# Patient Record
Sex: MALE | Race: WHITE | NOT HISPANIC OR LATINO | Employment: OTHER | URBAN - METROPOLITAN AREA
[De-identification: names, ages, dates, MRNs, and addresses within clinical notes are randomized per-mention and may not be internally consistent; named-entity substitution may affect disease eponyms.]

---

## 2017-01-12 RX ORDER — GLIPIZIDE 5 MG/1
TABLET ORAL
Qty: 180 TABLET | Refills: 0 | Status: SHIPPED | OUTPATIENT
Start: 2017-01-12 | End: 2017-03-30 | Stop reason: SDUPTHER

## 2017-01-13 ENCOUNTER — APPOINTMENT (OUTPATIENT)
Dept: CT IMAGING | Facility: HOSPITAL | Age: 77
End: 2017-01-13

## 2017-01-13 ENCOUNTER — APPOINTMENT (OUTPATIENT)
Dept: GENERAL RADIOLOGY | Facility: HOSPITAL | Age: 77
End: 2017-01-13

## 2017-01-23 RX ORDER — CYCLOBENZAPRINE HCL 5 MG
TABLET ORAL
Qty: 60 TABLET | Refills: 0 | Status: SHIPPED | OUTPATIENT
Start: 2017-01-23 | End: 2017-04-10 | Stop reason: HOSPADM

## 2017-02-22 ENCOUNTER — HOSPITAL ENCOUNTER (OUTPATIENT)
Dept: ULTRASOUND IMAGING | Facility: HOSPITAL | Age: 77
Discharge: HOME | End: 2017-02-22
Attending: FAMILY MEDICINE | Admitting: FAMILY MEDICINE

## 2017-02-22 DIAGNOSIS — E04.1 THYROID NODULE: ICD-10-CM

## 2017-02-22 PROCEDURE — 76536 US EXAM OF HEAD AND NECK: CPT

## 2017-02-23 ENCOUNTER — HOSPITAL ENCOUNTER (OUTPATIENT)
Dept: GENERAL RADIOLOGY | Facility: HOSPITAL | Age: 77
Discharge: HOME | End: 2017-02-23
Admitting: PAIN MEDICINE

## 2017-02-23 DIAGNOSIS — M51.37 DDD (DEGENERATIVE DISC DISEASE), LUMBOSACRAL: ICD-10-CM

## 2017-02-23 PROCEDURE — 73502 X-RAY EXAM HIP UNI 2-3 VIEWS: CPT

## 2017-03-08 RX ORDER — LEVOTHYROXINE SODIUM 0.05 MG/1
TABLET ORAL
Qty: 90 TABLET | Refills: 1 | Status: SHIPPED | OUTPATIENT
Start: 2017-03-08 | End: 2017-03-30

## 2017-03-30 ENCOUNTER — OFFICE VISIT (OUTPATIENT)
Dept: FAMILY MEDICINE CLINIC | Facility: CLINIC | Age: 77
End: 2017-03-30

## 2017-03-30 VITALS
SYSTOLIC BLOOD PRESSURE: 134 MMHG | TEMPERATURE: 97.8 F | HEART RATE: 73 BPM | DIASTOLIC BLOOD PRESSURE: 90 MMHG | BODY MASS INDEX: 27.09 KG/M2 | WEIGHT: 204.4 LBS | OXYGEN SATURATION: 97 % | HEIGHT: 73 IN

## 2017-03-30 DIAGNOSIS — E04.2 MULTIPLE THYROID NODULES: ICD-10-CM

## 2017-03-30 DIAGNOSIS — M54.16 LUMBAR RADICULOPATHY: Primary | ICD-10-CM

## 2017-03-30 PROCEDURE — 99213 OFFICE O/P EST LOW 20 MIN: CPT | Performed by: FAMILY MEDICINE

## 2017-03-30 RX ORDER — LANCETS
EACH MISCELLANEOUS
Qty: 100 EACH | Refills: 12 | Status: SHIPPED | OUTPATIENT
Start: 2017-03-30

## 2017-03-30 RX ORDER — ATORVASTATIN CALCIUM 80 MG/1
80 TABLET, FILM COATED ORAL DAILY
Qty: 90 TABLET | Refills: 1 | Status: SHIPPED | OUTPATIENT
Start: 2017-03-30 | End: 2017-12-04 | Stop reason: SDUPTHER

## 2017-03-30 RX ORDER — LEVOTHYROXINE SODIUM 0.07 MG/1
75 TABLET ORAL DAILY
Qty: 90 TABLET | Refills: 1 | Status: SHIPPED | OUTPATIENT
Start: 2017-03-30 | End: 2017-12-15 | Stop reason: DRUGHIGH

## 2017-03-30 RX ORDER — GLIPIZIDE 5 MG/1
5 TABLET ORAL
Qty: 180 TABLET | Refills: 1 | Status: SHIPPED | OUTPATIENT
Start: 2017-03-30 | End: 2017-10-31 | Stop reason: SDUPTHER

## 2017-03-30 NOTE — PROGRESS NOTES
"  Chief Complaint   Patient presents with   • Hip Pain     pt is been with left hip pain for over 6m ,he has chronic lower back pain but since his getting physical therapy ,has left hip pain and goes all the way down to left leg,he thinks pain manadgment ,with epidural injections my help       Subjective.../HPI  Patient present today with pain in left hip and leg.     I have reviewed the patient's medical history in detail and updated the computerized patient record.    Family History   Problem Relation Age of Onset   • Breast cancer Mother    • Cancer Mother    • Brain cancer Sister    • Other Brother      CABG   • Stroke Brother    • Heart disease Brother        Social History     Social History   • Marital status:      Spouse name: N/A   • Number of children: N/A   • Years of education: N/A     Occupational History   • Not on file.     Social History Main Topics   • Smoking status: Former Smoker     Packs/day: 1.00     Years: 20.00   • Smokeless tobacco: Not on file   • Alcohol use No      Comment: RARE 3-4 YR   • Drug use: Not on file   • Sexual activity: Not on file     Other Topics Concern   • Not on file     Social History Narrative       Review of Systems:   Review of Systems   Constitutional: Negative.    HENT: Positive for hearing loss.    Eyes: Negative.    Respiratory: Positive for apnea.    Cardiovascular: Negative.    Gastrointestinal: Positive for constipation.   Endocrine: Negative.    Musculoskeletal: Positive for arthralgias, back pain, gait problem and joint swelling.   Skin: Negative.    Neurological: Positive for headaches.   Hematological: Negative.    Psychiatric/Behavioral: Positive for confusion.       Objective:  Vital Signs     Vitals:    03/30/17 1213   BP: 134/90   BP Location: Right arm   Patient Position: Sitting   Pulse: 73   Temp: 97.8 °F (36.6 °C)   TempSrc: Oral   SpO2: 97%   Weight: 204 lb 6.4 oz (92.7 kg)   Height: 73\" (185.4 cm)     Physical Exam   Constitutional: He is " oriented to person, place, and time. He appears well-developed and well-nourished.   HENT:   Head: Normocephalic.   Eyes: Pupils are equal, round, and reactive to light.   Neck: Normal range of motion.   Cardiovascular: Normal rate and regular rhythm.    Pulmonary/Chest: Effort normal.   Abdominal: Soft.   Musculoskeletal: Normal range of motion.   Neurological: He is alert and oriented to person, place, and time.   Left Lumbar radiculopathy with pain radiating down left leg to hip and lateral thigh   Skin: Skin is warm and dry.        Results Review:      REVIEWED AND DISCUSSED XRAY RESULTS WITH PATIENT and REVIEWED AND DISCUSSED CLINICAL RESULTS WITH PATIENT                          Current Outpatient Prescriptions:   •  aspirin 81 MG tablet, Take 81 mg by mouth Daily., Disp: , Rfl:   •  atorvastatin (LIPITOR) 80 MG tablet, Take 1 tablet by mouth Daily., Disp: 90 tablet, Rfl: 1  •  cyclobenzaprine (FLEXERIL) 5 MG tablet, TAKE 1 TABLET EVERY EVENING, Disp: 60 tablet, Rfl: 0  •  Ferrous Sulfate 27 MG tablet, Take  by mouth Every Night., Disp: , Rfl:   •  glipiZIDE (GLUCOTROL) 5 MG tablet, Take 1 tablet by mouth 2 (Two) Times a Day Before Meals., Disp: 180 tablet, Rfl: 1  •  levothyroxine (SYNTHROID) 75 MCG tablet, Take 1 tablet by mouth Daily., Disp: 90 tablet, Rfl: 1  •  meclizine (ANTIVERT) 25 MG tablet, Take 1 tablet by mouth At Night As Needed for dizziness., Disp: 30 tablet, Rfl: 2  •  naproxen sodium (ALEVE) 220 MG tablet, Take 220 mg by mouth 2 (Two) Times a Day As Needed for mild pain (1-3)., Disp: , Rfl:   •  ONE TOUCH ULTRA TEST test strip, 1 each by Other route 2 (Two) Times a Day., Disp: 100 each, Rfl: 5  •  Lancets (ONETOUCH ULTRASOFT) lancets, Daily fasting glucose, Disp: 100 each, Rfl: 12    Procedures    Assessment/Plan     Diagnoses and all orders for this visit:    Lumbar radiculopathy    Multiple thyroid nodules  -     US Thyroid    Other orders  -     atorvastatin (LIPITOR) 80 MG tablet; Take 1  tablet by mouth Daily.  -     levothyroxine (SYNTHROID) 75 MCG tablet; Take 1 tablet by mouth Daily.  -     glipiZIDE (GLUCOTROL) 5 MG tablet; Take 1 tablet by mouth 2 (Two) Times a Day Before Meals.  -     ONE TOUCH ULTRA TEST test strip; 1 each by Other route 2 (Two) Times a Day.  -     Lancets (ONETOUCH ULTRASOFT) lancets; Daily fasting glucose         Has seen miguel miranda and the studies she ordered were not done and did not follow up with dr win. Will schedule the 3 xrays and then get appt to dr win.      Pt latter states that he canceled the tests that miguel miranda scheduled due to the fact that he was afraid when he read that he had to lay down for 24 hours and to go to er if developed a headache. He is ok with doing the tests now.  Luis Darnell Jr., MD  03/30/17  2:00 PM

## 2017-04-05 ENCOUNTER — APPOINTMENT (OUTPATIENT)
Dept: ULTRASOUND IMAGING | Facility: HOSPITAL | Age: 77
End: 2017-04-05
Attending: FAMILY MEDICINE

## 2017-04-10 ENCOUNTER — OFFICE VISIT (OUTPATIENT)
Dept: NEUROSURGERY | Facility: CLINIC | Age: 77
End: 2017-04-10

## 2017-04-10 VITALS
SYSTOLIC BLOOD PRESSURE: 112 MMHG | DIASTOLIC BLOOD PRESSURE: 62 MMHG | WEIGHT: 204 LBS | HEIGHT: 73 IN | BODY MASS INDEX: 27.04 KG/M2 | HEART RATE: 73 BPM

## 2017-04-10 DIAGNOSIS — M54.16 LUMBAR RADICULOPATHY: Primary | ICD-10-CM

## 2017-04-10 DIAGNOSIS — M51.36 DDD (DEGENERATIVE DISC DISEASE), LUMBAR: ICD-10-CM

## 2017-04-10 PROCEDURE — 99213 OFFICE O/P EST LOW 20 MIN: CPT | Performed by: PHYSICIAN ASSISTANT

## 2017-04-10 NOTE — PROGRESS NOTES
Subjective   Patient ID: Anthony Hernandez is a 77 y.o. male is here today for follow-up for back and left leg pain.  He uses heat which does help with the pain. He has not had recent imaging. Mr Hernandez is not taking any pain medications at this time.      Back Pain   This is a recurrent problem. The current episode started more than 1 year ago. The problem occurs intermittently. The problem has been gradually worsening since onset. The pain is present in the lumbar spine. The quality of the pain is described as aching (leg cramp). The pain radiates to the left thigh and left knee. The pain is at a severity of 2/10. The pain is mild. Associated symptoms include leg pain. Pertinent negatives include no bladder incontinence, bowel incontinence, fever or weakness. He has tried nothing for the symptoms.   Leg Pain    There was no injury mechanism. The pain is present in the left leg. The quality of the pain is described as shooting and stabbing. The pain is moderate. The pain has been intermittent since onset.       The following portions of the patient's history were reviewed and updated as appropriate: allergies, current medications, past family history, past medical history, past social history, past surgical history and problem list.    Review of Systems   Constitutional: Negative for fever.   Gastrointestinal: Negative for bowel incontinence.   Genitourinary: Negative for bladder incontinence and difficulty urinating.   Musculoskeletal: Positive for back pain (left leg pain ). Negative for gait problem.   Neurological: Negative for weakness.   All other systems reviewed and are negative.      Objective   Physical Exam   Constitutional: He is oriented to person, place, and time. He appears well-developed and well-nourished.   HENT:   Head: Normocephalic and atraumatic.   Right Ear: External ear normal.   Left Ear: External ear normal.   Eyes: Conjunctivae and EOM are normal. Pupils are equal, round, and reactive to light.  Right eye exhibits no discharge. Left eye exhibits no discharge.   Neck: Normal range of motion. Neck supple. No tracheal deviation present.   Cardiovascular: Normal rate and regular rhythm.    Pulmonary/Chest: Effort normal and breath sounds normal. No stridor. No respiratory distress.   Abdominal: Soft. Bowel sounds are normal.   Musculoskeletal: Normal range of motion. He exhibits no edema, tenderness or deformity.   Neurological: He is alert and oriented to person, place, and time. He has normal strength and normal reflexes. He displays no atrophy, no tremor and normal reflexes. No cranial nerve deficit or sensory deficit. He exhibits normal muscle tone. He displays a negative Romberg sign. He displays no seizure activity. Coordination and gait normal.   No long tract signs   Skin: Skin is warm and dry.   Psychiatric: He has a normal mood and affect. His behavior is normal. Judgment and thought content normal.   Nursing note and vitals reviewed.    Neurologic Exam     Mental Status   Oriented to person, place, and time.     Cranial Nerves     CN III, IV, VI   Pupils are equal, round, and reactive to light.  Extraocular motions are normal.     Motor Exam     Strength   Strength 5/5 throughout.       Assessment/Plan   Independent Review of Radiographic Studies:      Medical Decision Making:    Mr. Hernandez decided not to get the myelogram after his last appointment.  He now returns with chronic low back pain and left lateral leg pain.  The symptoms have not changed at all.  The pain is constant and severe and described as a burning type pain.  The leg pain is most limiting.  It worsens with activity and improves only very minimally when laying down.  It has not improved with therapy or epidurals or radiofrequency ablation.  He has not seen Dr. Chacko in a while but at his last appointment they had discussed the possibility of a spinal cord stimulator.  He would like to determine if there are any surgical options prior  to following up on a spinal cord stimulator trial.  He now feels ready to move forward with the myelogram.  I again discussed the procedure with the patient and his wife and he is aware of the risks of bleeding, infection and headache and does wish to proceed.  He will follow-up thereafter.  Anthony was seen today for back pain and leg pain.    Diagnoses and all orders for this visit:    Lumbar radiculopathy  -     CT lumbar spine without contrast; Future  -     IR MYELOGRAM LUMBAR SPINE; Future  -     XR Spine Lumbar Complete With Flex & Ext; Future    DDD (degenerative disc disease), lumbar  -     CT lumbar spine without contrast; Future  -     IR MYELOGRAM LUMBAR SPINE; Future  -     XR Spine Lumbar Complete With Flex & Ext; Future    Return for follow up after radiology test with Dr. Contreras.

## 2017-05-12 ENCOUNTER — HOSPITAL ENCOUNTER (OUTPATIENT)
Dept: GENERAL RADIOLOGY | Facility: HOSPITAL | Age: 77
Discharge: HOME | End: 2017-05-12

## 2017-05-12 ENCOUNTER — HOSPITAL ENCOUNTER (OUTPATIENT)
Dept: CT IMAGING | Facility: HOSPITAL | Age: 77
Discharge: HOME | End: 2017-05-12

## 2017-05-12 ENCOUNTER — HOSPITAL ENCOUNTER (OUTPATIENT)
Dept: GENERAL RADIOLOGY | Facility: HOSPITAL | Age: 77
Discharge: HOME | End: 2017-05-12
Admitting: PHYSICIAN ASSISTANT

## 2017-05-12 VITALS
WEIGHT: 199 LBS | TEMPERATURE: 97.2 F | DIASTOLIC BLOOD PRESSURE: 76 MMHG | HEART RATE: 72 BPM | HEIGHT: 73 IN | RESPIRATION RATE: 18 BRPM | BODY MASS INDEX: 26.37 KG/M2 | OXYGEN SATURATION: 95 % | SYSTOLIC BLOOD PRESSURE: 168 MMHG

## 2017-05-12 DIAGNOSIS — M51.36 DDD (DEGENERATIVE DISC DISEASE), LUMBAR: ICD-10-CM

## 2017-05-12 DIAGNOSIS — M54.16 LUMBAR RADICULOPATHY: ICD-10-CM

## 2017-05-12 PROCEDURE — 72265 MYELOGRAPHY L-S SPINE: CPT

## 2017-05-12 PROCEDURE — 72114 X-RAY EXAM L-S SPINE BENDING: CPT

## 2017-05-12 PROCEDURE — 0 IOPAMIDOL 41 % SOLUTION: Performed by: RADIOLOGY

## 2017-05-12 PROCEDURE — 72131 CT LUMBAR SPINE W/O DYE: CPT

## 2017-05-12 RX ORDER — LIDOCAINE HYDROCHLORIDE 10 MG/ML
10 INJECTION, SOLUTION INFILTRATION; PERINEURAL ONCE
Status: COMPLETED | OUTPATIENT
Start: 2017-05-12 | End: 2017-05-12

## 2017-05-12 RX ADMIN — LIDOCAINE HYDROCHLORIDE 2 ML: 10 INJECTION, SOLUTION INFILTRATION; PERINEURAL at 09:15

## 2017-05-12 RX ADMIN — IOPAMIDOL 20 ML: 408 INJECTION, SOLUTION INTRATHECAL at 09:17

## 2017-05-16 DIAGNOSIS — Z86.39 HISTORY OF THYROID NODULE: Primary | ICD-10-CM

## 2017-05-22 RX ORDER — CYCLOBENZAPRINE HCL 5 MG
TABLET ORAL
Qty: 60 TABLET | Refills: 0 | Status: SHIPPED | OUTPATIENT
Start: 2017-05-22 | End: 2017-06-19 | Stop reason: ALTCHOICE

## 2017-05-30 ENCOUNTER — OFFICE VISIT (OUTPATIENT)
Dept: NEUROSURGERY | Facility: CLINIC | Age: 77
End: 2017-05-30

## 2017-05-30 VITALS
BODY MASS INDEX: 26.11 KG/M2 | DIASTOLIC BLOOD PRESSURE: 80 MMHG | WEIGHT: 197 LBS | SYSTOLIC BLOOD PRESSURE: 152 MMHG | HEART RATE: 69 BPM | HEIGHT: 73 IN

## 2017-05-30 DIAGNOSIS — M48.061 LUMBAR CANAL STENOSIS: Primary | ICD-10-CM

## 2017-05-30 DIAGNOSIS — M51.36 DDD (DEGENERATIVE DISC DISEASE), LUMBAR: ICD-10-CM

## 2017-05-30 PROCEDURE — 99214 OFFICE O/P EST MOD 30 MIN: CPT | Performed by: NEUROLOGICAL SURGERY

## 2017-06-05 ENCOUNTER — TRANSCRIBE ORDERS (OUTPATIENT)
Dept: NEUROSURGERY | Facility: CLINIC | Age: 77
End: 2017-06-05

## 2017-06-05 DIAGNOSIS — M48.061 LUMBAR SPINAL STENOSIS: Primary | ICD-10-CM

## 2017-06-05 DIAGNOSIS — M51.36 LUMBAR DEGENERATIVE DISC DISEASE: ICD-10-CM

## 2017-06-07 ENCOUNTER — HOSPITAL ENCOUNTER (OUTPATIENT)
Dept: ULTRASOUND IMAGING | Facility: HOSPITAL | Age: 77
Discharge: HOME | End: 2017-06-07
Attending: FAMILY MEDICINE | Admitting: FAMILY MEDICINE

## 2017-06-07 DIAGNOSIS — Z86.39 HISTORY OF THYROID NODULE: ICD-10-CM

## 2017-06-07 PROCEDURE — 76536 US EXAM OF HEAD AND NECK: CPT

## 2017-06-09 DIAGNOSIS — E04.1 THYROID NODULE: Primary | ICD-10-CM

## 2017-06-12 ENCOUNTER — TELEPHONE (OUTPATIENT)
Dept: NEUROSURGERY | Facility: CLINIC | Age: 77
End: 2017-06-12

## 2017-06-12 NOTE — TELEPHONE ENCOUNTER
He is scheduled for surgery 6/27 with Dr. Contreras. Is it ok for him to try some PT until he has surgery?

## 2017-06-13 NOTE — TELEPHONE ENCOUNTER
Spoke with Mrs. Hernandez she said he will not take the chances of causing more pain so he will not do PT.

## 2017-06-13 NOTE — TELEPHONE ENCOUNTER
He has fairly severe stenosis. I don't think it will help and it may exacerbate his pain but if he insists on doing it I am ok with giving him an order. Please just document that we warned him that it may make his pain worse.

## 2017-06-19 ENCOUNTER — HOSPITAL ENCOUNTER (OUTPATIENT)
Dept: GENERAL RADIOLOGY | Facility: HOSPITAL | Age: 77
Discharge: HOME | End: 2017-06-19
Admitting: NEUROLOGICAL SURGERY

## 2017-06-19 ENCOUNTER — APPOINTMENT (OUTPATIENT)
Dept: PREADMISSION TESTING | Facility: HOSPITAL | Age: 77
End: 2017-06-19

## 2017-06-19 VITALS
DIASTOLIC BLOOD PRESSURE: 81 MMHG | BODY MASS INDEX: 25.98 KG/M2 | SYSTOLIC BLOOD PRESSURE: 143 MMHG | HEIGHT: 73 IN | WEIGHT: 196 LBS | HEART RATE: 78 BPM | TEMPERATURE: 97.3 F | RESPIRATION RATE: 16 BRPM | OXYGEN SATURATION: 100 %

## 2017-06-19 LAB
ANION GAP SERPL CALCULATED.3IONS-SCNC: 14.1 MMOL/L
APTT PPP: 26.5 SECONDS (ref 22.7–35.4)
BACTERIA UR QL AUTO: NORMAL /HPF
BASOPHILS # BLD AUTO: 0.08 10*3/MM3 (ref 0–0.2)
BASOPHILS NFR BLD AUTO: 1 % (ref 0–1.5)
BILIRUB UR QL STRIP: NEGATIVE
BUN BLD-MCNC: 17 MG/DL (ref 8–23)
BUN/CREAT SERPL: 12.6 (ref 7–25)
CALCIUM SPEC-SCNC: 9.1 MG/DL (ref 8.6–10.5)
CHLORIDE SERPL-SCNC: 100 MMOL/L (ref 98–107)
CLARITY UR: CLEAR
CO2 SERPL-SCNC: 24.9 MMOL/L (ref 22–29)
COLOR UR: YELLOW
CREAT BLD-MCNC: 1.35 MG/DL (ref 0.76–1.27)
DEPRECATED RDW RBC AUTO: 41.5 FL (ref 37–54)
EOSINOPHIL # BLD AUTO: 0.08 10*3/MM3 (ref 0–0.7)
EOSINOPHIL NFR BLD AUTO: 1 % (ref 0.3–6.2)
ERYTHROCYTE [DISTWIDTH] IN BLOOD BY AUTOMATED COUNT: 12.9 % (ref 11.5–14.5)
GFR SERPL CREATININE-BSD FRML MDRD: 51 ML/MIN/1.73
GLUCOSE BLD-MCNC: 179 MG/DL (ref 65–99)
GLUCOSE UR STRIP-MCNC: ABNORMAL MG/DL
HCT VFR BLD AUTO: 38.6 % (ref 40.4–52.2)
HGB BLD-MCNC: 13.2 G/DL (ref 13.7–17.6)
HGB UR QL STRIP.AUTO: NEGATIVE
HYALINE CASTS UR QL AUTO: NORMAL /LPF
IMM GRANULOCYTES # BLD: 0.02 10*3/MM3 (ref 0–0.03)
IMM GRANULOCYTES NFR BLD: 0.2 % (ref 0–0.5)
INR PPP: 1.14 (ref 0.9–1.1)
KETONES UR QL STRIP: NEGATIVE
LEUKOCYTE ESTERASE UR QL STRIP.AUTO: NEGATIVE
LYMPHOCYTES # BLD AUTO: 2.97 10*3/MM3 (ref 0.9–4.8)
LYMPHOCYTES NFR BLD AUTO: 36.4 % (ref 19.6–45.3)
MCH RBC QN AUTO: 30.2 PG (ref 27–32.7)
MCHC RBC AUTO-ENTMCNC: 34.2 G/DL (ref 32.6–36.4)
MCV RBC AUTO: 88.3 FL (ref 79.8–96.2)
MONOCYTES # BLD AUTO: 0.46 10*3/MM3 (ref 0.2–1.2)
MONOCYTES NFR BLD AUTO: 5.6 % (ref 5–12)
NEUTROPHILS # BLD AUTO: 4.54 10*3/MM3 (ref 1.9–8.1)
NEUTROPHILS NFR BLD AUTO: 55.8 % (ref 42.7–76)
NITRITE UR QL STRIP: NEGATIVE
PH UR STRIP.AUTO: 5.5 [PH] (ref 5–8)
PLATELET # BLD AUTO: 193 10*3/MM3 (ref 140–500)
PMV BLD AUTO: 9.8 FL (ref 6–12)
POTASSIUM BLD-SCNC: 4.2 MMOL/L (ref 3.5–5.2)
PROT UR QL STRIP: NEGATIVE
PROTHROMBIN TIME: 14.1 SECONDS (ref 11.7–14.2)
RBC # BLD AUTO: 4.37 10*6/MM3 (ref 4.6–6)
RBC # UR: NORMAL /HPF
REF LAB TEST METHOD: NORMAL
SODIUM BLD-SCNC: 139 MMOL/L (ref 136–145)
SP GR UR STRIP: <=1.005 (ref 1–1.03)
SQUAMOUS #/AREA URNS HPF: NORMAL /HPF
UROBILINOGEN UR QL STRIP: ABNORMAL
WBC NRBC COR # BLD: 8.15 10*3/MM3 (ref 4.5–10.7)
WBC UR QL AUTO: NORMAL /HPF

## 2017-06-19 PROCEDURE — 81001 URINALYSIS AUTO W/SCOPE: CPT | Performed by: NEUROLOGICAL SURGERY

## 2017-06-19 PROCEDURE — 85025 COMPLETE CBC W/AUTO DIFF WBC: CPT | Performed by: NEUROLOGICAL SURGERY

## 2017-06-19 PROCEDURE — 93005 ELECTROCARDIOGRAM TRACING: CPT

## 2017-06-19 PROCEDURE — 80048 BASIC METABOLIC PNL TOTAL CA: CPT | Performed by: NEUROLOGICAL SURGERY

## 2017-06-19 PROCEDURE — 71020 HC CHEST PA AND LATERAL: CPT

## 2017-06-19 PROCEDURE — 85730 THROMBOPLASTIN TIME PARTIAL: CPT | Performed by: NEUROLOGICAL SURGERY

## 2017-06-19 PROCEDURE — 36415 COLL VENOUS BLD VENIPUNCTURE: CPT

## 2017-06-19 PROCEDURE — 93010 ELECTROCARDIOGRAM REPORT: CPT | Performed by: INTERNAL MEDICINE

## 2017-06-19 PROCEDURE — 85610 PROTHROMBIN TIME: CPT | Performed by: NEUROLOGICAL SURGERY

## 2017-06-19 RX ORDER — CYCLOBENZAPRINE HCL 5 MG
5 TABLET ORAL 2 TIMES DAILY PRN
COMMUNITY
End: 2017-07-06 | Stop reason: SDUPTHER

## 2017-06-19 NOTE — DISCHARGE INSTRUCTIONS
NO medications the morning of surgery :        General Instructions:  • Do not eat solid food after midnight the night before surgery.  • You may drink clear liquids day of surgery but must stop at least one hour before your hospital arrival time.  • It is beneficial for you to have a clear drink that contains carbohydrates the day of surgery.  We suggest a 20 ounce bottle of Gatorade or Powerade for non-diabetic patients or a 20 ounce bottle of G2 or Powerade Zero for diabetic patients. (Pediatric patients, are not advised to drink a 20 ounce carbohydrate drink)    Clear liquids are liquids you can see through.  Nothing red in color.     Plain water                               Sports drinks  Sodas                                   Gelatin (Jell-O)  Fruit juices without pulp such as white grape juice and apple juice  Popsicles that contain no fruit or yogurt  Tea or coffee (no cream or milk added)  Gatorade / Powerade  G2 / Powerade Zero  • Patients who avoid smoking, chewing tobacco and alcohol for 4 weeks prior to surgery have a reduced risk of post-operative complications.  Quit smoking as many days before surgery as you can.  • Do not smoke, use chewing tobacco or drink alcohol the day of surgery.   • If applicable bring your C-PAP/ BI-PAP machine.  • Bring any papers given to you in the doctor’s office.  • Wear clean comfortable clothes and socks.  • Do not wear contact lenses or make-up.  Bring a case for your glasses.   • Bring crutches or walker if applicable.  • Leave all other valuables and jewelry at home.  • The Pre-Admission Testing nurse will instruct you to bring medications if unable to obtain an accurate list in Pre-Admission Testing.        Preventing a Surgical Site Infection:  • For 2 to 3 days before surgery, avoid shaving with a razor because the razor can irritate skin and make it easier to develop an infection.  • The night prior to surgery sleep in a clean bed with clean clothing.  Do not  allow pets to sleep with you.  • Shower on the morning of surgery using a fresh bar of anti-bacterial soap (such as Dial) and clean washcloth.  Dry with a clean towel and dress in clean clothing.  • Ask your surgeon if you will be receiving antibiotics prior to surgery.  • Make sure you, your family, and all healthcare providers clean their hands with soap and water or an alcohol based hand  before caring for you or your wound.    Day of surgery: 06- YOU WILL BE CALLED WITH AN ARRIVAL TIME FOR SURGERY. REPORT TO THE MAIN @ THAT TIME.  Upon arrival, a Pre-op nurse and Anesthesiologist will review your health history, obtain vital signs, and answer questions you may have.  The only belongings needed at this time will be your home medications and if applicable your C-PAP/BI-PAP machine.  If you are staying overnight your family can leave the rest of your belongings in the car and bring them to your room later.  A Pre-op nurse will start an IV and you may receive medication in preparation for surgery, including something to help you relax.  Your family will be able to see you in the Pre-op area.  While you are in surgery your family should notify the waiting room  if they leave the waiting room area and provide a contact phone number.    Please be aware that surgery does come with discomfort.  We want to make every effort to control your discomfort so please discuss any uncontrolled symptoms with your nurse.   Your doctor will most likely have prescribed pain medications.      If you are going home after surgery you will receive individualized written care instructions before being discharged.  A responsible adult must drive you to and from the hospital on the day of your surgery and stay with you for 24 hours.    If you are staying overnight following surgery, you will be transported to your hospital room following the recovery period.   has all private rooms.    If  you have any questions please call Pre-Admission Testing at [phone].  Deductibles and co-payments are collected on the day of service. Please be prepared to pay the required co-pay, deductible or deposit on the day of service as defined by your plan.

## 2017-06-27 ENCOUNTER — ANESTHESIA EVENT (OUTPATIENT)
Dept: PERIOP | Facility: HOSPITAL | Age: 77
End: 2017-06-27

## 2017-06-27 ENCOUNTER — ANESTHESIA (OUTPATIENT)
Dept: PERIOP | Facility: HOSPITAL | Age: 77
End: 2017-06-27

## 2017-06-27 ENCOUNTER — APPOINTMENT (OUTPATIENT)
Dept: GENERAL RADIOLOGY | Facility: HOSPITAL | Age: 77
End: 2017-06-27

## 2017-06-27 ENCOUNTER — HOSPITAL ENCOUNTER (INPATIENT)
Facility: HOSPITAL | Age: 77
LOS: 3 days | Discharge: NURSING, REHAB OR LONG-TERM CARE | End: 2017-07-01
Attending: NEUROLOGICAL SURGERY | Admitting: NEUROLOGICAL SURGERY

## 2017-06-27 DIAGNOSIS — R26.2 DIFFICULTY WALKING: Primary | ICD-10-CM

## 2017-06-27 PROBLEM — M48.061 STENOSIS, SPINAL, LUMBAR: Status: ACTIVE | Noted: 2017-06-27

## 2017-06-27 LAB
GLUCOSE BLDC GLUCOMTR-MCNC: 169 MG/DL (ref 70–130)
GLUCOSE BLDC GLUCOMTR-MCNC: 178 MG/DL (ref 70–130)

## 2017-06-27 PROCEDURE — 25010000002 MIDAZOLAM PER 1 MG: Performed by: ANESTHESIOLOGY

## 2017-06-27 PROCEDURE — 25010000002 PROPOFOL 10 MG/ML EMULSION: Performed by: NURSE ANESTHETIST, CERTIFIED REGISTERED

## 2017-06-27 PROCEDURE — 25010000003 CEFAZOLIN IN DEXTROSE 2-4 GM/100ML-% SOLUTION: Performed by: NEUROLOGICAL SURGERY

## 2017-06-27 PROCEDURE — A9270 NON-COVERED ITEM OR SERVICE: HCPCS | Performed by: NEUROLOGICAL SURGERY

## 2017-06-27 PROCEDURE — 25010000002 MIDAZOLAM PER 1 MG

## 2017-06-27 PROCEDURE — 63710000001 MASTISOL LIQUID: Performed by: NEUROLOGICAL SURGERY

## 2017-06-27 PROCEDURE — 63048 LAM FACETEC &FORAMOT EA ADDL: CPT | Performed by: NEUROLOGICAL SURGERY

## 2017-06-27 PROCEDURE — 82962 GLUCOSE BLOOD TEST: CPT

## 2017-06-27 PROCEDURE — 25010000002 MORPHINE PER 10 MG

## 2017-06-27 PROCEDURE — 63710000001 GLIPIZIDE 5 MG TABLET: Performed by: NEUROLOGICAL SURGERY

## 2017-06-27 PROCEDURE — 25010000002 PHENYLEPHRINE PER 1 ML: Performed by: NURSE ANESTHETIST, CERTIFIED REGISTERED

## 2017-06-27 PROCEDURE — 25010000002 FENTANYL CITRATE (PF) 100 MCG/2ML SOLUTION: Performed by: NURSE ANESTHETIST, CERTIFIED REGISTERED

## 2017-06-27 PROCEDURE — 63710000001 ATORVASTATIN 80 MG TABLET: Performed by: NEUROLOGICAL SURGERY

## 2017-06-27 PROCEDURE — 25010000002 DEXAMETHASONE PER 1 MG: Performed by: NURSE ANESTHETIST, CERTIFIED REGISTERED

## 2017-06-27 PROCEDURE — 76000 FLUOROSCOPY <1 HR PHYS/QHP: CPT

## 2017-06-27 PROCEDURE — 25010000002 ONDANSETRON PER 1 MG: Performed by: NURSE ANESTHETIST, CERTIFIED REGISTERED

## 2017-06-27 PROCEDURE — 01NB0ZZ RELEASE LUMBAR NERVE, OPEN APPROACH: ICD-10-PCS | Performed by: NEUROLOGICAL SURGERY

## 2017-06-27 PROCEDURE — 0SB20ZZ EXCISION OF LUMBAR VERTEBRAL DISC, OPEN APPROACH: ICD-10-PCS | Performed by: NEUROLOGICAL SURGERY

## 2017-06-27 PROCEDURE — 25010000002 NEOSTIGMINE PER 0.5 MG: Performed by: NURSE ANESTHETIST, CERTIFIED REGISTERED

## 2017-06-27 PROCEDURE — 63710000001 HYDROCODONE-ACETAMINOPHEN 7.5-325 MG TABLET: Performed by: NEUROLOGICAL SURGERY

## 2017-06-27 PROCEDURE — 25010000002 METHOCARBAMOL 1000 MG/10ML SOLUTION 10 ML VIAL: Performed by: NEUROLOGICAL SURGERY

## 2017-06-27 PROCEDURE — 25010000002 FENTANYL CITRATE (PF) 100 MCG/2ML SOLUTION

## 2017-06-27 PROCEDURE — 63047 LAM FACETEC & FORAMOT LUMBAR: CPT | Performed by: NEUROLOGICAL SURGERY

## 2017-06-27 PROCEDURE — 72020 X-RAY EXAM OF SPINE 1 VIEW: CPT

## 2017-06-27 PROCEDURE — 25010000003 CEFAZOLIN PER 500 MG: Performed by: NEUROLOGICAL SURGERY

## 2017-06-27 RX ORDER — GLIPIZIDE 5 MG/1
5 TABLET ORAL
Status: DISCONTINUED | OUTPATIENT
Start: 2017-06-27 | End: 2017-07-01 | Stop reason: HOSPADM

## 2017-06-27 RX ORDER — MORPHINE SULFATE IN 0.9 % NACL 50 MG/50ML
PATIENT CONTROLLED ANALGESIA SYRINGE INTRAVENOUS
Status: COMPLETED
Start: 2017-06-27 | End: 2017-06-27

## 2017-06-27 RX ORDER — HYDRALAZINE HYDROCHLORIDE 20 MG/ML
5 INJECTION INTRAMUSCULAR; INTRAVENOUS
Status: DISCONTINUED | OUTPATIENT
Start: 2017-06-27 | End: 2017-06-27 | Stop reason: HOSPADM

## 2017-06-27 RX ORDER — MIDAZOLAM HYDROCHLORIDE 1 MG/ML
INJECTION INTRAMUSCULAR; INTRAVENOUS
Status: COMPLETED
Start: 2017-06-27 | End: 2017-06-27

## 2017-06-27 RX ORDER — FENTANYL CITRATE 50 UG/ML
INJECTION, SOLUTION INTRAMUSCULAR; INTRAVENOUS
Status: COMPLETED
Start: 2017-06-27 | End: 2017-06-27

## 2017-06-27 RX ORDER — DEXTROSE, SODIUM CHLORIDE, AND POTASSIUM CHLORIDE 5; .45; .15 G/100ML; G/100ML; G/100ML
75 INJECTION INTRAVENOUS CONTINUOUS
Status: DISCONTINUED | OUTPATIENT
Start: 2017-06-27 | End: 2017-07-01 | Stop reason: HOSPADM

## 2017-06-27 RX ORDER — FENTANYL CITRATE 50 UG/ML
50 INJECTION, SOLUTION INTRAMUSCULAR; INTRAVENOUS
Status: DISCONTINUED | OUTPATIENT
Start: 2017-06-27 | End: 2017-06-27 | Stop reason: HOSPADM

## 2017-06-27 RX ORDER — SODIUM CHLORIDE 0.9 % (FLUSH) 0.9 %
1-10 SYRINGE (ML) INJECTION AS NEEDED
Status: DISCONTINUED | OUTPATIENT
Start: 2017-06-27 | End: 2017-06-27 | Stop reason: HOSPADM

## 2017-06-27 RX ORDER — SODIUM CHLORIDE, SODIUM LACTATE, POTASSIUM CHLORIDE, CALCIUM CHLORIDE 600; 310; 30; 20 MG/100ML; MG/100ML; MG/100ML; MG/100ML
9 INJECTION, SOLUTION INTRAVENOUS CONTINUOUS
Status: DISCONTINUED | OUTPATIENT
Start: 2017-06-27 | End: 2017-06-27

## 2017-06-27 RX ORDER — SODIUM CHLORIDE, SODIUM LACTATE, POTASSIUM CHLORIDE, CALCIUM CHLORIDE 600; 310; 30; 20 MG/100ML; MG/100ML; MG/100ML; MG/100ML
INJECTION, SOLUTION INTRAVENOUS CONTINUOUS PRN
Status: DISCONTINUED | OUTPATIENT
Start: 2017-06-27 | End: 2017-06-27 | Stop reason: SURG

## 2017-06-27 RX ORDER — HYDROMORPHONE HYDROCHLORIDE 1 MG/ML
0.25 INJECTION, SOLUTION INTRAMUSCULAR; INTRAVENOUS; SUBCUTANEOUS
Status: DISCONTINUED | OUTPATIENT
Start: 2017-06-27 | End: 2017-06-27 | Stop reason: HOSPADM

## 2017-06-27 RX ORDER — GLYCOPYRROLATE 0.2 MG/ML
INJECTION INTRAMUSCULAR; INTRAVENOUS AS NEEDED
Status: DISCONTINUED | OUTPATIENT
Start: 2017-06-27 | End: 2017-06-27 | Stop reason: SURG

## 2017-06-27 RX ORDER — ATORVASTATIN CALCIUM 80 MG/1
80 TABLET, FILM COATED ORAL DAILY
Status: DISCONTINUED | OUTPATIENT
Start: 2017-06-27 | End: 2017-07-01 | Stop reason: HOSPADM

## 2017-06-27 RX ORDER — DEXAMETHASONE SODIUM PHOSPHATE 10 MG/ML
INJECTION INTRAMUSCULAR; INTRAVENOUS AS NEEDED
Status: DISCONTINUED | OUTPATIENT
Start: 2017-06-27 | End: 2017-06-27 | Stop reason: SURG

## 2017-06-27 RX ORDER — BUPIVACAINE HYDROCHLORIDE AND EPINEPHRINE 2.5; 5 MG/ML; UG/ML
INJECTION, SOLUTION INFILTRATION; PERINEURAL AS NEEDED
Status: DISCONTINUED | OUTPATIENT
Start: 2017-06-27 | End: 2017-06-27 | Stop reason: HOSPADM

## 2017-06-27 RX ORDER — ONDANSETRON 2 MG/ML
4 INJECTION INTRAMUSCULAR; INTRAVENOUS EVERY 6 HOURS PRN
Status: DISCONTINUED | OUTPATIENT
Start: 2017-06-27 | End: 2017-07-01 | Stop reason: HOSPADM

## 2017-06-27 RX ORDER — ONDANSETRON 2 MG/ML
4 INJECTION INTRAMUSCULAR; INTRAVENOUS ONCE AS NEEDED
Status: DISCONTINUED | OUTPATIENT
Start: 2017-06-27 | End: 2017-06-27 | Stop reason: HOSPADM

## 2017-06-27 RX ORDER — ENALAPRILAT 2.5 MG/2ML
1.25 INJECTION INTRAVENOUS ONCE AS NEEDED
Status: DISCONTINUED | OUTPATIENT
Start: 2017-06-27 | End: 2017-06-27 | Stop reason: HOSPADM

## 2017-06-27 RX ORDER — CYCLOBENZAPRINE HCL 10 MG
5 TABLET ORAL 3 TIMES DAILY PRN
Status: DISCONTINUED | OUTPATIENT
Start: 2017-06-27 | End: 2017-07-01 | Stop reason: HOSPADM

## 2017-06-27 RX ORDER — ROCURONIUM BROMIDE 10 MG/ML
INJECTION, SOLUTION INTRAVENOUS AS NEEDED
Status: DISCONTINUED | OUTPATIENT
Start: 2017-06-27 | End: 2017-06-27 | Stop reason: SURG

## 2017-06-27 RX ORDER — FAMOTIDINE 10 MG/ML
20 INJECTION, SOLUTION INTRAVENOUS ONCE
Status: COMPLETED | OUTPATIENT
Start: 2017-06-27 | End: 2017-06-27

## 2017-06-27 RX ORDER — SODIUM CHLORIDE 0.9 % (FLUSH) 0.9 %
1-10 SYRINGE (ML) INJECTION AS NEEDED
Status: DISCONTINUED | OUTPATIENT
Start: 2017-06-27 | End: 2017-07-01 | Stop reason: HOSPADM

## 2017-06-27 RX ORDER — NALOXONE HCL 0.4 MG/ML
0.1 VIAL (ML) INJECTION
Status: DISCONTINUED | OUTPATIENT
Start: 2017-06-27 | End: 2017-06-27 | Stop reason: HOSPADM

## 2017-06-27 RX ORDER — LIDOCAINE HYDROCHLORIDE 40 MG/ML
SOLUTION TOPICAL AS NEEDED
Status: DISCONTINUED | OUTPATIENT
Start: 2017-06-27 | End: 2017-06-27 | Stop reason: SURG

## 2017-06-27 RX ORDER — LABETALOL HYDROCHLORIDE 5 MG/ML
5 INJECTION, SOLUTION INTRAVENOUS
Status: DISCONTINUED | OUTPATIENT
Start: 2017-06-27 | End: 2017-06-27 | Stop reason: HOSPADM

## 2017-06-27 RX ORDER — HEPARIN SODIUM 10000 [USP'U]/ML
INJECTION, SOLUTION INTRAVENOUS; SUBCUTANEOUS AS NEEDED
Status: DISCONTINUED | OUTPATIENT
Start: 2017-06-27 | End: 2017-06-27 | Stop reason: HOSPADM

## 2017-06-27 RX ORDER — SODIUM CHLORIDE 9 MG/ML
INJECTION, SOLUTION INTRAVENOUS CONTINUOUS PRN
Status: DISCONTINUED | OUTPATIENT
Start: 2017-06-27 | End: 2017-06-27 | Stop reason: HOSPADM

## 2017-06-27 RX ORDER — SENNA AND DOCUSATE SODIUM 50; 8.6 MG/1; MG/1
1 TABLET, FILM COATED ORAL NIGHTLY PRN
Status: DISCONTINUED | OUTPATIENT
Start: 2017-06-27 | End: 2017-07-01 | Stop reason: HOSPADM

## 2017-06-27 RX ORDER — CEFAZOLIN SODIUM 2 G/100ML
2 INJECTION, SOLUTION INTRAVENOUS ONCE
Status: COMPLETED | OUTPATIENT
Start: 2017-06-27 | End: 2017-06-27

## 2017-06-27 RX ORDER — ACETAMINOPHEN 325 MG/1
650 TABLET ORAL EVERY 4 HOURS PRN
Status: DISCONTINUED | OUTPATIENT
Start: 2017-06-27 | End: 2017-07-01 | Stop reason: HOSPADM

## 2017-06-27 RX ORDER — MORPHINE SULFATE IN 0.9 % NACL 50 MG/50ML
PATIENT CONTROLLED ANALGESIA SYRINGE INTRAVENOUS CONTINUOUS
Status: DISCONTINUED | OUTPATIENT
Start: 2017-06-27 | End: 2017-06-28

## 2017-06-27 RX ORDER — DOCUSATE SODIUM 100 MG/1
100 CAPSULE, LIQUID FILLED ORAL 2 TIMES DAILY PRN
Status: DISCONTINUED | OUTPATIENT
Start: 2017-06-27 | End: 2017-07-01 | Stop reason: HOSPADM

## 2017-06-27 RX ORDER — FENTANYL CITRATE 50 UG/ML
INJECTION, SOLUTION INTRAMUSCULAR; INTRAVENOUS AS NEEDED
Status: DISCONTINUED | OUTPATIENT
Start: 2017-06-27 | End: 2017-06-27 | Stop reason: SURG

## 2017-06-27 RX ORDER — WOUND DRESSING ADHESIVE - LIQUID
LIQUID MISCELLANEOUS AS NEEDED
Status: DISCONTINUED | OUTPATIENT
Start: 2017-06-27 | End: 2017-06-27 | Stop reason: HOSPADM

## 2017-06-27 RX ORDER — LIDOCAINE HYDROCHLORIDE 20 MG/ML
INJECTION, SOLUTION INFILTRATION; PERINEURAL AS NEEDED
Status: DISCONTINUED | OUTPATIENT
Start: 2017-06-27 | End: 2017-06-27 | Stop reason: SURG

## 2017-06-27 RX ORDER — ONDANSETRON 2 MG/ML
INJECTION INTRAMUSCULAR; INTRAVENOUS AS NEEDED
Status: DISCONTINUED | OUTPATIENT
Start: 2017-06-27 | End: 2017-06-27 | Stop reason: SURG

## 2017-06-27 RX ORDER — MIDAZOLAM HYDROCHLORIDE 1 MG/ML
1 INJECTION INTRAMUSCULAR; INTRAVENOUS
Status: DISCONTINUED | OUTPATIENT
Start: 2017-06-27 | End: 2017-06-27 | Stop reason: HOSPADM

## 2017-06-27 RX ORDER — HYDROCODONE BITARTRATE AND ACETAMINOPHEN 7.5; 325 MG/1; MG/1
1 TABLET ORAL EVERY 4 HOURS PRN
Status: DISCONTINUED | OUTPATIENT
Start: 2017-06-27 | End: 2017-07-01 | Stop reason: HOSPADM

## 2017-06-27 RX ORDER — FAMOTIDINE 10 MG/ML
INJECTION, SOLUTION INTRAVENOUS
Status: COMPLETED
Start: 2017-06-27 | End: 2017-06-27

## 2017-06-27 RX ORDER — MIDAZOLAM HYDROCHLORIDE 1 MG/ML
2 INJECTION INTRAMUSCULAR; INTRAVENOUS
Status: DISCONTINUED | OUTPATIENT
Start: 2017-06-27 | End: 2017-06-27 | Stop reason: HOSPADM

## 2017-06-27 RX ORDER — LEVOTHYROXINE SODIUM 0.07 MG/1
75 TABLET ORAL
Status: DISCONTINUED | OUTPATIENT
Start: 2017-06-28 | End: 2017-07-01 | Stop reason: HOSPADM

## 2017-06-27 RX ORDER — PROPOFOL 10 MG/ML
VIAL (ML) INTRAVENOUS AS NEEDED
Status: DISCONTINUED | OUTPATIENT
Start: 2017-06-27 | End: 2017-06-27 | Stop reason: SURG

## 2017-06-27 RX ADMIN — PHENYLEPHRINE HYDROCHLORIDE 100 MCG: 10 INJECTION INTRAVENOUS at 18:11

## 2017-06-27 RX ADMIN — FENTANYL CITRATE 50 MCG: 50 INJECTION INTRAMUSCULAR; INTRAVENOUS at 16:58

## 2017-06-27 RX ADMIN — FAMOTIDINE 20 MG: 10 INJECTION, SOLUTION INTRAVENOUS at 15:09

## 2017-06-27 RX ADMIN — FENTANYL CITRATE 50 MCG: 50 INJECTION INTRAMUSCULAR; INTRAVENOUS at 19:29

## 2017-06-27 RX ADMIN — SODIUM CHLORIDE, POTASSIUM CHLORIDE, SODIUM LACTATE AND CALCIUM CHLORIDE: 600; 310; 30; 20 INJECTION, SOLUTION INTRAVENOUS at 16:28

## 2017-06-27 RX ADMIN — PROPOFOL 130 MG: 10 INJECTION, EMULSION INTRAVENOUS at 16:58

## 2017-06-27 RX ADMIN — NEOSTIGMINE METHYLSULFATE 3 MG: 1 INJECTION INTRAMUSCULAR; INTRAVENOUS; SUBCUTANEOUS at 18:21

## 2017-06-27 RX ADMIN — SODIUM CHLORIDE, POTASSIUM CHLORIDE, SODIUM LACTATE AND CALCIUM CHLORIDE 9 ML/HR: 600; 310; 30; 20 INJECTION, SOLUTION INTRAVENOUS at 15:10

## 2017-06-27 RX ADMIN — Medication: at 19:04

## 2017-06-27 RX ADMIN — GLIPIZIDE 5 MG: 5 TABLET ORAL at 23:52

## 2017-06-27 RX ADMIN — FENTANYL CITRATE 50 MCG: 50 INJECTION INTRAMUSCULAR; INTRAVENOUS at 19:40

## 2017-06-27 RX ADMIN — MIDAZOLAM 1 MG: 1 INJECTION INTRAMUSCULAR; INTRAVENOUS at 15:53

## 2017-06-27 RX ADMIN — FAMOTIDINE 20 MG: 10 INJECTION INTRAVENOUS at 15:09

## 2017-06-27 RX ADMIN — CEFAZOLIN SODIUM 2 G: 2 INJECTION, SOLUTION INTRAVENOUS at 16:56

## 2017-06-27 RX ADMIN — LIDOCAINE HYDROCHLORIDE 100 MG: 20 INJECTION, SOLUTION INFILTRATION; PERINEURAL at 16:58

## 2017-06-27 RX ADMIN — GLYCOPYRROLATE 0.4 MG: 0.2 INJECTION INTRAMUSCULAR; INTRAVENOUS at 18:21

## 2017-06-27 RX ADMIN — POTASSIUM CHLORIDE, DEXTROSE MONOHYDRATE AND SODIUM CHLORIDE 75 ML/HR: 150; 5; 450 INJECTION, SOLUTION INTRAVENOUS at 22:19

## 2017-06-27 RX ADMIN — HYDROCODONE BITARTRATE AND ACETAMINOPHEN 1 TABLET: 7.5; 325 TABLET ORAL at 22:19

## 2017-06-27 RX ADMIN — PHENYLEPHRINE HYDROCHLORIDE 100 MCG: 10 INJECTION INTRAVENOUS at 17:40

## 2017-06-27 RX ADMIN — PHENYLEPHRINE HYDROCHLORIDE 100 MCG: 10 INJECTION INTRAVENOUS at 17:52

## 2017-06-27 RX ADMIN — ROCURONIUM BROMIDE 40 MG: 10 INJECTION INTRAVENOUS at 16:58

## 2017-06-27 RX ADMIN — PHENYLEPHRINE HYDROCHLORIDE 100 MCG: 10 INJECTION INTRAVENOUS at 17:28

## 2017-06-27 RX ADMIN — METHOCARBAMOL 1000 MG: 1000 INJECTION, SOLUTION INTRAMUSCULAR; INTRAVENOUS at 19:42

## 2017-06-27 RX ADMIN — MIDAZOLAM 1 MG: 1 INJECTION INTRAMUSCULAR; INTRAVENOUS at 15:09

## 2017-06-27 RX ADMIN — DEXAMETHASONE SODIUM PHOSPHATE 8 MG: 10 INJECTION INTRAMUSCULAR; INTRAVENOUS at 17:26

## 2017-06-27 RX ADMIN — LIDOCAINE HYDROCHLORIDE 1 EACH: 40 SPRAY LARYNGEAL; TRANSTRACHEAL at 17:01

## 2017-06-27 RX ADMIN — ONDANSETRON 4 MG: 2 INJECTION INTRAMUSCULAR; INTRAVENOUS at 18:18

## 2017-06-27 RX ADMIN — FENTANYL CITRATE 50 MCG: 50 INJECTION INTRAMUSCULAR; INTRAVENOUS at 17:22

## 2017-06-27 RX ADMIN — SODIUM CHLORIDE, POTASSIUM CHLORIDE, SODIUM LACTATE AND CALCIUM CHLORIDE: 600; 310; 30; 20 INJECTION, SOLUTION INTRAVENOUS at 17:52

## 2017-06-27 RX ADMIN — ATORVASTATIN CALCIUM 80 MG: 80 TABLET, FILM COATED ORAL at 23:52

## 2017-06-27 RX ADMIN — FENTANYL CITRATE 50 MCG: 50 INJECTION, SOLUTION INTRAMUSCULAR; INTRAVENOUS at 19:29

## 2017-06-27 NOTE — ANESTHESIA PROCEDURE NOTES
Airway  Urgency: elective    Date/Time: 6/27/2017 5:01 PM  Airway not difficult    General Information and Staff    Patient location during procedure: OR  Anesthesiologist: MARÍA ABBOTT  CRNA: MINDI THORPE    Indications and Patient Condition  Indications for airway management: airway protection    Preoxygenated: yes  MILS not maintained throughout  Mask difficulty assessment: 1 - vent by mask    Final Airway Details  Final airway type: endotracheal airway      Successful airway: ETT and reinforced tube  Cuffed: yes   Successful intubation technique: direct laryngoscopy  Endotracheal tube insertion site: oral  Blade: [place]  Blade size: #3  ETT size: 7.0 mm  Cormack-Lehane Classification: grade I - full view of glottis  Placement verified by: chest auscultation   Measured from: lips  ETT to lips (cm): 21  Number of attempts at approach: 1    Additional Comments  Pre O2, SIAI

## 2017-06-27 NOTE — ANESTHESIA PREPROCEDURE EVALUATION
Anesthesia Evaluation     Patient summary reviewed   NPO Solid Status: > 6 hours  NPO Liquid Status: > 6 hours     Airway   Mallampati: II  TM distance: >3 FB  Dental      Pulmonary    (+) sleep apnea,   Cardiovascular     Rhythm: regular  Rate: normal    (+) valvular problems/murmurs, past MI  >12 months, CAD, CABG > 6 Months, PVD, hyperlipidemia      Neuro/Psych  GI/Hepatic/Renal/Endo    (+)  diabetes mellitus type 2,     Musculoskeletal     Abdominal    Substance History      OB/GYN          Other   (+) arthritis   history of cancer                                    Anesthesia Plan    ASA 3     general     Anesthetic plan and risks discussed with patient.

## 2017-06-28 ENCOUNTER — APPOINTMENT (OUTPATIENT)
Dept: CT IMAGING | Facility: HOSPITAL | Age: 77
End: 2017-06-28

## 2017-06-28 PROBLEM — R26.2 DIFFICULTY WALKING: Status: ACTIVE | Noted: 2017-06-28

## 2017-06-28 LAB
BASOPHILS # BLD AUTO: 0.02 10*3/MM3 (ref 0–0.2)
BASOPHILS NFR BLD AUTO: 0.2 % (ref 0–1.5)
BILIRUB UR QL STRIP: NEGATIVE
CLARITY UR: CLEAR
COLOR UR: YELLOW
DEPRECATED RDW RBC AUTO: 42.4 FL (ref 37–54)
EOSINOPHIL # BLD AUTO: 0.01 10*3/MM3 (ref 0–0.7)
EOSINOPHIL NFR BLD AUTO: 0.1 % (ref 0.3–6.2)
ERYTHROCYTE [DISTWIDTH] IN BLOOD BY AUTOMATED COUNT: 13.1 % (ref 11.5–14.5)
GLUCOSE BLDC GLUCOMTR-MCNC: 122 MG/DL (ref 70–130)
GLUCOSE BLDC GLUCOMTR-MCNC: 187 MG/DL (ref 70–130)
GLUCOSE BLDC GLUCOMTR-MCNC: 256 MG/DL (ref 70–130)
GLUCOSE BLDC GLUCOMTR-MCNC: 285 MG/DL (ref 70–130)
GLUCOSE UR STRIP-MCNC: ABNORMAL MG/DL
HCT VFR BLD AUTO: 34.8 % (ref 40.4–52.2)
HGB BLD-MCNC: 12.1 G/DL (ref 13.7–17.6)
HGB UR QL STRIP.AUTO: NEGATIVE
IMM GRANULOCYTES # BLD: 0.03 10*3/MM3 (ref 0–0.03)
IMM GRANULOCYTES NFR BLD: 0.3 % (ref 0–0.5)
KETONES UR QL STRIP: ABNORMAL
LEUKOCYTE ESTERASE UR QL STRIP.AUTO: NEGATIVE
LYMPHOCYTES # BLD AUTO: 0.62 10*3/MM3 (ref 0.9–4.8)
LYMPHOCYTES NFR BLD AUTO: 5.4 % (ref 19.6–45.3)
MCH RBC QN AUTO: 31.2 PG (ref 27–32.7)
MCHC RBC AUTO-ENTMCNC: 34.8 G/DL (ref 32.6–36.4)
MCV RBC AUTO: 89.7 FL (ref 79.8–96.2)
MONOCYTES # BLD AUTO: 0.75 10*3/MM3 (ref 0.2–1.2)
MONOCYTES NFR BLD AUTO: 6.5 % (ref 5–12)
NEUTROPHILS # BLD AUTO: 10.12 10*3/MM3 (ref 1.9–8.1)
NEUTROPHILS NFR BLD AUTO: 87.5 % (ref 42.7–76)
NITRITE UR QL STRIP: NEGATIVE
PH UR STRIP.AUTO: <=5 [PH] (ref 5–8)
PLATELET # BLD AUTO: 155 10*3/MM3 (ref 140–500)
PMV BLD AUTO: 10.5 FL (ref 6–12)
PROT UR QL STRIP: NEGATIVE
RBC # BLD AUTO: 3.88 10*6/MM3 (ref 4.6–6)
SP GR UR STRIP: >=1.03 (ref 1–1.03)
UROBILINOGEN UR QL STRIP: ABNORMAL
WBC NRBC COR # BLD: 11.55 10*3/MM3 (ref 4.5–10.7)

## 2017-06-28 PROCEDURE — 94799 UNLISTED PULMONARY SVC/PX: CPT

## 2017-06-28 PROCEDURE — 82962 GLUCOSE BLOOD TEST: CPT

## 2017-06-28 PROCEDURE — 99024 POSTOP FOLLOW-UP VISIT: CPT | Performed by: PHYSICIAN ASSISTANT

## 2017-06-28 PROCEDURE — 70450 CT HEAD/BRAIN W/O DYE: CPT

## 2017-06-28 PROCEDURE — G8978 MOBILITY CURRENT STATUS: HCPCS | Performed by: PHYSICAL THERAPIST

## 2017-06-28 PROCEDURE — 81003 URINALYSIS AUTO W/O SCOPE: CPT | Performed by: PHYSICIAN ASSISTANT

## 2017-06-28 PROCEDURE — 97110 THERAPEUTIC EXERCISES: CPT | Performed by: PHYSICAL THERAPIST

## 2017-06-28 PROCEDURE — G8979 MOBILITY GOAL STATUS: HCPCS | Performed by: PHYSICAL THERAPIST

## 2017-06-28 PROCEDURE — 25010000002 DIAZEPAM PER 5 MG: Performed by: PHYSICIAN ASSISTANT

## 2017-06-28 PROCEDURE — 85025 COMPLETE CBC W/AUTO DIFF WBC: CPT | Performed by: NEUROLOGICAL SURGERY

## 2017-06-28 PROCEDURE — 97162 PT EVAL MOD COMPLEX 30 MIN: CPT | Performed by: PHYSICAL THERAPIST

## 2017-06-28 RX ORDER — DIAZEPAM 5 MG/ML
10 INJECTION, SOLUTION INTRAMUSCULAR; INTRAVENOUS ONCE
Status: COMPLETED | OUTPATIENT
Start: 2017-06-28 | End: 2017-06-28

## 2017-06-28 RX ADMIN — GLIPIZIDE 5 MG: 5 TABLET ORAL at 17:58

## 2017-06-28 RX ADMIN — POTASSIUM CHLORIDE, DEXTROSE MONOHYDRATE AND SODIUM CHLORIDE 75 ML/HR: 150; 5; 450 INJECTION, SOLUTION INTRAVENOUS at 12:55

## 2017-06-28 RX ADMIN — DIAZEPAM 5 MG: 5 INJECTION, SOLUTION INTRAMUSCULAR; INTRAVENOUS at 04:41

## 2017-06-28 NOTE — ANESTHESIA POSTPROCEDURE EVALUATION
Patient: Anthony Hernandez    Procedure Summary     Date Anesthesia Start Anesthesia Stop Room / Location    06/27/17 [id number]  [facility] /  [facility]       Procedure Diagnosis Surgeon Provider    L3/4, L4/5 OPEN LUMBAR DECOMPRESSION POSTERIOR 1-2 LEVELS (Bilateral Spine Lumbar) Lumbar spinal stenosis; Lumbar degenerative disc disease  (Lumbar spinal stenosis [M48.06]; Lumbar degenerative disc disease [M51.36]) MD Matthew Cortes MD          Anesthesia Type: general  Last vitals  /86 (06/27/17 2030)    Temp      Pulse 81 (06/27/17 2015)   Resp 20 (06/27/17 2015)    SpO2 100 % (06/27/17 2015)      Post Anesthesia Care and Evaluation    Patient location during evaluation: PACU  Anesthetic complications: No anesthetic complications

## 2017-06-29 ENCOUNTER — APPOINTMENT (OUTPATIENT)
Dept: GENERAL RADIOLOGY | Facility: HOSPITAL | Age: 77
End: 2017-06-29
Attending: FAMILY MEDICINE

## 2017-06-29 LAB
ANION GAP SERPL CALCULATED.3IONS-SCNC: 15.1 MMOL/L
BASOPHILS # BLD AUTO: 0.05 10*3/MM3 (ref 0–0.2)
BASOPHILS NFR BLD AUTO: 0.4 % (ref 0–1.5)
BUN BLD-MCNC: 14 MG/DL (ref 8–23)
BUN/CREAT SERPL: 13.5 (ref 7–25)
CALCIUM SPEC-SCNC: 8.8 MG/DL (ref 8.6–10.5)
CHLORIDE SERPL-SCNC: 99 MMOL/L (ref 98–107)
CO2 SERPL-SCNC: 23.9 MMOL/L (ref 22–29)
CREAT BLD-MCNC: 1.04 MG/DL (ref 0.76–1.27)
DEPRECATED RDW RBC AUTO: 43.7 FL (ref 37–54)
EOSINOPHIL # BLD AUTO: 0.05 10*3/MM3 (ref 0–0.7)
EOSINOPHIL NFR BLD AUTO: 0.4 % (ref 0.3–6.2)
ERYTHROCYTE [DISTWIDTH] IN BLOOD BY AUTOMATED COUNT: 13.2 % (ref 11.5–14.5)
GFR SERPL CREATININE-BSD FRML MDRD: 69 ML/MIN/1.73
GLUCOSE BLD-MCNC: 200 MG/DL (ref 65–99)
HCT VFR BLD AUTO: 35.1 % (ref 40.4–52.2)
HGB BLD-MCNC: 11.7 G/DL (ref 13.7–17.6)
IMM GRANULOCYTES # BLD: 0.04 10*3/MM3 (ref 0–0.03)
IMM GRANULOCYTES NFR BLD: 0.3 % (ref 0–0.5)
LYMPHOCYTES # BLD AUTO: 1.97 10*3/MM3 (ref 0.9–4.8)
LYMPHOCYTES NFR BLD AUTO: 15.3 % (ref 19.6–45.3)
MCH RBC QN AUTO: 30.6 PG (ref 27–32.7)
MCHC RBC AUTO-ENTMCNC: 33.3 G/DL (ref 32.6–36.4)
MCV RBC AUTO: 91.9 FL (ref 79.8–96.2)
MONOCYTES # BLD AUTO: 1.3 10*3/MM3 (ref 0.2–1.2)
MONOCYTES NFR BLD AUTO: 10.1 % (ref 5–12)
NEUTROPHILS # BLD AUTO: 9.46 10*3/MM3 (ref 1.9–8.1)
NEUTROPHILS NFR BLD AUTO: 73.5 % (ref 42.7–76)
PLATELET # BLD AUTO: 141 10*3/MM3 (ref 140–500)
PMV BLD AUTO: 10.6 FL (ref 6–12)
POTASSIUM BLD-SCNC: 4.2 MMOL/L (ref 3.5–5.2)
RBC # BLD AUTO: 3.82 10*6/MM3 (ref 4.6–6)
SODIUM BLD-SCNC: 138 MMOL/L (ref 136–145)
WBC NRBC COR # BLD: 12.87 10*3/MM3 (ref 4.5–10.7)

## 2017-06-29 PROCEDURE — 97110 THERAPEUTIC EXERCISES: CPT

## 2017-06-29 PROCEDURE — 85025 COMPLETE CBC W/AUTO DIFF WBC: CPT | Performed by: PHYSICIAN ASSISTANT

## 2017-06-29 PROCEDURE — 99233 SBSQ HOSP IP/OBS HIGH 50: CPT | Performed by: FAMILY MEDICINE

## 2017-06-29 PROCEDURE — 99024 POSTOP FOLLOW-UP VISIT: CPT | Performed by: NURSE PRACTITIONER

## 2017-06-29 PROCEDURE — 87040 BLOOD CULTURE FOR BACTERIA: CPT | Performed by: FAMILY MEDICINE

## 2017-06-29 PROCEDURE — 71020 HC CHEST PA AND LATERAL: CPT

## 2017-06-29 PROCEDURE — 82962 GLUCOSE BLOOD TEST: CPT

## 2017-06-29 PROCEDURE — 80048 BASIC METABOLIC PNL TOTAL CA: CPT | Performed by: PHYSICIAN ASSISTANT

## 2017-06-29 PROCEDURE — 94799 UNLISTED PULMONARY SVC/PX: CPT

## 2017-06-29 RX ADMIN — ATORVASTATIN CALCIUM 80 MG: 80 TABLET, FILM COATED ORAL at 08:12

## 2017-06-29 RX ADMIN — LEVOTHYROXINE SODIUM 75 MCG: 75 TABLET ORAL at 05:32

## 2017-06-29 RX ADMIN — GLIPIZIDE 5 MG: 5 TABLET ORAL at 08:12

## 2017-06-29 RX ADMIN — GLIPIZIDE 5 MG: 5 TABLET ORAL at 17:08

## 2017-06-29 RX ADMIN — ACETAMINOPHEN 650 MG: 325 TABLET ORAL at 03:14

## 2017-06-29 RX ADMIN — POTASSIUM CHLORIDE, DEXTROSE MONOHYDRATE AND SODIUM CHLORIDE 75 ML/HR: 150; 5; 450 INJECTION, SOLUTION INTRAVENOUS at 13:31

## 2017-06-29 RX ADMIN — POTASSIUM CHLORIDE, DEXTROSE MONOHYDRATE AND SODIUM CHLORIDE 75 ML/HR: 150; 5; 450 INJECTION, SOLUTION INTRAVENOUS at 03:15

## 2017-06-30 LAB
ALBUMIN SERPL-MCNC: 3.9 G/DL (ref 3.5–5.2)
ALBUMIN/GLOB SERPL: 1.3 G/DL
ALP SERPL-CCNC: 97 U/L (ref 39–117)
ALT SERPL W P-5'-P-CCNC: 19 U/L (ref 1–41)
ANION GAP SERPL CALCULATED.3IONS-SCNC: 15.2 MMOL/L
AST SERPL-CCNC: 28 U/L (ref 1–40)
BASOPHILS # BLD AUTO: 0.06 10*3/MM3 (ref 0–0.2)
BASOPHILS NFR BLD AUTO: 0.5 % (ref 0–1.5)
BILIRUB SERPL-MCNC: 0.9 MG/DL (ref 0.1–1.2)
BILIRUB UR QL STRIP: NEGATIVE
BUN BLD-MCNC: 12 MG/DL (ref 8–23)
BUN/CREAT SERPL: 12.6 (ref 7–25)
CALCIUM SPEC-SCNC: 9.2 MG/DL (ref 8.6–10.5)
CHLORIDE SERPL-SCNC: 96 MMOL/L (ref 98–107)
CLARITY UR: CLEAR
CO2 SERPL-SCNC: 24.8 MMOL/L (ref 22–29)
COLOR UR: YELLOW
CREAT BLD-MCNC: 0.95 MG/DL (ref 0.76–1.27)
DEPRECATED RDW RBC AUTO: 43.4 FL (ref 37–54)
EOSINOPHIL # BLD AUTO: 0.09 10*3/MM3 (ref 0–0.7)
EOSINOPHIL NFR BLD AUTO: 0.8 % (ref 0.3–6.2)
ERYTHROCYTE [DISTWIDTH] IN BLOOD BY AUTOMATED COUNT: 13.1 % (ref 11.5–14.5)
GFR SERPL CREATININE-BSD FRML MDRD: 77 ML/MIN/1.73
GLOBULIN UR ELPH-MCNC: 3 GM/DL
GLUCOSE BLD-MCNC: 195 MG/DL (ref 65–99)
GLUCOSE BLDC GLUCOMTR-MCNC: 189 MG/DL (ref 70–130)
GLUCOSE UR STRIP-MCNC: ABNORMAL MG/DL
HCT VFR BLD AUTO: 37.4 % (ref 40.4–52.2)
HGB BLD-MCNC: 12.6 G/DL (ref 13.7–17.6)
HGB UR QL STRIP.AUTO: NEGATIVE
IMM GRANULOCYTES # BLD: 0.04 10*3/MM3 (ref 0–0.03)
IMM GRANULOCYTES NFR BLD: 0.3 % (ref 0–0.5)
KETONES UR QL STRIP: NEGATIVE
LEUKOCYTE ESTERASE UR QL STRIP.AUTO: NEGATIVE
LYMPHOCYTES # BLD AUTO: 2.52 10*3/MM3 (ref 0.9–4.8)
LYMPHOCYTES NFR BLD AUTO: 21.2 % (ref 19.6–45.3)
MCH RBC QN AUTO: 30.6 PG (ref 27–32.7)
MCHC RBC AUTO-ENTMCNC: 33.7 G/DL (ref 32.6–36.4)
MCV RBC AUTO: 90.8 FL (ref 79.8–96.2)
MONOCYTES # BLD AUTO: 1.15 10*3/MM3 (ref 0.2–1.2)
MONOCYTES NFR BLD AUTO: 9.7 % (ref 5–12)
NEUTROPHILS # BLD AUTO: 8 10*3/MM3 (ref 1.9–8.1)
NEUTROPHILS NFR BLD AUTO: 67.5 % (ref 42.7–76)
NITRITE UR QL STRIP: NEGATIVE
PH UR STRIP.AUTO: 6 [PH] (ref 5–8)
PLATELET # BLD AUTO: 158 10*3/MM3 (ref 140–500)
PMV BLD AUTO: 10.9 FL (ref 6–12)
POTASSIUM BLD-SCNC: 3.9 MMOL/L (ref 3.5–5.2)
PROT SERPL-MCNC: 6.9 G/DL (ref 6–8.5)
PROT UR QL STRIP: NEGATIVE
RBC # BLD AUTO: 4.12 10*6/MM3 (ref 4.6–6)
SODIUM BLD-SCNC: 136 MMOL/L (ref 136–145)
SP GR UR STRIP: 1.02 (ref 1–1.03)
T3FREE SERPL-MCNC: 2.08 PG/ML (ref 2–4.4)
T4 FREE SERPL-MCNC: 1.39 NG/DL (ref 0.93–1.7)
TSH SERPL DL<=0.05 MIU/L-ACNC: 4.67 MIU/ML (ref 0.27–4.2)
UROBILINOGEN UR QL STRIP: ABNORMAL
WBC NRBC COR # BLD: 11.86 10*3/MM3 (ref 4.5–10.7)

## 2017-06-30 PROCEDURE — 99232 SBSQ HOSP IP/OBS MODERATE 35: CPT | Performed by: FAMILY MEDICINE

## 2017-06-30 PROCEDURE — 80053 COMPREHEN METABOLIC PANEL: CPT | Performed by: FAMILY MEDICINE

## 2017-06-30 PROCEDURE — 97110 THERAPEUTIC EXERCISES: CPT

## 2017-06-30 PROCEDURE — 84481 FREE ASSAY (FT-3): CPT | Performed by: FAMILY MEDICINE

## 2017-06-30 PROCEDURE — 85025 COMPLETE CBC W/AUTO DIFF WBC: CPT | Performed by: FAMILY MEDICINE

## 2017-06-30 PROCEDURE — 81003 URINALYSIS AUTO W/O SCOPE: CPT | Performed by: FAMILY MEDICINE

## 2017-06-30 PROCEDURE — 84443 ASSAY THYROID STIM HORMONE: CPT | Performed by: FAMILY MEDICINE

## 2017-06-30 PROCEDURE — 84439 ASSAY OF FREE THYROXINE: CPT | Performed by: FAMILY MEDICINE

## 2017-06-30 PROCEDURE — 99024 POSTOP FOLLOW-UP VISIT: CPT | Performed by: PHYSICIAN ASSISTANT

## 2017-06-30 RX ORDER — HYDROCODONE BITARTRATE AND ACETAMINOPHEN 7.5; 325 MG/1; MG/1
1 TABLET ORAL EVERY 4 HOURS PRN
Refills: 0 | COMMUNITY
Start: 2017-06-30 | End: 2017-07-11 | Stop reason: HOSPADM

## 2017-06-30 RX ADMIN — GLIPIZIDE 5 MG: 5 TABLET ORAL at 07:57

## 2017-06-30 RX ADMIN — ATORVASTATIN CALCIUM 80 MG: 80 TABLET, FILM COATED ORAL at 07:57

## 2017-06-30 RX ADMIN — LEVOTHYROXINE SODIUM 75 MCG: 75 TABLET ORAL at 04:28

## 2017-06-30 RX ADMIN — GLIPIZIDE 5 MG: 5 TABLET ORAL at 17:30

## 2017-07-01 VITALS
TEMPERATURE: 97.5 F | RESPIRATION RATE: 18 BRPM | OXYGEN SATURATION: 98 % | DIASTOLIC BLOOD PRESSURE: 98 MMHG | BODY MASS INDEX: 23.59 KG/M2 | HEART RATE: 78 BPM | SYSTOLIC BLOOD PRESSURE: 183 MMHG | HEIGHT: 73 IN | WEIGHT: 178 LBS

## 2017-07-01 LAB
ANION GAP SERPL CALCULATED.3IONS-SCNC: 10.4 MMOL/L
BUN BLD-MCNC: 11 MG/DL (ref 8–23)
BUN/CREAT SERPL: 12.2 (ref 7–25)
CALCIUM SPEC-SCNC: 8.7 MG/DL (ref 8.6–10.5)
CHLORIDE SERPL-SCNC: 99 MMOL/L (ref 98–107)
CO2 SERPL-SCNC: 27.6 MMOL/L (ref 22–29)
CREAT BLD-MCNC: 0.9 MG/DL (ref 0.76–1.27)
GFR SERPL CREATININE-BSD FRML MDRD: 82 ML/MIN/1.73
GLUCOSE BLD-MCNC: 187 MG/DL (ref 65–99)
POTASSIUM BLD-SCNC: 3.9 MMOL/L (ref 3.5–5.2)
SODIUM BLD-SCNC: 137 MMOL/L (ref 136–145)

## 2017-07-01 PROCEDURE — 80048 BASIC METABOLIC PNL TOTAL CA: CPT | Performed by: FAMILY MEDICINE

## 2017-07-01 RX ADMIN — LEVOTHYROXINE SODIUM 75 MCG: 75 TABLET ORAL at 08:41

## 2017-07-01 RX ADMIN — ATORVASTATIN CALCIUM 80 MG: 80 TABLET, FILM COATED ORAL at 08:41

## 2017-07-01 RX ADMIN — GLIPIZIDE 5 MG: 5 TABLET ORAL at 08:41

## 2017-07-01 NOTE — SIGNIFICANT NOTE
07/01/17 1030   PT Discharge Summary   Anticipated Discharge Disposition adult day care   Reason for Discharge Discharge from facility   Discharge Destination SNF

## 2017-07-01 NOTE — DISCHARGE INSTR - LAB
Wound: Can shower daily. Clean the wound with peroxide tid, call with fever or chills, wound redness or drainage.     Restrictions: no lifting more than 5lbs, no bending or twisting, no prolonged sitting other than for PT/OT.

## 2017-07-03 ENCOUNTER — PATIENT OUTREACH (OUTPATIENT)
Dept: CASE MANAGEMENT | Facility: OTHER | Age: 77
End: 2017-07-03

## 2017-07-05 LAB
BACTERIA SPEC AEROBE CULT: NORMAL
BACTERIA SPEC AEROBE CULT: NORMAL

## 2017-07-06 RX ORDER — CYCLOBENZAPRINE HCL 5 MG
TABLET ORAL
Qty: 60 TABLET | Refills: 0 | Status: SHIPPED | OUTPATIENT
Start: 2017-07-06 | End: 2017-08-21 | Stop reason: SDUPTHER

## 2017-07-10 ENCOUNTER — PATIENT OUTREACH (OUTPATIENT)
Dept: CASE MANAGEMENT | Facility: OTHER | Age: 77
End: 2017-07-10

## 2017-07-11 ENCOUNTER — OFFICE VISIT (OUTPATIENT)
Dept: NEUROSURGERY | Facility: CLINIC | Age: 77
End: 2017-07-11

## 2017-07-11 VITALS
BODY MASS INDEX: 23.59 KG/M2 | WEIGHT: 178 LBS | SYSTOLIC BLOOD PRESSURE: 156 MMHG | DIASTOLIC BLOOD PRESSURE: 82 MMHG | HEART RATE: 76 BPM | HEIGHT: 73 IN

## 2017-07-11 DIAGNOSIS — Z09 SURGERY FOLLOW-UP EXAMINATION: Primary | ICD-10-CM

## 2017-07-11 PROBLEM — M48.061 STENOSIS, SPINAL, LUMBAR: Status: RESOLVED | Noted: 2017-06-27 | Resolved: 2017-07-11

## 2017-07-11 PROCEDURE — 99024 POSTOP FOLLOW-UP VISIT: CPT | Performed by: PHYSICIAN ASSISTANT

## 2017-07-11 NOTE — PROGRESS NOTES
Subjective   Patient ID: Anthony Hernandez is a 77 y.o. male is here today for follow-up.  He is 2 wks out from an L3-4, L4-5 lumbar decompression by Dr. Contreras 6 /27/17.  He is currently getting home health, PT and OT weekly.  He wife states he still has confusion and trouble with memory.  He is walking daily around the house. He states his left leg pain is better. He denies any incision problems. He takes Cyclobenzaprine 10 mg PRN for pain.     Back Pain   The pain is at a severity of 3/10. The pain is mild. Pertinent negatives include no bladder incontinence, bowel incontinence, fever, leg pain or weakness.       The following portions of the patient's history were reviewed and updated as appropriate: allergies, current medications, past family history, past medical history, past social history, past surgical history and problem list.    Review of Systems   Constitutional: Negative for fever.   Gastrointestinal: Negative for bowel incontinence.   Genitourinary: Negative for bladder incontinence and difficulty urinating.   Musculoskeletal: Positive for back pain.   Neurological: Negative for weakness.   All other systems reviewed and are negative.      Objective   Physical Exam   Neurological:   Incision ok     Neurologic Exam    Assessment/Plan   Independent Review of Radiographic Studies:      Medical Decision Making:    Mr. Hernandez is 2 weeks out from an L3-L5 open lumbar decompression.  He is doing very well and no longer has any radicular pain.  His lumbar incision has healed nicely and he is using only Tylenol as needed for discomfort.  He is now home and getting home physical therapy.     We did discuss his restrictions. He will cont with home PT F/u in 4wks.   Anthony was seen today for post-op.    Diagnoses and all orders for this visit:    Surgery follow-up examination    Return in about 4 weeks (around 8/8/2017).

## 2017-07-17 ENCOUNTER — PATIENT OUTREACH (OUTPATIENT)
Dept: CASE MANAGEMENT | Facility: OTHER | Age: 77
End: 2017-07-17

## 2017-07-21 ENCOUNTER — TELEPHONE (OUTPATIENT)
Dept: NEUROSURGERY | Facility: CLINIC | Age: 77
End: 2017-07-21

## 2017-07-21 DIAGNOSIS — M54.16 LUMBAR RADICULOPATHY: Primary | ICD-10-CM

## 2017-07-21 NOTE — TELEPHONE ENCOUNTER
This patient underwent L3-4, L4-5 decompression on 6/27/17.  I spoke with Diann, his home health therapist and she notes that he will be ready for outpatient physical therapy before his next scheduled followup on 8/10 with [facility].    She is wondering if we could provide an order for outpatient physical therapy so she can help the patient get this set up.      Can we give this patient an order for outpatient therapy?    Diann [phone]

## 2017-07-24 NOTE — TELEPHONE ENCOUNTER
Diann with [facility] called back and was advised [facility] said it was ok. The order was faxed to [facility].

## 2017-08-10 ENCOUNTER — OFFICE VISIT (OUTPATIENT)
Dept: NEUROSURGERY | Facility: CLINIC | Age: 77
End: 2017-08-10

## 2017-08-10 VITALS
BODY MASS INDEX: 23.59 KG/M2 | SYSTOLIC BLOOD PRESSURE: 144 MMHG | HEART RATE: 78 BPM | WEIGHT: 178 LBS | HEIGHT: 73 IN | DIASTOLIC BLOOD PRESSURE: 74 MMHG

## 2017-08-10 DIAGNOSIS — M54.16 LUMBAR RADICULOPATHY: ICD-10-CM

## 2017-08-10 DIAGNOSIS — Z09 SURGERY FOLLOW-UP EXAMINATION: Primary | ICD-10-CM

## 2017-08-10 PROCEDURE — 99024 POSTOP FOLLOW-UP VISIT: CPT | Performed by: PHYSICIAN ASSISTANT

## 2017-08-10 RX ORDER — METHYLPREDNISOLONE 4 MG/1
TABLET ORAL
Qty: 21 TABLET | Refills: 0 | Status: SHIPPED | OUTPATIENT
Start: 2017-08-10 | End: 2017-08-23

## 2017-08-10 NOTE — PROGRESS NOTES
Subjective   Patient ID: Anthony Hernandez is a 77 y.o. male is here today for follow-up. He is 6 weeks out from having a L3-4, L4-5 lumbar decompression by Dr. Contreras 6 /27/17. He has completed home health, physical and occupational therapy. He complains of left hip, thigh and pain behind the knee. Still has trouble walking for prolonged periods. He is currently taking Tylenol 500 mg PRN and Flexeril 5 mg PRN for pain.    Back Pain   The pain is at a severity of 5/10. The pain is moderate. Associated symptoms include weakness. Pertinent negatives include no numbness.       The following portions of the patient's history were reviewed and updated as appropriate: allergies, current medications, past family history, past medical history, past social history, past surgical history and problem list.    Review of Systems   Constitutional: Positive for activity change.   Genitourinary: Negative for difficulty urinating.   Musculoskeletal: Negative for back pain.   Neurological: Positive for weakness. Negative for numbness.   All other systems reviewed and are negative.      Objective   Physical Exam   Neurological:   Incision ok     Neurologic Exam    Assessment/Plan   Independent Review of Radiographic Studies:      Medical Decision Making:    Mr. Hernandez is 6wks s/p an open lumbar decompression. Last time he was here he was doing well. He now reports increased L leg pain, similar in location and severity to his preop pain. He is going to PT but they are unable to do much due to the severity of the pain.     I will give him a MDP and also order a new MRI. F/u with Dr. Contreras thereafter.   Anthony was seen today for post-op.    Diagnoses and all orders for this visit:    Surgery follow-up examination  -     MRI Lumbar Spine With & Without Contrast; Future  -     XR Spine Lumbar Complete With Flex & Ext; Future    Lumbar radiculopathy  -     MRI Lumbar Spine With & Without Contrast; Future  -     XR Spine Lumbar Complete  With Flex & Ext; Future    Other orders  -     MethylPREDNISolone (MEDROL, HERMAN,) 4 MG tablet; Take as directed on package instructions.      Return for follow up after radiology test with Dr Contreras.

## 2017-08-14 ENCOUNTER — TELEPHONE (OUTPATIENT)
Dept: NEUROSURGERY | Facility: CLINIC | Age: 77
End: 2017-08-14

## 2017-08-14 NOTE — TELEPHONE ENCOUNTER
FYI  Patient called today to give you an update on his left leg pain. He states that he his pain has gotten better since starting the MDP. He is able to bear weight on the left leg. He also wanted you to know the physical therapy will not change his routine until he has finished the steroid.

## 2017-08-17 ENCOUNTER — PATIENT OUTREACH (OUTPATIENT)
Dept: CASE MANAGEMENT | Facility: OTHER | Age: 77
End: 2017-08-17

## 2017-08-17 NOTE — OUTREACH NOTE
"Medrol dose pack completed 8/16/17 and reports pain level 8/10 in the morning but diminishes \"as day goes on.\"  Aware to contact neurosurgeon's office as needed for worsening pain, numbness or tingling in extremities.  Review of MRI scheduled appointment.  Continues with Outpatient Physical therapy.  Spouse to arrange follow-up appointment with PCP.  "

## 2017-08-21 RX ORDER — CYCLOBENZAPRINE HCL 5 MG
TABLET ORAL
Qty: 30 TABLET | Refills: 0 | Status: SHIPPED | OUTPATIENT
Start: 2017-08-21 | End: 2017-09-13 | Stop reason: SDUPTHER

## 2017-08-23 ENCOUNTER — TELEPHONE (OUTPATIENT)
Dept: NEUROSURGERY | Facility: CLINIC | Age: 77
End: 2017-08-23

## 2017-08-23 RX ORDER — METHYLPREDNISOLONE 4 MG/1
TABLET ORAL
Qty: 21 TABLET | Refills: 0 | Status: SHIPPED | OUTPATIENT
Start: 2017-08-23 | End: 2017-09-06

## 2017-08-23 NOTE — TELEPHONE ENCOUNTER
Patient's wife called and they were here 2 weeks ago.  The steroid pack made him a little better and now he back to where he was before the surgery.  They went to physical therapy and the therapist is suggesting to move up the mri and fu appt, get another steroid pack or traction to see if it gets better.   The patient can't walk far and still has pain in the leg.

## 2017-08-23 NOTE — TELEPHONE ENCOUNTER
I spoke to his wife she said he has completed MDP but he is still having trouble sitting for long periods of time and intermittent left leg pain. The physical therapist does not want to do much with him until he has his MRI which is scheduled 9/20/17 and follow up 9/27/17 with Dr. Contreras.     I will try to get the MRI and follow up moved up. Is there anything thing else we could do?  He takesTylenol 500 mg BID and Cyclobenzaprine 5 mg HS for pain. I know PT suggested another MDP or traction.

## 2017-08-23 NOTE — TELEPHONE ENCOUNTER
I spoke with Mrs. Hernandez I let her know that he is not not have any traction and that a second MDP has been sent to his pharmacy for him to take.    I also let her know that I will call her tomorrow with his updated MRI and follow up dates and times.

## 2017-08-24 NOTE — TELEPHONE ENCOUNTER
I spoke with Mrs. Hernandez gave her his new MRI and follow up appointment dates and times:     MRI 8/31/17 @7:15   Follow up with Dr. Contreras 9/6/17 at 8:30

## 2017-08-31 ENCOUNTER — HOSPITAL ENCOUNTER (OUTPATIENT)
Dept: MRI IMAGING | Facility: HOSPITAL | Age: 77
Discharge: HOME | End: 2017-08-31
Admitting: PHYSICIAN ASSISTANT

## 2017-08-31 ENCOUNTER — HOSPITAL ENCOUNTER (OUTPATIENT)
Dept: GENERAL RADIOLOGY | Facility: HOSPITAL | Age: 77
Discharge: HOME | End: 2017-08-31

## 2017-08-31 DIAGNOSIS — M54.16 LUMBAR RADICULOPATHY: ICD-10-CM

## 2017-08-31 DIAGNOSIS — Z09 SURGERY FOLLOW-UP EXAMINATION: ICD-10-CM

## 2017-08-31 DIAGNOSIS — M51.36 DDD (DEGENERATIVE DISC DISEASE), LUMBAR: ICD-10-CM

## 2017-08-31 DIAGNOSIS — M48.061 LUMBAR CANAL STENOSIS: ICD-10-CM

## 2017-08-31 PROCEDURE — 72158 MRI LUMBAR SPINE W/O & W/DYE: CPT

## 2017-08-31 PROCEDURE — 72114 X-RAY EXAM L-S SPINE BENDING: CPT

## 2017-08-31 PROCEDURE — A9577 INJ MULTIHANCE: HCPCS | Performed by: PHYSICIAN ASSISTANT

## 2017-08-31 PROCEDURE — 82565 ASSAY OF CREATININE: CPT

## 2017-08-31 PROCEDURE — 0 GADOBENATE DIMEGLUMINE 529 MG/ML SOLUTION: Performed by: PHYSICIAN ASSISTANT

## 2017-08-31 RX ADMIN — GADOBENATE DIMEGLUMINE 16 ML: 529 INJECTION, SOLUTION INTRAVENOUS at 13:33

## 2017-09-01 LAB — CREAT BLDA-MCNC: 1.1 MG/DL (ref 0.6–1.3)

## 2017-09-06 ENCOUNTER — OFFICE VISIT (OUTPATIENT)
Dept: NEUROSURGERY | Facility: CLINIC | Age: 77
End: 2017-09-06

## 2017-09-06 VITALS
HEIGHT: 73 IN | SYSTOLIC BLOOD PRESSURE: 135 MMHG | BODY MASS INDEX: 23.59 KG/M2 | WEIGHT: 178 LBS | DIASTOLIC BLOOD PRESSURE: 82 MMHG | HEART RATE: 71 BPM

## 2017-09-06 DIAGNOSIS — Z09 SURGERY FOLLOW-UP EXAMINATION: Primary | ICD-10-CM

## 2017-09-06 PROCEDURE — 99024 POSTOP FOLLOW-UP VISIT: CPT | Performed by: NEUROLOGICAL SURGERY

## 2017-09-06 RX ORDER — GABAPENTIN 100 MG/1
100 CAPSULE ORAL 2 TIMES DAILY
Qty: 120 CAPSULE | Refills: 1 | Status: SHIPPED | OUTPATIENT
Start: 2017-09-06 | End: 2017-11-06 | Stop reason: SDUPTHER

## 2017-09-06 NOTE — PROGRESS NOTES
Subjective   Patient ID: Anthony Hernandez is a 77 y.o. male is here today for follow-up. He is 2 months out from havinga L3-4, L4-5 lumbar decompression by Dr. Contrersa 6 /27/17. At last visit he stated that he completed home health, physical and occupational therapy. He was still having left hip, thigh and pain behind the knee, so he given a Rx for a MDP. He was also given orders to have a lumbar MRI and x-ray.     Today he is here with a new lumbar MRI and x-ray. He still complains of the left hip, thigh and knee pain. He is currently taking Flexeril 5 mg PRN for pain.    History of Present Illness    The following portions of the patient's history were reviewed and updated as appropriate: allergies, current medications, past family history, past medical history, past social history, past surgical history and problem list.    Review of Systems   All other systems reviewed and are negative.    He is with his wife. It has been about 2-1/2 months since his open lumbar decompression at L3-L4 and L4-L5 and the left L4-L5 discectomy. He had been having mainly left buttock and leg pain prior to the surgery. He did well for about 6 weeks and then began having recurrent pain in his left leg. He has been going to physical therapy, and the return of the symptoms may have been linked to more activity. We discussed his new MRI and X-rays with him and his wife. I think there is some evidence of a recurrent L4-L5 herniated disc, although that is not specifically stated in the report. That is based on my interpretation. But clinically, he seems to be behaving that way. I suggested we try some gabapentin, which he has not been on, at 100 mg b.i.d. We may need to raise it subsequent to that, but I would like to avoid another surgery if at all possible. He and his wife agree with that. They will follow up with Madonna in about 8 weeks.       Objective   Physical Exam   Constitutional: He appears well-developed and well-nourished.   HENT:    Head: Normocephalic and atraumatic.   Eyes: Conjunctivae are normal. Pupils are equal, round, and reactive to light.   Fundoscopic exam:       The right eye shows no papilledema. The right eye shows venous pulsations.        The left eye shows no papilledema. The left eye shows venous pulsations.   Neck: Carotid bruit is not present.     Neurologic Exam     Cranial Nerves     CN III, IV, VI   Pupils are equal, round, and reactive to light.      Assessment/Plan   Independent Review of Radiographic Studies:    The lumbar MRI done 08/31/2017 was reviewed. I think the bony decompression is adequate. There seems to be recurrence of the left L4-L5 herniated disc. That is not specifically stated in the report, but that is what I see upon my review.       Medical Decision Making:    We decided to put him on gabapentin at 100 mg b.i.d. I asked him to call us in about 2 or 3 weeks to let us know if it is helping or if it is causing side effects. If it is not helping and not causing side effects, I would raise it to 200 mg b.i.d. He will follow up with our PA in about 8 weeks. I think we should try and maximize the use of gabapentin. If that does not help, then myelography and return visit to see me would be the thing to do. I told him to not go back to physical therapy and just to do walking activities on his own.      Anthony was seen today for post-op.    Diagnoses and all orders for this visit:    Surgery follow-up examination    Other orders  -     gabapentin (NEURONTIN) 100 MG capsule; Take 1 capsule by mouth 2 (Two) Times a Day.    Return in about 2 months (around 11/6/2017) for with Maye

## 2017-09-13 RX ORDER — CYCLOBENZAPRINE HCL 5 MG
TABLET ORAL
Qty: 30 TABLET | Refills: 0 | Status: SHIPPED | OUTPATIENT
Start: 2017-09-13 | End: 2017-12-15 | Stop reason: SINTOL

## 2017-09-14 ENCOUNTER — OFFICE VISIT (OUTPATIENT)
Dept: FAMILY MEDICINE CLINIC | Facility: CLINIC | Age: 77
End: 2017-09-14

## 2017-09-14 VITALS
HEIGHT: 72 IN | TEMPERATURE: 98.3 F | WEIGHT: 195 LBS | OXYGEN SATURATION: 97 % | SYSTOLIC BLOOD PRESSURE: 172 MMHG | DIASTOLIC BLOOD PRESSURE: 94 MMHG | BODY MASS INDEX: 26.41 KG/M2

## 2017-09-14 DIAGNOSIS — H91.93 HEARING LOSS OF BOTH EARS: Primary | ICD-10-CM

## 2017-09-14 DIAGNOSIS — E11.9 DIABETES MELLITUS TYPE 2, NONINSULIN DEPENDENT (HCC): ICD-10-CM

## 2017-09-14 DIAGNOSIS — H61.21 IMPACTED CERUMEN OF RIGHT EAR: ICD-10-CM

## 2017-09-14 PROCEDURE — 99213 OFFICE O/P EST LOW 20 MIN: CPT | Performed by: FAMILY MEDICINE

## 2017-09-14 PROCEDURE — 69209 REMOVE IMPACTED EAR WAX UNI: CPT | Performed by: FAMILY MEDICINE

## 2017-09-14 NOTE — PROGRESS NOTES
"  Chief Complaint   Patient presents with   • Ear Fullness     pt would like to get ears check today        Subjective.../HPI  Patient present today with chronic loss of hearing in left ear since service. Right ear is main hearing ear    I have reviewed the patient's medical history in detail and updated the computerized patient record.    Family History   Problem Relation Age of Onset   • Breast cancer Mother    • Cancer Mother    • Brain cancer Sister    • Other Brother      CABG   • Stroke Brother    • Heart disease Brother    • Malig Hyperthermia Neg Hx        Social History     Social History   • Marital status:      Spouse name: N/A   • Number of children: N/A   • Years of education: N/A     Occupational History   • Not on file.     Social History Main Topics   • Smoking status: Former Smoker     Packs/day: 1.00     Years: 20.00     Quit date: 1985   • Smokeless tobacco: Not on file   • Alcohol use No      Comment: RARE 3-4 YR   • Drug use: No   • Sexual activity: Defer     Other Topics Concern   • Not on file     Social History Narrative       Review of Systems:   Review of Systems   Constitutional: Positive for unexpected weight change.   HENT: Positive for ear discharge and hearing loss.    Eyes: Negative.    Respiratory: Negative.    Cardiovascular: Negative.    Gastrointestinal: Negative.    Endocrine: Negative.    Genitourinary: Negative.    Musculoskeletal: Positive for back pain.   Allergic/Immunologic: Negative.    Neurological: Negative.    Hematological: Negative.    Psychiatric/Behavioral: Negative.        Objective:  Vital Signs     Vitals:    09/14/17 1455   BP: 172/94   BP Location: Right arm   Patient Position: Sitting   Temp: 98.3 °F (36.8 °C)   TempSrc: Oral   SpO2: 97%   Weight: 195 lb (88.5 kg)   Height: 72\" (182.9 cm)     Physical Exam   Constitutional: He is oriented to person, place, and time. He appears well-developed and well-nourished.   HENT:   Head: Normocephalic.   Left Ear: " External ear normal.   Right ear- small piece of wax in right ear and may be against tm   Eyes: Pupils are equal, round, and reactive to light.   Neck: Normal range of motion.   Cardiovascular: Normal rate, regular rhythm and normal heart sounds.    Pulmonary/Chest: Effort normal.   Abdominal: Soft.   Musculoskeletal: Normal range of motion.   Neurological: He is alert and oriented to person, place, and time.   Skin: Skin is warm and dry.        Results Review:      REVIEWED AND DISCUSSED CLINICAL RESULTS WITH PATIENT                          Current Outpatient Prescriptions:   •  cyclobenzaprine (FLEXERIL) 5 MG tablet, TAKE 1 TABLET EVERY EVENING, Disp: 30 tablet, Rfl: 0  •  Ferrous Sulfate 27 MG tablet, Take 27 mg by mouth Every Night., Disp: , Rfl:   •  glipiZIDE (GLUCOTROL) 5 MG tablet, Take 1 tablet by mouth 2 (Two) Times a Day Before Meals., Disp: 180 tablet, Rfl: 1  •  Lancets (ONETOUCH ULTRASOFT) lancets, Daily fasting glucose, Disp: 100 each, Rfl: 12  •  levothyroxine (SYNTHROID) 75 MCG tablet, Take 1 tablet by mouth Daily. (Patient taking differently: Take 50 mcg by mouth Daily.), Disp: 90 tablet, Rfl: 1  •  ONE TOUCH ULTRA TEST test strip, 1 each by Other route 2 (Two) Times a Day., Disp: 100 each, Rfl: 5  •  atorvastatin (LIPITOR) 80 MG tablet, Take 1 tablet by mouth Daily., Disp: 90 tablet, Rfl: 1  •  gabapentin (NEURONTIN) 100 MG capsule, Take 1 capsule by mouth 2 (Two) Times a Day., Disp: 120 capsule, Rfl: 1    Ear Cerumen Removal Instrumentation  Date/Time: 9/14/2017 3:57 PM  Performed by: MARIN HERNÁNDEZ JR.  Authorized by: MARIN HERNÁNDEZ JR.     Anesthesia:  Local Anesthetic: none  Location details: right ear  Procedure type: irrigation    Sedation:  Patient sedated: no  Patient tolerance: Patient tolerated the procedure well with no immediate complications          Assessment/Plan     Diagnoses and all orders for this visit:    Hearing loss of both ears    Impacted cerumen of right  ear    Other orders  -     Ear Cerumen Removal           Luis Darnell Jr., MD  09/14/17  4:02 PM

## 2017-10-11 ENCOUNTER — OFFICE VISIT (OUTPATIENT)
Dept: FAMILY MEDICINE CLINIC | Facility: CLINIC | Age: 77
End: 2017-10-11

## 2017-10-11 VITALS
DIASTOLIC BLOOD PRESSURE: 72 MMHG | HEIGHT: 72 IN | WEIGHT: 197 LBS | TEMPERATURE: 98.7 F | HEART RATE: 85 BPM | OXYGEN SATURATION: 96 % | BODY MASS INDEX: 26.68 KG/M2 | SYSTOLIC BLOOD PRESSURE: 130 MMHG

## 2017-10-11 DIAGNOSIS — Z23 IMMUNIZATION DUE: Primary | ICD-10-CM

## 2017-10-11 DIAGNOSIS — R79.9 ABNORMAL FINDING OF BLOOD CHEMISTRY: ICD-10-CM

## 2017-10-11 DIAGNOSIS — R41.82 ALTERED MENTAL STATUS, UNSPECIFIED ALTERED MENTAL STATUS TYPE: ICD-10-CM

## 2017-10-11 DIAGNOSIS — R41.0 CONFUSION: ICD-10-CM

## 2017-10-11 PROCEDURE — 99213 OFFICE O/P EST LOW 20 MIN: CPT | Performed by: FAMILY MEDICINE

## 2017-10-11 PROCEDURE — G0008 ADMIN INFLUENZA VIRUS VAC: HCPCS | Performed by: FAMILY MEDICINE

## 2017-10-11 RX ORDER — ASPIRIN 325 MG
325 TABLET, DELAYED RELEASE (ENTERIC COATED) ORAL DAILY
Start: 2017-10-11 | End: 2019-07-10 | Stop reason: DRUGHIGH

## 2017-10-11 NOTE — PROGRESS NOTES
"  Chief Complaint   Patient presents with   • Altered Mental Status     pt since june 2017 is been having confusion episodes had one this morning        Subjective.../HPI  Patient present today with episode of confusion today. No focal signs. Wife and son states that he did not look good thia am and may have had some face droop. Did use cpap last pm.     I have reviewed the patient's medical history in detail and updated the computerized patient record.    Family History   Problem Relation Age of Onset   • Breast cancer Mother    • Cancer Mother    • Brain cancer Sister    • Other Brother      CABG   • Stroke Brother    • Heart disease Brother    • Malig Hyperthermia Neg Hx        Social History     Social History   • Marital status:      Spouse name: N/A   • Number of children: N/A   • Years of education: N/A     Occupational History   • Not on file.     Social History Main Topics   • Smoking status: Former Smoker     Packs/day: 1.00     Years: 20.00     Quit date: 1985   • Smokeless tobacco: Not on file   • Alcohol use No      Comment: RARE 3-4 YR   • Drug use: No   • Sexual activity: Defer     Other Topics Concern   • Not on file     Social History Narrative       Review of Systems:   Review of Systems   Constitutional: Negative.    HENT: Positive for hearing loss.    Respiratory: Positive for apnea (sleep apnea).    Gastrointestinal: Negative.    Musculoskeletal: Negative.    Skin: Negative.    Neurological: Positive for dizziness.   Hematological: Negative.    Psychiatric/Behavioral: Positive for confusion.       Objective:  Vital Signs     Vitals:    10/11/17 1554   BP: 130/72   BP Location: Left arm   Patient Position: Sitting   Pulse: 85   Temp: 98.7 °F (37.1 °C)   TempSrc: Oral   SpO2: 96%   Weight: 197 lb (89.4 kg)   Height: 72\" (182.9 cm)     Physical Exam   Constitutional: He is oriented to person, place, and time. He appears well-developed and well-nourished.   HENT:   Head: Normocephalic. "   Eyes: Pupils are equal, round, and reactive to light.   Neck: Normal range of motion.   Cardiovascular: Normal rate, regular rhythm and normal heart sounds.    Pulmonary/Chest: Effort normal.   Abdominal: Soft.   Musculoskeletal: Normal range of motion.   Neurological: He is alert and oriented to person, place, and time.   Skin: Skin is warm and dry.        Results Review:      REVIEWED AND DISCUSSED CLINICAL RESULTS WITH PATIENT                          Current Outpatient Prescriptions:   •  atorvastatin (LIPITOR) 80 MG tablet, Take 1 tablet by mouth Daily., Disp: 90 tablet, Rfl: 1  •  cyclobenzaprine (FLEXERIL) 5 MG tablet, TAKE 1 TABLET EVERY EVENING, Disp: 30 tablet, Rfl: 0  •  Ferrous Sulfate 27 MG tablet, Take 27 mg by mouth Every Night., Disp: , Rfl:   •  gabapentin (NEURONTIN) 100 MG capsule, Take 1 capsule by mouth 2 (Two) Times a Day., Disp: 120 capsule, Rfl: 1  •  glipiZIDE (GLUCOTROL) 5 MG tablet, Take 1 tablet by mouth 2 (Two) Times a Day Before Meals., Disp: 180 tablet, Rfl: 1  •  Lancets (ONETOUCH ULTRASOFT) lancets, Daily fasting glucose, Disp: 100 each, Rfl: 12  •  levothyroxine (SYNTHROID) 75 MCG tablet, Take 1 tablet by mouth Daily. (Patient taking differently: Take 50 mcg by mouth Daily.), Disp: 90 tablet, Rfl: 1  •  ONE TOUCH ULTRA TEST test strip, 1 each by Other route Daily., Disp: 100 each, Rfl: 5  •  aspirin  MG tablet, Take 1 tablet by mouth Daily., Disp: , Rfl:     Procedures    Assessment/Plan     Diagnoses and all orders for this visit:    Immunization due  -     Flu Vaccine High Dose PF 65YR+  -     MRI angiogram neck w wo contrast  -     MRI angiogram head w wo contrast  -     MRI Brain Without Contrast  -     CBC & Differential  -     Comprehensive Metabolic Panel  -     Thyroid Panel With TSH  -     Hemoglobin A1c  -     Lipid Panel With LDL / HDL Ratio    Confusion  -     MRI angiogram neck w wo contrast  -     MRI angiogram head w wo contrast  -     MRI Brain Without  Contrast  -     CBC & Differential  -     Comprehensive Metabolic Panel  -     Thyroid Panel With TSH  -     Hemoglobin A1c  -     Lipid Panel With LDL / HDL Ratio    Altered mental status, unspecified altered mental status type  -     MRI angiogram neck w wo contrast  -     MRI angiogram head w wo contrast  -     MRI Brain Without Contrast  -     CBC & Differential  -     Comprehensive Metabolic Panel  -     Thyroid Panel With TSH  -     Hemoglobin A1c  -     Lipid Panel With LDL / HDL Ratio    Abnormal finding of blood chemistry   -     Hemoglobin A1c  -     Lipid Panel With LDL / HDL Ratio    Other orders  -     aspirin  MG tablet; Take 1 tablet by mouth Daily.  wife and son here with pt and they did not want to admit pt to hospital. Worried about increased confusion in hospital   ate 3 mints today with last about 1.5-2 hours ago  On asa 81 mg and increase to 325 mg  Luis Darnell Jr., MD  10/11/17  4:41 PM

## 2017-10-12 ENCOUNTER — LAB (OUTPATIENT)
Dept: LAB | Facility: HOSPITAL | Age: 77
End: 2017-10-12

## 2017-10-12 DIAGNOSIS — R79.9 ABNORMAL BLOOD CHEMISTRY: ICD-10-CM

## 2017-10-12 DIAGNOSIS — R41.0 CONFUSION: Primary | ICD-10-CM

## 2017-10-12 DIAGNOSIS — Z23 IMMUNIZATION DUE: ICD-10-CM

## 2017-10-12 DIAGNOSIS — R41.82 ALTERED MENTAL STATUS, UNSPECIFIED ALTERED MENTAL STATUS TYPE: ICD-10-CM

## 2017-10-12 LAB
ALBUMIN SERPL-MCNC: 4.2 G/DL (ref 3.5–5.2)
ALBUMIN/GLOB SERPL: 1.5 G/DL
ALP SERPL-CCNC: 119 U/L (ref 39–117)
ALT SERPL W P-5'-P-CCNC: 19 U/L (ref 1–41)
ANION GAP SERPL CALCULATED.3IONS-SCNC: 11.3 MMOL/L
AST SERPL-CCNC: 20 U/L (ref 1–40)
BASOPHILS # BLD AUTO: 0.07 10*3/MM3 (ref 0–0.2)
BASOPHILS NFR BLD AUTO: 0.8 % (ref 0–1.5)
BILIRUB SERPL-MCNC: 0.7 MG/DL (ref 0.1–1.2)
BUN BLD-MCNC: 11 MG/DL (ref 8–23)
BUN/CREAT SERPL: 10 (ref 7–25)
CALCIUM SPEC-SCNC: 9.6 MG/DL (ref 8.6–10.5)
CHLORIDE SERPL-SCNC: 101 MMOL/L (ref 98–107)
CHOLEST SERPL-MCNC: 134 MG/DL (ref 0–200)
CO2 SERPL-SCNC: 27.7 MMOL/L (ref 22–29)
CREAT BLD-MCNC: 1.1 MG/DL (ref 0.76–1.27)
DEPRECATED RDW RBC AUTO: 42.2 FL (ref 37–54)
EOSINOPHIL # BLD AUTO: 0.28 10*3/MM3 (ref 0–0.7)
EOSINOPHIL NFR BLD AUTO: 3.3 % (ref 0.3–6.2)
ERYTHROCYTE [DISTWIDTH] IN BLOOD BY AUTOMATED COUNT: 12.6 % (ref 11.5–14.5)
GFR SERPL CREATININE-BSD FRML MDRD: 65 ML/MIN/1.73
GLOBULIN UR ELPH-MCNC: 2.8 GM/DL
GLUCOSE BLD-MCNC: 164 MG/DL (ref 65–99)
HBA1C MFR BLD: 7.74 % (ref 4.8–5.6)
HCT VFR BLD AUTO: 39.5 % (ref 40.4–52.2)
HDLC SERPL-MCNC: 49 MG/DL (ref 40–60)
HGB BLD-MCNC: 13.3 G/DL (ref 13.7–17.6)
IMM GRANULOCYTES # BLD: 0.02 10*3/MM3 (ref 0–0.03)
IMM GRANULOCYTES NFR BLD: 0.2 % (ref 0–0.5)
LDLC SERPL CALC-MCNC: 69 MG/DL (ref 0–100)
LDLC/HDLC SERPL: 1.41 {RATIO}
LYMPHOCYTES # BLD AUTO: 1.71 10*3/MM3 (ref 0.9–4.8)
LYMPHOCYTES NFR BLD AUTO: 20.5 % (ref 19.6–45.3)
MCH RBC QN AUTO: 30.7 PG (ref 27–32.7)
MCHC RBC AUTO-ENTMCNC: 33.7 G/DL (ref 32.6–36.4)
MCV RBC AUTO: 91.2 FL (ref 79.8–96.2)
MONOCYTES # BLD AUTO: 0.58 10*3/MM3 (ref 0.2–1.2)
MONOCYTES NFR BLD AUTO: 6.9 % (ref 5–12)
NEUTROPHILS # BLD AUTO: 5.7 10*3/MM3 (ref 1.9–8.1)
NEUTROPHILS NFR BLD AUTO: 68.3 % (ref 42.7–76)
PLATELET # BLD AUTO: 194 10*3/MM3 (ref 140–500)
PMV BLD AUTO: 10.7 FL (ref 6–12)
POTASSIUM BLD-SCNC: 4.7 MMOL/L (ref 3.5–5.2)
PROT SERPL-MCNC: 7 G/DL (ref 6–8.5)
RBC # BLD AUTO: 4.33 10*6/MM3 (ref 4.6–6)
SODIUM BLD-SCNC: 140 MMOL/L (ref 136–145)
T-UPTAKE NFR SERPL: 1.02 TBI (ref 0.8–1.3)
T4 SERPL-MCNC: 6.78 MCG/DL (ref 4.5–11.7)
TRIGL SERPL-MCNC: 79 MG/DL (ref 0–150)
TSH SERPL DL<=0.05 MIU/L-ACNC: 2.76 MIU/ML (ref 0.27–4.2)
VLDLC SERPL-MCNC: 15.8 MG/DL (ref 5–40)
WBC NRBC COR # BLD: 8.36 10*3/MM3 (ref 4.5–10.7)

## 2017-10-12 PROCEDURE — 80053 COMPREHEN METABOLIC PANEL: CPT | Performed by: FAMILY MEDICINE

## 2017-10-12 PROCEDURE — 85025 COMPLETE CBC W/AUTO DIFF WBC: CPT | Performed by: FAMILY MEDICINE

## 2017-10-12 PROCEDURE — 83036 HEMOGLOBIN GLYCOSYLATED A1C: CPT | Performed by: FAMILY MEDICINE

## 2017-10-12 PROCEDURE — 84436 ASSAY OF TOTAL THYROXINE: CPT | Performed by: FAMILY MEDICINE

## 2017-10-12 PROCEDURE — 36415 COLL VENOUS BLD VENIPUNCTURE: CPT | Performed by: FAMILY MEDICINE

## 2017-10-12 PROCEDURE — 84479 ASSAY OF THYROID (T3 OR T4): CPT | Performed by: FAMILY MEDICINE

## 2017-10-12 PROCEDURE — 84443 ASSAY THYROID STIM HORMONE: CPT | Performed by: FAMILY MEDICINE

## 2017-10-12 PROCEDURE — 80061 LIPID PANEL: CPT

## 2017-10-16 DIAGNOSIS — Z00.00 PREVENTATIVE HEALTH CARE: Primary | ICD-10-CM

## 2017-10-17 ENCOUNTER — OFFICE VISIT (OUTPATIENT)
Dept: SLEEP MEDICINE | Facility: HOSPITAL | Age: 77
End: 2017-10-17
Attending: INTERNAL MEDICINE

## 2017-10-17 VITALS
HEIGHT: 73 IN | OXYGEN SATURATION: 99 % | WEIGHT: 198 LBS | DIASTOLIC BLOOD PRESSURE: 94 MMHG | SYSTOLIC BLOOD PRESSURE: 185 MMHG | BODY MASS INDEX: 26.24 KG/M2 | HEART RATE: 73 BPM

## 2017-10-17 DIAGNOSIS — I65.23 ATHEROSCLEROSIS OF BOTH CAROTID ARTERIES: Primary | ICD-10-CM

## 2017-10-17 DIAGNOSIS — G47.33 OSA (OBSTRUCTIVE SLEEP APNEA): Primary | ICD-10-CM

## 2017-10-17 PROCEDURE — G0463 HOSPITAL OUTPT CLINIC VISIT: HCPCS

## 2017-10-17 NOTE — PROGRESS NOTES
[facility] SLEEP MEDICINE      Anthony Hernandez  77 y.o.  male  1940    PCP:Luis Darnell Jr., MD      Type of service: Initial consult/Visit    Chief complaint: Snoring, ZAIN      History of present illness;  This is a 77 y.o. male with a history of sleep apnea.  He was previously seen by me in 2014 at [facility] sleep [place].  He underwent polysomnography and showed that patient has moderate sleep apnea with AHI of 21 per hour.  He is on auto CPAP.  He is to use the CPAP on a regular basis and that his compliance was good.  Recently he is having problems with the mask and not able to use the CPAP, in addition he had lumbar decompression for his back pain and still having some problems.  He is not able to use CPAP on a regular basis.    Past Medical History:   Diagnosis Date   • Acute myocardial infarction    • Anesthesia complication     DIFFICULTY COMING OUT OF SEDATION   • Aortic valve sclerosis    • Coronary artery disease     History of remote anterior wall myocardial infarction in 1989.   • Diabetes mellitus    • Disease of thyroid gland    • Hearing difficulty of both ears     Normally wears hearing aids   • Heart attack    • Intermittent claudication    • Osteoarthritis    • Peripheral vascular disease    • Skin cancer    • Sleep apnea     cpap, can't use due to drooling   • Spinal stenosis        Medications:    Current Outpatient Prescriptions:   •  aspirin  MG tablet, Take 1 tablet by mouth Daily., Disp: , Rfl:   •  atorvastatin (LIPITOR) 80 MG tablet, Take 1 tablet by mouth Daily., Disp: 90 tablet, Rfl: 1  •  cyclobenzaprine (FLEXERIL) 5 MG tablet, TAKE 1 TABLET EVERY EVENING, Disp: 30 tablet, Rfl: 0  •  Ferrous Sulfate 27 MG tablet, Take 27 mg by mouth Every Night., Disp: , Rfl:   •  gabapentin (NEURONTIN) 100 MG capsule, Take 1 capsule by mouth 2 (Two) Times a Day., Disp: 120 capsule, Rfl: 1  •  glipiZIDE (GLUCOTROL) 5 MG tablet, Take 1 tablet by mouth 2 (Two) Times a Day  "Before Meals., Disp: 180 tablet, Rfl: 1  •  Lancets (ONETOUCH ULTRASOFT) lancets, Daily fasting glucose, Disp: 100 each, Rfl: 12  •  levothyroxine (SYNTHROID) 75 MCG tablet, Take 1 tablet by mouth Daily. (Patient taking differently: Take 50 mcg by mouth Daily.), Disp: 90 tablet, Rfl: 1  •  ONE TOUCH ULTRA TEST test strip, 1 each by Other route Daily., Disp: 100 each, Rfl: 5    Social history:  Shift work: no  Tobacco use: no, quit  Alcohol use: no  Caffeinated drinks: 3 coffee  Over-the-counter sleeping aid: no  Narcotic medications: no    Review of systems:  [place] Sleepiness Scale: 6  Positive for snoring, witnessed apneas, fatigue and daytime excessive sleepiness,   Negative for shortness of breath, cough, wheezing, chest pain, nausea and vomiting,     Physical exam:  BP (!) 185/94 (BP Location: Left arm, Patient Position: Sitting)  Pulse 73  Ht 73\" (185.4 cm)  Wt 198 lb (89.8 kg)  SpO2 99%  BMI 26.12 kg/m2    PHYSICAL EXAMINATION:  HEENT: Head is atraumatic, pupils are round equal and reacting to light,  no nasal septal defects or deviation and the nasal passages are clear, tonsils are not enlarged, oral airway Mallampati class III  NECK: No lymphadenopathy, trachea is in the midline  RESPIRATORY SYSTEM: Breath sounds are equal on both sides, there are no wheezes or crackles  CARDIOVASULAR SYSTEM: Heart sounds are regular and normal, there are no murmurs or thrills  ABDOMEN: Soft, no hepatosplenomegaly, no evidence of ascites  EXTREMITES: No cyanosis, clubbing or edema   NEUROLOGICAL SYSTEM: Oriented x 3, no gross neurological defects    Assessment and plan:  · Obstructive sleep apnea: Patient has moderate sleep apnea is AHI is 21 per hour.  I have reviewed the sleep study which was done and did not sleep center in March of year 2012.  He is being fitted with a new mask and will see him next week for follow-up       Carmen Schroeder MD, [facility]  Pulmonary, Critical Care and sleep Medicine     "

## 2017-10-20 ENCOUNTER — HOSPITAL ENCOUNTER (OUTPATIENT)
Dept: MRI IMAGING | Facility: HOSPITAL | Age: 77
Discharge: HOME | End: 2017-10-20
Attending: FAMILY MEDICINE

## 2017-10-20 ENCOUNTER — HOSPITAL ENCOUNTER (OUTPATIENT)
Dept: MRI IMAGING | Facility: HOSPITAL | Age: 77
Discharge: HOME | End: 2017-10-20
Attending: FAMILY MEDICINE | Admitting: FAMILY MEDICINE

## 2017-10-20 DIAGNOSIS — I65.23 ATHEROSCLEROSIS OF BOTH CAROTID ARTERIES: ICD-10-CM

## 2017-10-20 DIAGNOSIS — Z00.00 PREVENTATIVE HEALTH CARE: ICD-10-CM

## 2017-10-20 LAB — CREAT BLDA-MCNC: 0.9 MG/DL (ref 0.6–1.3)

## 2017-10-20 PROCEDURE — 70549 MR ANGIOGRAPH NECK W/O&W/DYE: CPT

## 2017-10-20 PROCEDURE — 82565 ASSAY OF CREATININE: CPT

## 2017-10-20 PROCEDURE — 70544 MR ANGIOGRAPHY HEAD W/O DYE: CPT

## 2017-10-20 PROCEDURE — 0 GADOBENATE DIMEGLUMINE 529 MG/ML SOLUTION: Performed by: FAMILY MEDICINE

## 2017-10-20 PROCEDURE — A9577 INJ MULTIHANCE: HCPCS | Performed by: FAMILY MEDICINE

## 2017-10-20 PROCEDURE — 70553 MRI BRAIN STEM W/O & W/DYE: CPT

## 2017-10-20 RX ADMIN — GADOBENATE DIMEGLUMINE 18 ML: 529 INJECTION, SOLUTION INTRAVENOUS at 16:15

## 2017-10-21 ENCOUNTER — APPOINTMENT (OUTPATIENT)
Dept: MRI IMAGING | Facility: HOSPITAL | Age: 77
End: 2017-10-21
Attending: FAMILY MEDICINE

## 2017-10-27 ENCOUNTER — DOCUMENTATION (OUTPATIENT)
Dept: FAMILY MEDICINE CLINIC | Facility: CLINIC | Age: 77
End: 2017-10-27

## 2017-10-28 NOTE — PROGRESS NOTES
I told the patient the results of his MRI/MRA.  I also told him the results of his blood tests.  He states that Dr. Иван Funes has been following his carotids for a long time.  He does take an aspirin a day.

## 2017-10-31 RX ORDER — GLIPIZIDE 5 MG/1
TABLET ORAL
Qty: 180 TABLET | Refills: 1 | Status: SHIPPED | OUTPATIENT
Start: 2017-10-31 | End: 2018-05-08 | Stop reason: SDUPTHER

## 2017-11-06 ENCOUNTER — OFFICE VISIT (OUTPATIENT)
Dept: NEUROSURGERY | Facility: CLINIC | Age: 77
End: 2017-11-06

## 2017-11-06 VITALS
HEIGHT: 73 IN | BODY MASS INDEX: 26.37 KG/M2 | HEART RATE: 81 BPM | SYSTOLIC BLOOD PRESSURE: 127 MMHG | WEIGHT: 199 LBS | DIASTOLIC BLOOD PRESSURE: 75 MMHG

## 2017-11-06 DIAGNOSIS — M54.16 LUMBAR RADICULOPATHY: ICD-10-CM

## 2017-11-06 DIAGNOSIS — M51.36 DDD (DEGENERATIVE DISC DISEASE), LUMBAR: Primary | ICD-10-CM

## 2017-11-06 DIAGNOSIS — R41.0 EPISODIC CONFUSION: ICD-10-CM

## 2017-11-06 PROBLEM — Z09 SURGERY FOLLOW-UP EXAMINATION: Status: RESOLVED | Noted: 2017-07-11 | Resolved: 2017-11-06

## 2017-11-06 PROCEDURE — 99214 OFFICE O/P EST MOD 30 MIN: CPT | Performed by: PHYSICIAN ASSISTANT

## 2017-11-06 RX ORDER — GABAPENTIN 100 MG/1
200 CAPSULE ORAL 2 TIMES DAILY
Qty: 120 CAPSULE | Refills: 1 | COMMUNITY
Start: 2017-11-06 | End: 2017-12-13

## 2017-11-06 NOTE — PROGRESS NOTES
Subjective   Patient ID: Anthony Hernandez is a 77 y.o. male is here today for follow-up.  He is 4 months out from an open lumbar decompression at L3-L4 and L4-L5 and the left L4-L5 discectomy by Dr. Contreras 6/27/17. He still complains of left hip, thigh and leg pain.  He is currently taking Gabapentin 100 mg BID with no relief. He takes Cyclobenzaprine 5 mg HS for pain.     Leg Pain    The pain is present in the left leg, left hip and left thigh (left buttock ). The quality of the pain is described as aching. The pain is at a severity of 5/10. The pain is moderate. Exacerbated by: standing in one position  Treatments tried: walking  The treatment provided no relief.   Memory Loss   This is a recurrent problem. The current episode started more than 1 month ago. The problem has been gradually worsening. Pertinent negatives include no fever.       The following portions of the patient's history were reviewed and updated as appropriate: allergies, current medications, past family history, past medical history, past social history, past surgical history and problem list.    Review of Systems   Constitutional: Negative for fever.   Genitourinary: Negative for difficulty urinating.   Musculoskeletal:        Left buttock    Psychiatric/Behavioral: Positive for confusion and decreased concentration.   All other systems reviewed and are negative.      Objective   Physical Exam   Constitutional: He is oriented to person, place, and time. He appears well-developed and well-nourished.   HENT:   Head: Normocephalic and atraumatic.   Right Ear: External ear normal.   Left Ear: External ear normal.   Eyes: Conjunctivae and EOM are normal. Pupils are equal, round, and reactive to light. Right eye exhibits no discharge. Left eye exhibits no discharge.   Neck: Normal range of motion. Neck supple. No tracheal deviation present.   Pulmonary/Chest: Effort normal. No stridor. No respiratory distress.   Musculoskeletal: Normal range of  motion. He exhibits no edema, tenderness or deformity.   Neurological: He is alert and oriented to person, place, and time. He has normal strength and normal reflexes. He displays no atrophy, no tremor and normal reflexes. No cranial nerve deficit or sensory deficit. He exhibits normal muscle tone. He displays a negative Romberg sign. He displays no seizure activity. Coordination and gait normal.   No long tract signs   Skin: Skin is warm and dry.   Psychiatric: He has a normal mood and affect. His behavior is normal. Judgment and thought content normal.   Nursing note and vitals reviewed.    Neurologic Exam     Mental Status   Oriented to person, place, and time.     Cranial Nerves     CN III, IV, VI   Pupils are equal, round, and reactive to light.  Extraocular motions are normal.     Motor Exam     Strength   Strength 5/5 throughout.       Assessment/Plan   Independent Review of Radiographic Studies:      Medical Decision Making:    Mr. Hernandez is just 4 months out from an open lumbar decompression L3-L5.  Postoperatively he has continued to have leg pain and did have an MRI that showed potentially a recurrent left L4-L5 disc protrusion.  He is on gabapentin 100 mg twice a day but has not noticed much improvement.  He did go to physical therapy previously but stopped it due to the severity of the pain.  The patient and his wife do not think that his pain is improving.  He continues to have considerable back and posterior lateral leg pain in the left side.  He also has had recurrent episodes of confusion lately and his PCP did order a brain MRI and MRA of the head and neck.  The brain MRI was unremarkable for any evidence of acute abnormalities.  The MRA showed some chronic appearing moderate stenosis of the left vertebral artery, moderate stenosis of the right PCA and 50% right ICA stenosis.  He is on Aspirin 325 mg daily.    He did previously work with Dr. Chacko and had epidurals and other injections  preoperatively but nothing since surgery.    The patient and his wife would like to move forward with the myelogram to determine if additional surgery is indicated for the left leg pain.  They are aware of the risks of bleeding, infection and headache and do wish to proceed.  Our office will contact Dr. Darnell's office to make sure that he is okay with him stopping his aspirin given the recent episodes of severe confusion and generalized confusion.  I also discussed a referral to a medical neurologist with the patient and his wife.  He had severe confusion and encephalopathy postoperatively and his wife has noticed a more gradual progressive worsening in regards to cognitive function in the past year.  I think it would make sense to get their input now and we will begin that referral process.    He will f/u after the myelogram to see Dr. Contreras.   Anthony was seen today for leg pain.    Diagnoses and all orders for this visit:    DDD (degenerative disc disease), lumbar  -     IR myelogram lspine S&I included; Future  -     CT Lumbar Spine Without Contrast; Future  -     XR Spine Lumbar Complete With Flex & Ext; Future    Lumbar radiculopathy  -     IR myelogram lspine S&I included; Future  -     CT Lumbar Spine Without Contrast; Future  -     XR Spine Lumbar Complete With Flex & Ext; Future    Episodic confusion  -     Ambulatory Referral to Neurology    Return for follow up after radiology test with Dr. Contreras.

## 2017-11-14 ENCOUNTER — TELEPHONE (OUTPATIENT)
Dept: NEUROSURGERY | Facility: CLINIC | Age: 77
End: 2017-11-14

## 2017-11-14 NOTE — TELEPHONE ENCOUNTER
I spoke to Mrs. Hernandez she said since the increase of Gabapentin 200 mg BID, he has been more confused, off balance and hearing things. She also said his pain is not any better.

## 2017-11-14 NOTE — TELEPHONE ENCOUNTER
Patient's wife says that Madonna increased his Neurontin and he is not tolerating it well. Wife says that he's getting confused more and more, and his balance seems off. Also says that the increase hasn't helped his pain.     Please advise

## 2017-11-29 ENCOUNTER — TELEPHONE (OUTPATIENT)
Dept: NEUROSURGERY | Facility: CLINIC | Age: 77
End: 2017-11-29

## 2017-11-29 NOTE — TELEPHONE ENCOUNTER
Pt wife called stating the patient has been confused since starting on the Gabapentin even after they cut back his dosage. Wife states that his confusion has not gotten better but it hasn't gotten worse either. Wife states that patient is not remembering faces including her. Wife is really concerned and wants to know what to do. Wife states that she has taken the patient to his PCP and some CT scans were performed on head and neck. Wife states that their PCP advised her to call our office.

## 2017-11-30 NOTE — TELEPHONE ENCOUNTER
Spoke with the patient's wife and advised her to stop the Gabapentin. She states that the patient actually had MRI's done on 10/20/17. They are in his chart if you want to look at the results.

## 2017-11-30 NOTE — TELEPHONE ENCOUNTER
Madonna ordered a myelogram. I don't think he got it. I'm to see him in December. Tell her I need the myelogram to make final decision regarding whether he needs another surgery.

## 2017-11-30 NOTE — TELEPHONE ENCOUNTER
Spoke with patient's wife to let her know that the patient needs to have the myelogram in order to determine if the patient will need another surgery.

## 2017-12-05 RX ORDER — ATORVASTATIN CALCIUM 80 MG/1
TABLET, FILM COATED ORAL
Qty: 90 TABLET | Refills: 1 | Status: SHIPPED | OUTPATIENT
Start: 2017-12-05 | End: 2018-04-27 | Stop reason: SDUPTHER

## 2017-12-09 ENCOUNTER — HOSPITAL ENCOUNTER (EMERGENCY)
Facility: HOSPITAL | Age: 77
Discharge: HOME | End: 2017-12-09
Attending: EMERGENCY MEDICINE | Admitting: EMERGENCY MEDICINE

## 2017-12-09 VITALS
HEART RATE: 92 BPM | DIASTOLIC BLOOD PRESSURE: 91 MMHG | TEMPERATURE: 98.6 F | OXYGEN SATURATION: 100 % | WEIGHT: 200 LBS | BODY MASS INDEX: 27.09 KG/M2 | HEIGHT: 72 IN | SYSTOLIC BLOOD PRESSURE: 185 MMHG | RESPIRATION RATE: 16 BRPM

## 2017-12-09 DIAGNOSIS — F91.9 BEHAVIOR DISTURBANCE: Primary | ICD-10-CM

## 2017-12-09 LAB
ALBUMIN SERPL-MCNC: 4.2 G/DL (ref 3.5–5.2)
ALBUMIN/GLOB SERPL: 1.4 G/DL
ALP SERPL-CCNC: 116 U/L (ref 39–117)
ALT SERPL W P-5'-P-CCNC: 21 U/L (ref 1–41)
ANION GAP SERPL CALCULATED.3IONS-SCNC: 15.3 MMOL/L
AST SERPL-CCNC: 21 U/L (ref 1–40)
BACTERIA UR QL AUTO: NORMAL /HPF
BASOPHILS # BLD AUTO: 0.07 10*3/MM3 (ref 0–0.2)
BASOPHILS NFR BLD AUTO: 0.9 % (ref 0–1.5)
BILIRUB SERPL-MCNC: 0.7 MG/DL (ref 0.1–1.2)
BILIRUB UR QL STRIP: NEGATIVE
BUN BLD-MCNC: 16 MG/DL (ref 8–23)
BUN/CREAT SERPL: 13.8 (ref 7–25)
CALCIUM SPEC-SCNC: 9.4 MG/DL (ref 8.6–10.5)
CHLORIDE SERPL-SCNC: 100 MMOL/L (ref 98–107)
CLARITY UR: CLEAR
CO2 SERPL-SCNC: 24.7 MMOL/L (ref 22–29)
COLOR UR: YELLOW
CREAT BLD-MCNC: 1.16 MG/DL (ref 0.76–1.27)
DEPRECATED RDW RBC AUTO: 42.9 FL (ref 37–54)
EOSINOPHIL # BLD AUTO: 0.16 10*3/MM3 (ref 0–0.7)
EOSINOPHIL NFR BLD AUTO: 2.1 % (ref 0.3–6.2)
ERYTHROCYTE [DISTWIDTH] IN BLOOD BY AUTOMATED COUNT: 12.7 % (ref 11.5–14.5)
GFR SERPL CREATININE-BSD FRML MDRD: 61 ML/MIN/1.73
GLOBULIN UR ELPH-MCNC: 3 GM/DL
GLUCOSE BLD-MCNC: 145 MG/DL (ref 65–99)
GLUCOSE UR STRIP-MCNC: ABNORMAL MG/DL
HCT VFR BLD AUTO: 41.5 % (ref 40.4–52.2)
HGB BLD-MCNC: 13.6 G/DL (ref 13.7–17.6)
HGB UR QL STRIP.AUTO: NEGATIVE
HYALINE CASTS UR QL AUTO: NORMAL /LPF
IMM GRANULOCYTES # BLD: 0.02 10*3/MM3 (ref 0–0.03)
IMM GRANULOCYTES NFR BLD: 0.3 % (ref 0–0.5)
KETONES UR QL STRIP: NEGATIVE
LEUKOCYTE ESTERASE UR QL STRIP.AUTO: ABNORMAL
LYMPHOCYTES # BLD AUTO: 1.7 10*3/MM3 (ref 0.9–4.8)
LYMPHOCYTES NFR BLD AUTO: 22.1 % (ref 19.6–45.3)
MCH RBC QN AUTO: 30.6 PG (ref 27–32.7)
MCHC RBC AUTO-ENTMCNC: 32.8 G/DL (ref 32.6–36.4)
MCV RBC AUTO: 93.3 FL (ref 79.8–96.2)
MONOCYTES # BLD AUTO: 0.47 10*3/MM3 (ref 0.2–1.2)
MONOCYTES NFR BLD AUTO: 6.1 % (ref 5–12)
NEUTROPHILS # BLD AUTO: 5.28 10*3/MM3 (ref 1.9–8.1)
NEUTROPHILS NFR BLD AUTO: 68.5 % (ref 42.7–76)
NITRITE UR QL STRIP: NEGATIVE
PH UR STRIP.AUTO: 5.5 [PH] (ref 5–8)
PLATELET # BLD AUTO: 178 10*3/MM3 (ref 140–500)
PMV BLD AUTO: 11.1 FL (ref 6–12)
POTASSIUM BLD-SCNC: 4.2 MMOL/L (ref 3.5–5.2)
PROT SERPL-MCNC: 7.2 G/DL (ref 6–8.5)
PROT UR QL STRIP: NEGATIVE
RBC # BLD AUTO: 4.45 10*6/MM3 (ref 4.6–6)
RBC # UR: NORMAL /HPF
REF LAB TEST METHOD: NORMAL
SODIUM BLD-SCNC: 140 MMOL/L (ref 136–145)
SP GR UR STRIP: 1.02 (ref 1–1.03)
SQUAMOUS #/AREA URNS HPF: NORMAL /HPF
T4 FREE SERPL-MCNC: 1.48 NG/DL (ref 0.93–1.7)
TSH SERPL DL<=0.05 MIU/L-ACNC: 4.78 MIU/ML (ref 0.27–4.2)
UROBILINOGEN UR QL STRIP: ABNORMAL
WBC NRBC COR # BLD: 7.7 10*3/MM3 (ref 4.5–10.7)
WBC UR QL AUTO: NORMAL /HPF

## 2017-12-09 PROCEDURE — 90791 PSYCH DIAGNOSTIC EVALUATION: CPT | Performed by: SOCIAL WORKER

## 2017-12-09 PROCEDURE — 96360 HYDRATION IV INFUSION INIT: CPT

## 2017-12-09 PROCEDURE — 84443 ASSAY THYROID STIM HORMONE: CPT | Performed by: EMERGENCY MEDICINE

## 2017-12-09 PROCEDURE — 84439 ASSAY OF FREE THYROXINE: CPT | Performed by: EMERGENCY MEDICINE

## 2017-12-09 PROCEDURE — 85025 COMPLETE CBC W/AUTO DIFF WBC: CPT | Performed by: EMERGENCY MEDICINE

## 2017-12-09 PROCEDURE — 81001 URINALYSIS AUTO W/SCOPE: CPT | Performed by: EMERGENCY MEDICINE

## 2017-12-09 PROCEDURE — 99284 EMERGENCY DEPT VISIT MOD MDM: CPT

## 2017-12-09 PROCEDURE — 80053 COMPREHEN METABOLIC PANEL: CPT | Performed by: EMERGENCY MEDICINE

## 2017-12-09 RX ORDER — OLANZAPINE 5 MG/1
5 TABLET ORAL NIGHTLY
Qty: 10 TABLET | Refills: 0 | Status: SHIPPED | OUTPATIENT
Start: 2017-12-09 | End: 2018-06-14

## 2017-12-09 RX ADMIN — SODIUM CHLORIDE 1000 ML: 9 INJECTION, SOLUTION INTRAVENOUS at 11:34

## 2017-12-09 NOTE — CONSULTS
[facility] Assessment:  Pt is a 76 yo white  male.  He was brought to the ED by his wife and son after falling today.  They also report his confusion has worsened over the past 6 months since he had back surgery in .  Pt is A&O to self and place but could not give date.  Pt did know the president and he knew the year.  According to family the patient has been getting more and more confused over the past 6 months as well as experiencing hallucinations and delusions.  Recently he has been getting up in the middle of the night, he has been fearful there are people in the house trying to hurt his wife.  He sometimes gets ready 12 hours early for an appt.  He gets nervous when his wife is not around.  She reports he has not been driving for a long time but she takes him with her to go shopping to get him out of the house.  Pt has a CPAP machine that he takes off often in the middle of the night.  Pt used to be able to help with small chores around the house but now is unable to focus.      Pt has not drank any etoh for years.  He does not smoke and hasn't used illicit drugs.  He has been calm in his demeanor which his wife says is his normal.  Usually he is very positive but wife sees him now as sad and worried.  No SI/HI noted.  Pt has not been aggressive at all.  Pt has had no psychiatric treatment in the past.  Discussed the benefit of an evaluation by a psychiatrist and possible psych testing.  Family is agreeable.  Pt also has an appt with a new PCP on .  Wife will discuss what meds might be helpful with PCP at that appt until he can get into see a psychiatrist.     Pt was  once before but his wife  of cancer.  Pt and his current wife have been together 17 years but were only  this past 2017.  Wife and pt's son both seem very supportive and caring.    Discussed plan with ED MD who is agreeable and is willing to begin the pt on Zyprexa which might help the  current symptoms.  Anticipate d/c to home and follow up with PCP and a psychiatrist.  Referral information for psychiatrist given to family.

## 2017-12-09 NOTE — ED TRIAGE NOTES
Patient arrived with family from home. Patient's family report that the patient has had worsening confusion since June when he had back surgery. Patient has reported to have difficulty with his left leg since the surgery and family reports that he can only walk short distances. Family reports that he has been hearing things that aren't there and saying that he has gone places that he hasn't gone. This am the patient fell against a fish tank and he wife caught him and helped lower him to the floor. Patient and family denies loss of consciousness.

## 2017-12-09 NOTE — ED PROVIDER NOTES
EMERGENCY DEPARTMENT ENCOUNTER    CHIEF COMPLAINT  Chief Complaint: Increased confusion  History given by: Patient and family  History limited by: Nothing  Room Number: 10/10  PMD: Luis Darnell Jr., MD      HPI:  Pt is a 77 y.o. male who presents complaining of intermittent increased confusion since June, 2017 with symptoms worsening since that time. The pt states he tripped on the carpet and fell this morning. The pt hit his face on the corner of a fish tank. The pt denies any injury from the fall and denies any other recent falls. The pt states he is starting to have worsening intermittent forgetfulness. The pt is aware of his confusion, but he states it has been worsening since June, 2017. The pt's family states the pt believes someone is going to hurt his wife, so he becomes very overprotective. The pt's family states the pt will wander around the house at night. Pt reports chronic back pain. The pt's family states the pt has visual hallucinations and increased anxiety. The pt's family states the pt will see someone in the house, and he is afraid they will hurt them. The pt's family states the pt can be talked out of the hallucinations sometimes, but they are not always successful.    Duration: The pt has had chronic confusion since June, 2017, but it has worsening since that time  Onset: Gradual  Timing: Intermittent  Radiation: None  Quality: Confusion  Intensity/Severity: Moderate  Progression: Worsening  Associated Symptoms: Chronic back pain, visual hallucinations per family, and increased anxiety per family  Aggravating Factors: None stated  Alleviating Factors: None stated  Previous Episodes: None  Treatment before arrival: Nothing    PAST MEDICAL HISTORY  Active Ambulatory Problems     Diagnosis Date Noted   • Aortic heart valve narrowing 02/15/2016   • Asymptomatic carotid artery narrowing without infarction 02/15/2016   • Cor pulmonale 02/15/2016   • Functional murmur 02/15/2016   • BP (high  blood pressure) 02/15/2016   • Lumbar radiculopathy 02/15/2016   • CN (constipation) 02/15/2016   • DDD (degenerative disc disease), lumbar 02/15/2016   • Fatigue 02/15/2016   • HLD (hyperlipidemia) 02/15/2016   • Amnesia 02/15/2016   • ZAIN (obstructive sleep apnea) 02/15/2016   • Loud breathing during sleep 02/15/2016   • Diabetes mellitus type 2, noninsulin dependent 02/15/2016   • Multiple thyroid nodules 03/30/2017   • Difficulty walking 06/28/2017   • Hearing loss of both ears 09/14/2017   • Episodic confusion 11/06/2017     Resolved Ambulatory Problems     Diagnosis Date Noted   • Back ache 02/15/2016   • Bulge of lumbar disc without myelopathy 02/15/2016   • Leg pain 02/15/2016   • Ache in joint 02/15/2016   • LBP (low back pain) 02/15/2016   • Lumbar canal stenosis 02/15/2016   • Stenosis, spinal, lumbar 06/27/2017   • Surgery follow-up examination 07/11/2017     Past Medical History:   Diagnosis Date   • Acute myocardial infarction    • Anesthesia complication    • Aortic valve sclerosis    • Coronary artery disease    • Diabetes mellitus    • Disease of thyroid gland    • Hearing difficulty of both ears    • Heart attack    • Intermittent claudication    • Osteoarthritis    • Peripheral vascular disease    • Skin cancer    • Sleep apnea    • Spinal stenosis        PAST SURGICAL HISTORY  Past Surgical History:   Procedure Laterality Date   • ATHERECTOMY Left     BRANT FEMORAL-POPLITEAL INITIAL STENOSIS WITH ATHERECTOMY AND STENT LEFT   • COLONOSCOPY     • COLONOSCOPY W/ POLYPECTOMY      BENIGN   • CORONARY ANGIOPLASTY WITH STENT PLACEMENT     • CORONARY ARTERY BYPASS GRAFT  04/2010    cabg x 3: LIMA to LAD, vein graft to RCA and OM1   • LUMBAR LAMINECTOMY DISCECTOMY DECOMPRESSION Bilateral 6/27/2017    Procedure: L3/4, L4/5 OPEN LUMBAR DECOMPRESSION POSTERIOR 1-2 LEVELS;  Surgeon: Ranjeet Contreras MD;  Location: [facility];  Service:    • THROMBOENDARTERECTOMY Left     NEUROLOGICAL SURGERY CAROTID  ENDARTERECTOMY LEFT       FAMILY HISTORY  Family History   Problem Relation Age of Onset   • Breast cancer Mother    • Cancer Mother    • Brain cancer Sister    • Other Brother      CABG   • Stroke Brother    • Heart disease Brother    • Malig Hyperthermia Neg Hx        SOCIAL HISTORY  Social History     Social History   • Marital status:      Spouse name: N/A   • Number of children: N/A   • Years of education: N/A     Occupational History   • Not on file.     Social History Main Topics   • Smoking status: Former Smoker     Packs/day: 1.00     Years: 20.00     Quit date: 1985   • Smokeless tobacco: Not on file   • Alcohol use No      Comment: RARE 3-4 YR   • Drug use: No   • Sexual activity: Defer     Other Topics Concern   • Not on file     Social History Narrative   • No narrative on file       ALLERGIES  Ativan [lorazepam]; Benzodiazepines; and Diazepam    REVIEW OF SYSTEMS  Review of Systems   Constitutional: Negative for chills and fever.   HENT: Negative for sore throat and trouble swallowing.    Eyes: Negative for visual disturbance.   Respiratory: Negative for cough and shortness of breath.    Cardiovascular: Negative for chest pain and leg swelling.   Gastrointestinal: Negative for abdominal pain, diarrhea and vomiting.   Endocrine: Negative.    Genitourinary: Negative for decreased urine volume and frequency.   Musculoskeletal: Positive for back pain (Chronic). Negative for neck pain.   Skin: Negative for rash.   Allergic/Immunologic: Negative.    Neurological: Negative for weakness and numbness.   Hematological: Negative.    Psychiatric/Behavioral: Positive for confusion and hallucinations (Visual per family). The patient is nervous/anxious (Per family).    All other systems reviewed and are negative.      PHYSICAL EXAM  ED Triage Vitals   Temp Pulse Resp BP SpO2   -- -- -- -- --             Temp src Heart Rate Source Patient Position BP Location FiO2 (%)   -- -- -- -- --              Physical Exam    Constitutional: He is well-developed, well-nourished, and in no distress.   HENT:   Head: Normocephalic and atraumatic.   Eyes: EOM are normal. Pupils are equal, round, and reactive to light.   Neck: Normal range of motion. Neck supple.   Cardiovascular: Normal rate, regular rhythm and normal heart sounds.    Pulmonary/Chest: Effort normal and breath sounds normal. No respiratory distress.   Abdominal: Soft. There is no tenderness. There is no rebound and no guarding.   Musculoskeletal: Normal range of motion. He exhibits no edema.   Neurological: He is alert. He has normal sensation and normal strength. He is disoriented (Time).   Skin: Skin is warm and dry.   Psychiatric:   The pt has scattered thoughts.   Nursing note and vitals reviewed.      LAB RESULTS  Lab Results (last 24 hours)     Procedure Component Value Units Date/Time    CBC & Differential [[id number]] Collected:  12/09/17 0915    Specimen:  Blood Updated:  12/09/17 0930    Narrative:       The following orders were created for panel order CBC & Differential.  Procedure                               Abnormality         Status                     ---------                               -----------         ------                     CBC Auto Differential[[id number]]        Abnormal            Final result                 Please view results for these tests on the individual orders.    Comprehensive Metabolic Panel [[id number]]  (Abnormal) Collected:  12/09/17 0915    Specimen:  Blood Updated:  12/09/17 0956     Glucose 145 (H) mg/dL      BUN 16 mg/dL      Creatinine 1.16 mg/dL      Sodium 140 mmol/L      Potassium 4.2 mmol/L      Chloride 100 mmol/L      CO2 24.7 mmol/L      Calcium 9.4 mg/dL      Total Protein 7.2 g/dL      Albumin 4.20 g/dL      ALT (SGPT) 21 U/L      AST (SGOT) 21 U/L      Alkaline Phosphatase 116 U/L      Total Bilirubin 0.7 mg/dL      eGFR Non African Amer 61 mL/min/1.73      Globulin 3.0 gm/dL      A/G Ratio 1.4 g/dL       BUN/Creatinine Ratio 13.8     Anion Gap 15.3 mmol/L     Narrative:       The MDRD GFR formula is only valid for adults with stable renal function between ages 18 and 70.    CBC Auto Differential [[id number]]  (Abnormal) Collected:  12/09/17 0915    Specimen:  Blood Updated:  12/09/17 0930     WBC 7.70 10*3/mm3      RBC 4.45 (L) 10*6/mm3      Hemoglobin 13.6 (L) g/dL      Hematocrit 41.5 %      MCV 93.3 fL      MCH 30.6 pg      MCHC 32.8 g/dL      RDW 12.7 %      RDW-SD 42.9 fl      MPV 11.1 fL      Platelets 178 10*3/mm3      Neutrophil % 68.5 %      Lymphocyte % 22.1 %      Monocyte % 6.1 %      Eosinophil % 2.1 %      Basophil % 0.9 %      Immature Grans % 0.3 %      Neutrophils, Absolute 5.28 10*3/mm3      Lymphocytes, Absolute 1.70 10*3/mm3      Monocytes, Absolute 0.47 10*3/mm3      Eosinophils, Absolute 0.16 10*3/mm3      Basophils, Absolute 0.07 10*3/mm3      Immature Grans, Absolute 0.02 10*3/mm3     TSH [[id number]]  (Abnormal) Collected:  12/09/17 0915    Specimen:  Blood Updated:  12/09/17 1023     TSH 4.780 (H) mIU/mL     T4, Free [[id number]]  (Normal) Collected:  12/09/17 0915    Specimen:  Blood Updated:  12/09/17 1023     Free T4 1.48 ng/dL           I ordered the above labs and reviewed the results      PROCEDURES  Procedures      PROGRESS AND CONSULTS  ED Course     0903  Labs ordered for further evaluation.    0932  Labs ordered for further evaluation.    0947  Consult placed to [facility].    1136  The pt has been evaluated by [facility]. They believe the pt can be discharged with a new prescription for Zyprexa. [facility] will provide the pt with a neuropsychiatrist to f/u with for further treatment.    MEDICAL DECISION MAKING  Results were reviewed/discussed with the patient and they were also made aware of online access. Pt also made aware that some labs, such as cultures, will not be resulted during ER visit and follow up with PMD is necessary.     MDM  Number of Diagnoses or Management Options  Behavior  disturbance with emergent dementia:      Amount and/or Complexity of Data Reviewed  Clinical lab tests: reviewed and ordered (TSH - 4.780)  Review and summarize past medical records: yes (The pt saw Dr. Darnell for similar symptoms. The pt had a MRI and MRA of the head and neck in late October that was relatively unremarkable.)    Patient Progress  Patient progress: stable         DIAGNOSIS  Final diagnoses:   Behavior disturbance with emergent dementia       DISPOSITION  DISCHARGE    Patient discharged in stable condition.    Reviewed implications of results, diagnosis, meds, responsibility to follow up, warning signs and symptoms of possible worsening, potential complications and reasons to return to ER.    Patient/Family voiced understanding of above instructions.    Discussed plan for discharge, as there is no emergent indication for admission.  Pt/family is agreeable and understands need for follow up and repeat testing.  Pt is aware that discharge does not mean that nothing is wrong but it indicates no emergency is present that requires admission and they must continue care with follow-up as given below or physician of their choice.     FOLLOW-UP  Luis Darnell Jr., MD  [address] [address]  [phone]    In 3 days  as scheduled         Medication List      New Prescriptions          OLANZapine 5 MG tablet   Commonly known as:  zyPREXA   Take 1 tablet by mouth Every Night.         Changed          levothyroxine 75 MCG tablet   Commonly known as:  SYNTHROID   Take 1 tablet by mouth Daily.   What changed:  how much to take         Stop          gabapentin 100 MG capsule   Commonly known as:  NEURONTIN               Latest Documented Vital Signs:  As of 11:50 AM  BP- (!) 192/106 HR- 75 Temp- 98.6 °F (37 °C) (Oral) O2 sat- 100%    --  Documentation assistance provided by rosa Gonzales for Dr. Grissom.  Information recorded by the rosa was done at my direction and has been  verified and validated by me.       Booker Gonzales  12/09/17 1156       Rupert Grissom MD  12/09/17 [id number]

## 2017-12-13 ENCOUNTER — HOSPITAL ENCOUNTER (OUTPATIENT)
Dept: GENERAL RADIOLOGY | Facility: HOSPITAL | Age: 77
Discharge: HOME | End: 2017-12-13

## 2017-12-13 ENCOUNTER — HOSPITAL ENCOUNTER (OUTPATIENT)
Dept: CT IMAGING | Facility: HOSPITAL | Age: 77
Discharge: HOME | End: 2017-12-13
Admitting: PHYSICIAN ASSISTANT

## 2017-12-13 VITALS
OXYGEN SATURATION: 99 % | SYSTOLIC BLOOD PRESSURE: 174 MMHG | DIASTOLIC BLOOD PRESSURE: 88 MMHG | HEIGHT: 72 IN | TEMPERATURE: 97 F | WEIGHT: 198 LBS | BODY MASS INDEX: 26.82 KG/M2 | HEART RATE: 84 BPM | RESPIRATION RATE: 18 BRPM

## 2017-12-13 DIAGNOSIS — M54.16 LUMBAR RADICULOPATHY: ICD-10-CM

## 2017-12-13 DIAGNOSIS — M51.36 DDD (DEGENERATIVE DISC DISEASE), LUMBAR: ICD-10-CM

## 2017-12-13 PROCEDURE — 72114 X-RAY EXAM L-S SPINE BENDING: CPT

## 2017-12-13 PROCEDURE — 72131 CT LUMBAR SPINE W/O DYE: CPT

## 2017-12-13 PROCEDURE — 72240 MYELOGRAPHY NECK SPINE: CPT

## 2017-12-13 PROCEDURE — 0 IOPAMIDOL 41 % SOLUTION: Performed by: RADIOLOGY

## 2017-12-13 RX ORDER — LIDOCAINE HYDROCHLORIDE 10 MG/ML
10 INJECTION, SOLUTION INFILTRATION; PERINEURAL ONCE
Status: COMPLETED | OUTPATIENT
Start: 2017-12-13 | End: 2017-12-13

## 2017-12-13 RX ADMIN — IOPAMIDOL 20 ML: 408 INJECTION, SOLUTION INTRATHECAL at 08:30

## 2017-12-13 RX ADMIN — LIDOCAINE HYDROCHLORIDE 3 ML: 10 INJECTION, SOLUTION INFILTRATION; PERINEURAL at 08:28

## 2017-12-13 NOTE — DISCHARGE INSTRUCTIONS

## 2017-12-13 NOTE — NURSING NOTE
Verbal and written D/C instructions given tot patient and wife.  They voice understanding and are able to teach back D/C instructions.

## 2017-12-14 ENCOUNTER — TELEPHONE (OUTPATIENT)
Dept: INTERVENTIONAL RADIOLOGY/VASCULAR | Facility: HOSPITAL | Age: 77
End: 2017-12-14

## 2017-12-15 ENCOUNTER — OFFICE VISIT (OUTPATIENT)
Dept: INTERNAL MEDICINE | Facility: CLINIC | Age: 77
End: 2017-12-15

## 2017-12-15 VITALS
OXYGEN SATURATION: 98 % | HEIGHT: 72 IN | WEIGHT: 189.8 LBS | DIASTOLIC BLOOD PRESSURE: 68 MMHG | SYSTOLIC BLOOD PRESSURE: 118 MMHG | HEART RATE: 80 BPM | BODY MASS INDEX: 25.71 KG/M2

## 2017-12-15 DIAGNOSIS — R41.82 ALTERED MENTAL STATUS, UNSPECIFIED ALTERED MENTAL STATUS TYPE: Primary | ICD-10-CM

## 2017-12-15 PROCEDURE — 99214 OFFICE O/P EST MOD 30 MIN: CPT | Performed by: FAMILY MEDICINE

## 2017-12-15 RX ORDER — LEVOTHYROXINE SODIUM 0.05 MG/1
50 TABLET ORAL DAILY
COMMUNITY
End: 2018-06-22 | Stop reason: DRUGHIGH

## 2017-12-15 NOTE — PROGRESS NOTES
Subjective   Anthony Hernandez is a 77 y.o. male.     Chief Complaint   Patient presents with   • New Patient Visit     transfer Dr. Darnell   • Hypertension   • Hyperlipidemia   • Altered Mental Status         History of Present Illness     Patient presents today as a new patient.  Patient states that he's been feeling confused for last 6 months.  Patient's wife noted that the confusion is progressively getting worse every day.  It appears the patient had surgery done a few months back.  He noted that his confusion started after that from the anesthesia.  Patient states that he is getting more confused daily, and his orientation seems to be not intact.  Patient recently went to the emergency room last week.  He was told to follow up with neuropsych.  Patient's wife states that that appointment will have until April, and she is concerned that he is progressively getting worse every day.  She notes that patient had imaging done but was few months ago.  She states that he has presented like this to their prior PCP and even the emergency room.    The following portions of the patient's history were reviewed and updated as appropriate: allergies, current medications, past family history, past medical history, past social history, past surgical history and problem list.    Review of Systems   Constitutional: Negative for chills and fever.   HENT: Negative for congestion, rhinorrhea, sinus pain and sore throat.    Eyes: Negative for photophobia and visual disturbance.   Respiratory: Negative for cough, chest tightness and shortness of breath.    Cardiovascular: Negative for chest pain and palpitations.   Gastrointestinal: Negative for diarrhea, nausea and vomiting.   Genitourinary: Negative for dysuria, frequency and urgency.   Skin: Negative for rash and wound.   Psychiatric/Behavioral: Positive for agitation and confusion. Negative for behavioral problems, hallucinations and suicidal ideas.       Objective   Physical Exam    Constitutional: He appears well-developed and well-nourished.   HENT:   Head: Normocephalic and atraumatic.   Right Ear: External ear normal.   Left Ear: External ear normal.   Mouth/Throat: Oropharynx is clear and moist.   Eyes: EOM are normal.   Neck: Normal range of motion. Neck supple.   Cardiovascular: Normal rate, regular rhythm and normal heart sounds.    Pulmonary/Chest: Effort normal and breath sounds normal. No respiratory distress.   Musculoskeletal: Normal range of motion.   Lymphadenopathy:     He has no cervical adenopathy.   Neurological: He is alert. No cranial nerve deficit. Coordination normal.   Alert and awake.  Not oriented.   Skin: Skin is warm.   Nursing note and vitals reviewed.      Assessment/Plan   Anthony was seen today for new patient visit, hypertension, hyperlipidemia and altered mental status.    Diagnoses and all orders for this visit:    Altered mental status, unspecified altered mental status type  -     CT Head Without Contrast; Future  -     Thyroid Panel With TSH  -     Methylmalonic Acid, Serum  -     Vitamin B12  -     Comprehensive Metabolic Panel  -     CBC & Differential  -     Ambulatory Referral to Neuropsychology  -     Is unclear what be causing patient's altered mental status.  At today's office visit it appears that patient was started on different muscle relaxants for his back.  They have recently been put on Flexeril.  I discussed the patient to stop the Flexeril.  He was recently put on Zyprexa from the emergency room.  I discussed with the patient and family to continue the Zyprexa.  I have discussed that patient should have evaluation done by neuropsychology for further evaluation.  At today's office visit we will obtain several labs for any causes of his altered mental status.  It is unclear at this time if it's dementia versus underlying delirium.  Patient and family understand and agree with plan.          No Follow-up on file.    Much of this encounter note is  an electronic transcription/translation of spoken language to printed text. The electronic translation of spoken language may permit erroneous, or at times, nonsensical words or phrases to be inadvertently transcribed; although I have reviewed the note for such errors, some may still exist.

## 2017-12-20 ENCOUNTER — OFFICE VISIT (OUTPATIENT)
Dept: NEUROSURGERY | Facility: CLINIC | Age: 77
End: 2017-12-20

## 2017-12-20 VITALS
SYSTOLIC BLOOD PRESSURE: 122 MMHG | WEIGHT: 189 LBS | DIASTOLIC BLOOD PRESSURE: 70 MMHG | HEART RATE: 80 BPM | BODY MASS INDEX: 25.6 KG/M2 | HEIGHT: 72 IN

## 2017-12-20 DIAGNOSIS — R41.0 EPISODIC CONFUSION: ICD-10-CM

## 2017-12-20 DIAGNOSIS — M51.36 DDD (DEGENERATIVE DISC DISEASE), LUMBAR: Primary | ICD-10-CM

## 2017-12-20 DIAGNOSIS — M54.16 LUMBAR RADICULOPATHY: ICD-10-CM

## 2017-12-20 LAB
ALBUMIN SERPL-MCNC: 4.4 G/DL (ref 3.5–5.2)
ALBUMIN/GLOB SERPL: 1.8 G/DL
ALP SERPL-CCNC: 115 U/L (ref 39–117)
ALT SERPL-CCNC: 20 U/L (ref 1–41)
AST SERPL-CCNC: 18 U/L (ref 1–40)
BASOPHILS # BLD AUTO: 0.05 10*3/MM3 (ref 0–0.2)
BASOPHILS NFR BLD AUTO: 0.7 % (ref 0–1.5)
BILIRUB SERPL-MCNC: 0.6 MG/DL (ref 0.1–1.2)
BUN SERPL-MCNC: 19 MG/DL (ref 8–23)
BUN/CREAT SERPL: 16.7 (ref 7–25)
CALCIUM SERPL-MCNC: 9.8 MG/DL (ref 8.6–10.5)
CHLORIDE SERPL-SCNC: 104 MMOL/L (ref 98–107)
CO2 SERPL-SCNC: 27.7 MMOL/L (ref 22–29)
CREAT SERPL-MCNC: 1.14 MG/DL (ref 0.76–1.27)
EOSINOPHIL # BLD AUTO: 0.14 10*3/MM3 (ref 0–0.7)
EOSINOPHIL NFR BLD AUTO: 2 % (ref 0.3–6.2)
ERYTHROCYTE [DISTWIDTH] IN BLOOD BY AUTOMATED COUNT: 13.1 % (ref 11.5–14.5)
FT4I SERPL CALC-MCNC: 1.8 (ref 1.2–4.9)
GLOBULIN SER CALC-MCNC: 2.5 GM/DL
GLUCOSE SERPL-MCNC: 138 MG/DL (ref 65–99)
HCT VFR BLD AUTO: 42.5 % (ref 40.4–52.2)
HGB BLD-MCNC: 13.7 G/DL (ref 13.7–17.6)
IMM GRANULOCYTES # BLD: 0 10*3/MM3 (ref 0–0.03)
IMM GRANULOCYTES NFR BLD: 0 % (ref 0–0.5)
LYMPHOCYTES # BLD AUTO: 1.77 10*3/MM3 (ref 0.9–4.8)
LYMPHOCYTES NFR BLD AUTO: 25.7 % (ref 19.6–45.3)
MCH RBC QN AUTO: 30.8 PG (ref 27–32.7)
MCHC RBC AUTO-ENTMCNC: 32.2 G/DL (ref 32.6–36.4)
MCV RBC AUTO: 95.5 FL (ref 79.8–96.2)
METHYLMALONATE SERPL-SCNC: 213 NMOL/L (ref 0–378)
MONOCYTES # BLD AUTO: 0.49 10*3/MM3 (ref 0.2–1.2)
MONOCYTES NFR BLD AUTO: 7.1 % (ref 5–12)
NEUTROPHILS # BLD AUTO: 4.45 10*3/MM3 (ref 1.9–8.1)
NEUTROPHILS NFR BLD AUTO: 64.5 % (ref 42.7–76)
PLATELET # BLD AUTO: 198 10*3/MM3 (ref 140–500)
POTASSIUM SERPL-SCNC: 4.4 MMOL/L (ref 3.5–5.2)
PROT SERPL-MCNC: 6.9 G/DL (ref 6–8.5)
RBC # BLD AUTO: 4.45 10*6/MM3 (ref 4.6–6)
SODIUM SERPL-SCNC: 145 MMOL/L (ref 136–145)
T3RU NFR SERPL: 27 % (ref 24–39)
T4 SERPL-MCNC: 6.7 UG/DL (ref 4.5–12)
TSH SERPL DL<=0.005 MIU/L-ACNC: 2.48 UIU/ML (ref 0.45–4.5)
VIT B12 SERPL-MCNC: 322 PG/ML (ref 211–946)
WBC # BLD AUTO: 6.9 10*3/MM3 (ref 4.5–10.7)

## 2017-12-20 PROCEDURE — 99213 OFFICE O/P EST LOW 20 MIN: CPT | Performed by: NEUROLOGICAL SURGERY

## 2017-12-20 NOTE — PROGRESS NOTES
Subjective   Patient ID: Anthony Hernandez is a 77 y.o. male is here today for follow-up on leg pain and memory loss. Mr Hernandez had a L3-L4 and L4-L5 and the left L4-L5 discectomy by Dr. Contreras 6/27/17.      At the patient's last visit he complained of left hip, thigh and leg pain.     Today the patient is here with a new lumbar spine myelogram, he denies any complications from the myelogram. He reports that there have been no changes in his symptoms since last visit.    Leg Pain    The incident occurred more than 1 week ago. The pain is present in the left hip, left thigh and left leg (left buttock). The quality of the pain is described as aching. The pain has been constant since onset. Exacerbated by: standing and walking.       The following portions of the patient's history were reviewed and updated as appropriate: allergies, current medications, past family history, past medical history, past social history, past surgical history and problem list.    Review of Systems   All other systems reviewed and are negative.    He is with his wife.  It has been about 6 months since an open L3-L4 and L4-L5 decompression and a left-sided  L4-L5 diskectomy.  He went on to have his myelogram which we discussed.  I do not see any convincing evidence that there is any significant nerve root compression.  Again the residual pain is in his left buttock.  It does not really go down the leg but there have been a lot of other issues going on ranging from more and more confusion and the concern that he has dementia. He is on Zyprexa and he is working with a neuropsychologist.  He is supposed to see the neurologist in April.  I do not think surgery would be a very good idea given the many issues going on.  He could not tolerate gabapentin at 200 mg b.i.d. and 100 mg b.i.d. did not help.  I suggested that we split the difference and take 100 in the morning and 200 at night.  We will do that and stay away from surgery and reassess in 3  months.           Objective   Physical Exam   Constitutional: He is oriented to person, place, and time. He appears well-developed and well-nourished.   HENT:   Head: Normocephalic and atraumatic.   Eyes: Conjunctivae and EOM are normal. Pupils are equal, round, and reactive to light.   Fundoscopic exam:       The right eye shows no papilledema. The right eye shows venous pulsations.        The left eye shows no papilledema. The left eye shows venous pulsations.   Neck: Carotid bruit is not present.   Neurological: He is oriented to person, place, and time. He has a normal Finger-Nose-Finger Test and a normal Heel to Shin Test. Gait normal.   Reflex Scores:       Tricep reflexes are 2+ on the right side and 2+ on the left side.       Bicep reflexes are 2+ on the right side and 2+ on the left side.       Brachioradialis reflexes are 2+ on the right side and 2+ on the left side.       Patellar reflexes are 2+ on the right side and 2+ on the left side.       Achilles reflexes are 2+ on the right side and 2+ on the left side.  Psychiatric: His speech is normal.     Neurologic Exam     Mental Status   Oriented to person, place, and time.   Registration of memory: Good recent and remote memory.   Attention: normal. Concentration: normal.   Speech: speech is normal   Level of consciousness: alert  Knowledge: consistent with education.     Cranial Nerves     CN II   Visual fields full to confrontation.   Visual acuity: normal    CN III, IV, VI   Pupils are equal, round, and reactive to light.  Extraocular motions are normal.     CN V   Facial sensation intact.   Right corneal reflex: normal  Left corneal reflex: normal    CN VII   Facial expression full, symmetric.   Right facial weakness: none  Left facial weakness: none    CN VIII   Hearing: intact    CN IX, X   Palate: symmetric    CN XI   Right sternocleidomastoid strength: normal  Left sternocleidomastoid strength: normal    CN XII   Tongue: not atrophic  Tongue  deviation: none    Motor Exam   Muscle bulk: normal  Right arm tone: normal  Left arm tone: normal  Right leg tone: normal  Left leg tone: normal    Strength   Strength 5/5 except as noted.     Sensory Exam   Light touch normal.     Gait, Coordination, and Reflexes     Gait  Gait: normal    Coordination   Finger to nose coordination: normal  Heel to shin coordination: normal    Reflexes   Right brachioradialis: 2+  Left brachioradialis: 2+  Right biceps: 2+  Left biceps: 2+  Right triceps: 2+  Left triceps: 2+  Right patellar: 2+  Left patellar: 2+  Right achilles: 2+  Left achilles: 2+  Right : 2+  Left : 2+      Assessment/Plan   Independent Review of Radiographic Studies:    I have reviewed his myelogram done recently which showed a decompression at L3-L4 and L4-L5.  I do not see any convincing evidence of a recurrent disk herniation at L4-L5 on the left. I agree with the report.       Medical Decision Making:    I do not think any additional surgery would be helpful at this time.  Not only do I think it might not help the left buttock pain but there are a lot of other issues going on.  He will continue working with the neuropsychologist and see the neurologist in a few months.  I have asked him to take his gabapentin at 100 mg in the morning and 200 mg at night and to call us in a month to let us know how he is doing.  We will reassess in 3 months.        Anthony was seen today for leg pain.    Diagnoses and all orders for this visit:    DDD (degenerative disc disease), lumbar    Lumbar radiculopathy    Episodic confusion    Return in about 3 months (around 3/20/2018).

## 2017-12-21 NOTE — PROGRESS NOTES
The labs were reviewed. Please inform patient that labs were normal.  Thyroid panel is within normal limits.  Methylmalonic acid and vitamin B12 currently normal.  CMP is normal.  CBC is within normal limits.

## 2017-12-22 ENCOUNTER — TELEPHONE (OUTPATIENT)
Dept: INTERNAL MEDICINE | Facility: CLINIC | Age: 77
End: 2017-12-22

## 2017-12-22 ENCOUNTER — HOSPITAL ENCOUNTER (OUTPATIENT)
Dept: CT IMAGING | Facility: HOSPITAL | Age: 77
Discharge: HOME | End: 2017-12-22
Admitting: FAMILY MEDICINE

## 2017-12-22 DIAGNOSIS — R41.82 ALTERED MENTAL STATUS, UNSPECIFIED ALTERED MENTAL STATUS TYPE: ICD-10-CM

## 2017-12-22 PROCEDURE — 70450 CT HEAD/BRAIN W/O DYE: CPT

## 2017-12-22 NOTE — TELEPHONE ENCOUNTER
----- Message from Chris Dahl MD sent at 12/21/2017  3:12 PM EST -----  The labs were reviewed. Please inform patient that labs were normal.  Thyroid panel is within normal limits.  Methylmalonic acid and vitamin B12 currently normal.  CMP is normal.  CBC is within normal limits.

## 2017-12-22 NOTE — TELEPHONE ENCOUNTER
Patient's wife notified (okay per HIPAA) and voiced understanding. Patient's wife advised to have patient contact office if they have any questions.

## 2017-12-27 ENCOUNTER — TELEPHONE (OUTPATIENT)
Dept: INTERNAL MEDICINE | Facility: CLINIC | Age: 77
End: 2017-12-27

## 2017-12-27 DIAGNOSIS — R41.82 ALTERED MENTAL STATUS, UNSPECIFIED ALTERED MENTAL STATUS TYPE: Primary | ICD-10-CM

## 2017-12-27 NOTE — TELEPHONE ENCOUNTER
----- Message from Chris Dahl MD sent at 12/27/2017  8:39 AM EST -----  Please inform the patient of the following abnormal results.  Shows age related atrophy. They have recommended MRI. But will refer to neuropsych and neurology and will let them order MRI.

## 2017-12-27 NOTE — TELEPHONE ENCOUNTER
Patient's wife notified.  She stated that patient has an appointment with Olivia Jack, Ph.D. on 12/28/17.  Referral put in EPIC for patient to see neurology.

## 2017-12-27 NOTE — PROGRESS NOTES
Please inform the patient of the following abnormal results.  Shows age related atrophy. They have recommended MRI. But will refer to neuropsych and neurology and will let them order MRI.

## 2018-01-02 ENCOUNTER — TELEPHONE (OUTPATIENT)
Dept: NEUROSURGERY | Facility: CLINIC | Age: 78
End: 2018-01-02

## 2018-01-02 NOTE — TELEPHONE ENCOUNTER
Spoke with Sandra (patients wife) and advised her per Madonna Anthony to stop the Gabapentin all the way to see if that helps. Sandra was told if the symptoms do not resolve to contact her 's PCP and inform them. Sandra states understanding.

## 2018-01-02 NOTE — TELEPHONE ENCOUNTER
Pt's wife called this am to say that he is having trouble with his Gabapentin 100mg in morning and 200mg at night. It was reduced on 12/20/17 from 200 mg am and 200 mg pm.    She says that he is hallucinating. This started about 12/22/17. He is hearing, seeing things and was very fearful. She decrease his pm does to 100 mg and she states that this seems to have helped but not stopped completely. What do you suggest for her to do now? She can be reached at the number listed above.

## 2018-01-03 ENCOUNTER — TELEPHONE (OUTPATIENT)
Dept: NEUROSURGERY | Facility: CLINIC | Age: 78
End: 2018-01-03

## 2018-01-03 NOTE — TELEPHONE ENCOUNTER
Pt's wife called to say that Gabapentin isn't working for him. It was causing hallucinations and as of yesterday he is no longer taking it.    His pain is unbearable when walking. He is better while sitting. What do we suggest?

## 2018-01-04 ENCOUNTER — APPOINTMENT (OUTPATIENT)
Dept: GENERAL RADIOLOGY | Facility: HOSPITAL | Age: 78
End: 2018-01-04

## 2018-01-04 ENCOUNTER — HOSPITAL ENCOUNTER (EMERGENCY)
Facility: HOSPITAL | Age: 78
Discharge: HOME | End: 2018-01-04
Attending: EMERGENCY MEDICINE | Admitting: EMERGENCY MEDICINE

## 2018-01-04 ENCOUNTER — TELEPHONE (OUTPATIENT)
Dept: INTERNAL MEDICINE | Facility: CLINIC | Age: 78
End: 2018-01-04

## 2018-01-04 VITALS
HEIGHT: 72 IN | WEIGHT: 185 LBS | RESPIRATION RATE: 16 BRPM | BODY MASS INDEX: 25.06 KG/M2 | OXYGEN SATURATION: 97 % | TEMPERATURE: 98.1 F | HEART RATE: 76 BPM | DIASTOLIC BLOOD PRESSURE: 80 MMHG | SYSTOLIC BLOOD PRESSURE: 163 MMHG

## 2018-01-04 DIAGNOSIS — F03.91 DEMENTIA WITH BEHAVIORAL DISTURBANCE, UNSPECIFIED DEMENTIA TYPE: Primary | ICD-10-CM

## 2018-01-04 LAB
ALBUMIN SERPL-MCNC: 4 G/DL (ref 3.5–5.2)
ALBUMIN/GLOB SERPL: 1.3 G/DL
ALP SERPL-CCNC: 106 U/L (ref 39–117)
ALT SERPL W P-5'-P-CCNC: 20 U/L (ref 1–41)
ANION GAP SERPL CALCULATED.3IONS-SCNC: 10.3 MMOL/L
AST SERPL-CCNC: 19 U/L (ref 1–40)
BASOPHILS # BLD AUTO: 0.05 10*3/MM3 (ref 0–0.2)
BASOPHILS NFR BLD AUTO: 0.5 % (ref 0–1.5)
BILIRUB SERPL-MCNC: 0.5 MG/DL (ref 0.1–1.2)
BILIRUB UR QL STRIP: NEGATIVE
BUN BLD-MCNC: 14 MG/DL (ref 8–23)
BUN/CREAT SERPL: 12.2 (ref 7–25)
CALCIUM SPEC-SCNC: 9.3 MG/DL (ref 8.6–10.5)
CHLORIDE SERPL-SCNC: 101 MMOL/L (ref 98–107)
CLARITY UR: CLEAR
CO2 SERPL-SCNC: 27.7 MMOL/L (ref 22–29)
COLOR UR: ABNORMAL
CREAT BLD-MCNC: 1.15 MG/DL (ref 0.76–1.27)
DEPRECATED RDW RBC AUTO: 44.6 FL (ref 37–54)
EOSINOPHIL # BLD AUTO: 0.13 10*3/MM3 (ref 0–0.7)
EOSINOPHIL NFR BLD AUTO: 1.4 % (ref 0.3–6.2)
ERYTHROCYTE [DISTWIDTH] IN BLOOD BY AUTOMATED COUNT: 13 % (ref 11.5–14.5)
GFR SERPL CREATININE-BSD FRML MDRD: 62 ML/MIN/1.73
GLOBULIN UR ELPH-MCNC: 3 GM/DL
GLUCOSE BLD-MCNC: 175 MG/DL (ref 65–99)
GLUCOSE BLDC GLUCOMTR-MCNC: 96 MG/DL (ref 70–130)
GLUCOSE UR STRIP-MCNC: ABNORMAL MG/DL
HCT VFR BLD AUTO: 41.6 % (ref 40.4–52.2)
HGB BLD-MCNC: 13.7 G/DL (ref 13.7–17.6)
HGB UR QL STRIP.AUTO: NEGATIVE
HOLD SPECIMEN: NORMAL
HOLD SPECIMEN: NORMAL
IMM GRANULOCYTES # BLD: 0.02 10*3/MM3 (ref 0–0.03)
IMM GRANULOCYTES NFR BLD: 0.2 % (ref 0–0.5)
KETONES UR QL STRIP: NEGATIVE
LEUKOCYTE ESTERASE UR QL STRIP.AUTO: NEGATIVE
LYMPHOCYTES # BLD AUTO: 1.28 10*3/MM3 (ref 0.9–4.8)
LYMPHOCYTES NFR BLD AUTO: 14.1 % (ref 19.6–45.3)
MAGNESIUM SERPL-MCNC: 2 MG/DL (ref 1.6–2.4)
MCH RBC QN AUTO: 31.4 PG (ref 27–32.7)
MCHC RBC AUTO-ENTMCNC: 32.9 G/DL (ref 32.6–36.4)
MCV RBC AUTO: 95.2 FL (ref 79.8–96.2)
MONOCYTES # BLD AUTO: 0.6 10*3/MM3 (ref 0.2–1.2)
MONOCYTES NFR BLD AUTO: 6.6 % (ref 5–12)
NEUTROPHILS # BLD AUTO: 7.02 10*3/MM3 (ref 1.9–8.1)
NEUTROPHILS NFR BLD AUTO: 77.2 % (ref 42.7–76)
NITRITE UR QL STRIP: NEGATIVE
PH UR STRIP.AUTO: <=5 [PH] (ref 5–8)
PLATELET # BLD AUTO: 183 10*3/MM3 (ref 140–500)
PMV BLD AUTO: 10.8 FL (ref 6–12)
POTASSIUM BLD-SCNC: 4.1 MMOL/L (ref 3.5–5.2)
PROT SERPL-MCNC: 7 G/DL (ref 6–8.5)
PROT UR QL STRIP: ABNORMAL
RBC # BLD AUTO: 4.37 10*6/MM3 (ref 4.6–6)
SODIUM BLD-SCNC: 139 MMOL/L (ref 136–145)
SP GR UR STRIP: 1.02 (ref 1–1.03)
TROPONIN T SERPL-MCNC: <0.01 NG/ML (ref 0–0.03)
UROBILINOGEN UR QL STRIP: ABNORMAL
WBC NRBC COR # BLD: 9.1 10*3/MM3 (ref 4.5–10.7)
WHOLE BLOOD HOLD SPECIMEN: NORMAL
WHOLE BLOOD HOLD SPECIMEN: NORMAL

## 2018-01-04 PROCEDURE — 85025 COMPLETE CBC W/AUTO DIFF WBC: CPT | Performed by: EMERGENCY MEDICINE

## 2018-01-04 PROCEDURE — 84484 ASSAY OF TROPONIN QUANT: CPT | Performed by: EMERGENCY MEDICINE

## 2018-01-04 PROCEDURE — 82962 GLUCOSE BLOOD TEST: CPT

## 2018-01-04 PROCEDURE — 80053 COMPREHEN METABOLIC PANEL: CPT | Performed by: EMERGENCY MEDICINE

## 2018-01-04 PROCEDURE — 99284 EMERGENCY DEPT VISIT MOD MDM: CPT

## 2018-01-04 PROCEDURE — 90791 PSYCH DIAGNOSTIC EVALUATION: CPT

## 2018-01-04 PROCEDURE — 83735 ASSAY OF MAGNESIUM: CPT | Performed by: EMERGENCY MEDICINE

## 2018-01-04 PROCEDURE — 71046 X-RAY EXAM CHEST 2 VIEWS: CPT

## 2018-01-04 PROCEDURE — 81003 URINALYSIS AUTO W/O SCOPE: CPT | Performed by: EMERGENCY MEDICINE

## 2018-01-04 RX ORDER — SODIUM CHLORIDE 0.9 % (FLUSH) 0.9 %
10 SYRINGE (ML) INJECTION AS NEEDED
Status: DISCONTINUED | OUTPATIENT
Start: 2018-01-04 | End: 2018-01-05 | Stop reason: HOSPADM

## 2018-01-04 RX ORDER — METHYLPREDNISOLONE 4 MG/1
TABLET ORAL
Qty: 21 TABLET | Refills: 0 | Status: SHIPPED | OUTPATIENT
Start: 2018-01-04 | End: 2018-05-09

## 2018-01-04 NOTE — TELEPHONE ENCOUNTER
Patient's wife, Sandra, called stating that patient is having very violent delusions and is very confused. She stated that the patient has episodes where he thinks someone is coming to shoot and kill him and his family. Per Dr. Dahl patient should be taken to the emergency room for a full work up and where he can be evaluated by neuropsych. Patient's wife notified and voiced understanding. She stated she would take the patient to the ER.

## 2018-01-04 NOTE — ED TRIAGE NOTES
Family reports pt is more confused than normal for past 3 weeks. Wife reports in past week he has been hallucinating. Family states pt was seen by a neurologist last week and had testing for dementia, but they have not got the tests back.

## 2018-01-04 NOTE — TELEPHONE ENCOUNTER
Spoke to patient's wife. MDP will be sent to pharmacy and the patient will be seen in the office tomorrow.

## 2018-01-05 ENCOUNTER — OFFICE VISIT (OUTPATIENT)
Dept: NEUROSURGERY | Facility: CLINIC | Age: 78
End: 2018-01-05

## 2018-01-05 ENCOUNTER — TELEPHONE (OUTPATIENT)
Dept: SOCIAL WORK | Facility: HOSPITAL | Age: 78
End: 2018-01-05

## 2018-01-05 VITALS
HEART RATE: 81 BPM | HEIGHT: 72 IN | WEIGHT: 185 LBS | DIASTOLIC BLOOD PRESSURE: 68 MMHG | SYSTOLIC BLOOD PRESSURE: 110 MMHG | BODY MASS INDEX: 25.06 KG/M2

## 2018-01-05 DIAGNOSIS — M51.36 DDD (DEGENERATIVE DISC DISEASE), LUMBAR: ICD-10-CM

## 2018-01-05 DIAGNOSIS — G30.1 LATE ONSET ALZHEIMER'S DISEASE WITH BEHAVIORAL DISTURBANCE (HCC): ICD-10-CM

## 2018-01-05 DIAGNOSIS — M54.16 LUMBAR RADICULOPATHY: Primary | ICD-10-CM

## 2018-01-05 DIAGNOSIS — F02.818 LATE ONSET ALZHEIMER'S DISEASE WITH BEHAVIORAL DISTURBANCE (HCC): ICD-10-CM

## 2018-01-05 PROCEDURE — 99213 OFFICE O/P EST LOW 20 MIN: CPT | Performed by: NEUROLOGICAL SURGERY

## 2018-01-05 NOTE — DISCHARGE INSTRUCTIONS
Home, rest, home medicine as prescribed, follow up with PCP and neurologist for recheck and further evaluation.  Return to care with further concerns.

## 2018-01-05 NOTE — CONSULTS
78 yo white male evaluated in ED (Room#12). Wife Sandra and son at bedside. Wife states patient has had intermittent confusion and hallucinations since October 2017. Wife states patient may have had a TIA. Patient currently alert and oriented times 4 without psychosis. Patient saw a psychologist named Vania Veliz recently and results are pending. Patient also scheduled to see a neurologist in April. Wife states they came to the ED to see a neurologist tonight. Patient is sensitive to medication per wife and when they doubled his neurontin he seemed to have more confusion. Patient pleasant, calm, cooperative. Patient denies SI. Denies depression. Denies HI. No psychosis. Spoke with Dr. Naidu re plan of care. Patient does not meet criteria for inpatient psychiatric admission. Spoke with patient and family re importance of following up with psychologist for diagnosis and to keep scheduled appt. With neurologist.

## 2018-01-05 NOTE — ED PROVIDER NOTES
I supervised care provided by the midlevel provider.    We have discussed this patient's history, physical exam, and treatment plan.   I have reviewed the note and personally saw and examined the patient and agree with the plan of care.      Pt presents to the ED with altered mental status in which pt has been confused for the past several months. Over the past 2 days, pt has had visual hallucinations and worsening confusion. Family reports that pt has been evaluated by geriatric psych for dementia; results are currently pending. Family states that pt has not had any focal weakness/numbness or speech/visual difficulties. On physical exam, pt is alert. Pt is disoriented to time and place. Pt follows commands appropriately. Heart is RRR. Lungs are CTAB. UA is relatively unremarkable. Troponin is negative. CXR is negative acute. Pt was referred back to his PMD for close f/u. CCP will follow up about memory care units and other options.                Documentation assistance provided by Bobby Moncada. Information recorded by the scribe was done at my direction and has been verified and validated by me.     Entered by Bobby Moncada, acting as scribe for Dr. Jonathan MD.             Bobby Moncada  01/04/18 7388       Jordon Castillo MD  01/05/18 5536

## 2018-01-05 NOTE — ED PROVIDER NOTES
EMERGENCY DEPARTMENT ENCOUNTER    CHIEF COMPLAINT  Chief Complaint: AMS  History given by: Pt and family  History limited by: Mental Status Change  Room Number: 12/12  PMD: Chris Dahl MD      HPI:  Pt is a 77 y.o. male who presents with his wife and family complaining of AMS. The pt denies having any complaints. The pt states that he is at the hospital to give a speech for [employer]. Per the pt's family, the pt had progressive AMS onset October 10, 2017, but resolved and there were no problems in November. The pt had lumbar decompression surgery in June 2017, and had complications. Per the pt's family, in the past 3 days, the pt has been hallucinating and seeing people who have the intent of hurting him and his family. Per the pt's wife, the pt has been hearing voices.     Duration:  3 days  Onset: Gradual  Timing: Intermittent  Quality: Hallucinations, hearing voices  Intensity/Severity: Moderate  Progression: Worsening  Associated Symptoms: Unspecified  Aggravating Factors: Unspecified  Alleviating Factors: Unspecified  Previous Episodes: The pt had a similar AMS in October 2017 with no diagnosis.   Treatment before arrival: None    PAST MEDICAL HISTORY  Active Ambulatory Problems     Diagnosis Date Noted   • Aortic heart valve narrowing 02/15/2016   • Asymptomatic carotid artery narrowing without infarction 02/15/2016   • Cor pulmonale 02/15/2016   • Functional murmur 02/15/2016   • BP (high blood pressure) 02/15/2016   • Lumbar radiculopathy 02/15/2016   • CN (constipation) 02/15/2016   • DDD (degenerative disc disease), lumbar 02/15/2016   • Fatigue 02/15/2016   • HLD (hyperlipidemia) 02/15/2016   • Amnesia 02/15/2016   • ZAIN (obstructive sleep apnea) 02/15/2016   • Loud breathing during sleep 02/15/2016   • Diabetes mellitus type 2, noninsulin dependent 02/15/2016   • Multiple thyroid nodules 03/30/2017   • Difficulty walking 06/28/2017   • Hearing loss of both ears 09/14/2017   • Episodic confusion  11/06/2017   • Altered mental status 12/15/2017     Resolved Ambulatory Problems     Diagnosis Date Noted   • Back ache 02/15/2016   • Bulge of lumbar disc without myelopathy 02/15/2016   • Leg pain 02/15/2016   • Ache in joint 02/15/2016   • LBP (low back pain) 02/15/2016   • Lumbar canal stenosis 02/15/2016   • Stenosis, spinal, lumbar 06/27/2017   • Surgery follow-up examination 07/11/2017     Past Medical History:   Diagnosis Date   • Acute myocardial infarction    • Anesthesia complication    • Aortic valve sclerosis    • Coronary artery disease    • Diabetes mellitus    • Disease of thyroid gland    • Erectile dysfunction    • Fall    • Hearing difficulty of both ears    • Heart attack    • Heart attack    • Heart murmur    • Hyperlipidemia    • Intermittent claudication    • Osteoarthritis    • Peripheral vascular disease    • Skin cancer    • Sleep apnea    • Spinal stenosis        PAST SURGICAL HISTORY  Past Surgical History:   Procedure Laterality Date   • ATHERECTOMY Left     BRANT FEMORAL-POPLITEAL INITIAL STENOSIS WITH ATHERECTOMY AND STENT LEFT   • COLONOSCOPY     • COLONOSCOPY W/ POLYPECTOMY      BENIGN   • CORONARY ANGIOPLASTY WITH STENT PLACEMENT     • CORONARY ARTERY BYPASS GRAFT  04/2010    cabg x 3: LIMA to LAD, vein graft to RCA and OM1   • LUMBAR LAMINECTOMY DISCECTOMY DECOMPRESSION Bilateral 6/27/2017    Procedure: L3/4, L4/5 OPEN LUMBAR DECOMPRESSION POSTERIOR 1-2 LEVELS;  Surgeon: Ranjeet Contreras MD;  Location: [facility];  Service:    • SPINE SURGERY     • THROMBOENDARTERECTOMY Left     NEUROLOGICAL SURGERY CAROTID ENDARTERECTOMY LEFT       FAMILY HISTORY  Family History   Problem Relation Age of Onset   • Breast cancer Mother    • Cancer Mother    • Brain cancer Sister    • Other Brother      CABG   • Stroke Brother    • Heart disease Brother    • Heart attack Brother    • Malig Hyperthermia Neg Hx        SOCIAL HISTORY  Social History     Social History   • Marital status:       Spouse name: N/A   • Number of children: N/A   • Years of education: N/A     Occupational History   • Not on file.     Social History Main Topics   • Smoking status: Former Smoker     Packs/day: 1.00     Years: 20.00     Quit date: 1985   • Smokeless tobacco: Never Used   • Alcohol use No   • Drug use: No   • Sexual activity: No     Other Topics Concern   • Not on file     Social History Narrative       ALLERGIES  Ativan [lorazepam]; Benzodiazepines; and Diazepam    REVIEW OF SYSTEMS  Review of Systems   Unable to perform ROS: Mental status change       PHYSICAL EXAM  ED Triage Vitals   Temp Heart Rate Resp BP SpO2   01/04/18 1217 01/04/18 1238 01/04/18 1217 01/04/18 1238 01/04/18 1237   97.9 °F (36.6 °C) 90 16 144/85 97 %      Temp src Heart Rate Source Patient Position BP Location FiO2 (%)   01/04/18 1217 01/04/18 1217 -- -- --   Tympanic Monitor          Physical Exam   Constitutional: He is oriented to person, place, and time and well-developed, well-nourished, and in no distress.   HENT:   Head: Normocephalic and atraumatic.   Eyes: EOM are normal. Pupils are equal, round, and reactive to light.   Neck: Normal range of motion. Neck supple.   Cardiovascular: Normal rate and regular rhythm.    Murmur heard.   Systolic murmur is present with a grade of 2/6   Pulmonary/Chest: Effort normal and breath sounds normal. No respiratory distress.   Abdominal: Soft. There is no tenderness. There is no rebound and no guarding.   Musculoskeletal: Normal range of motion. He exhibits no edema.   Lymphadenopathy:     He has no cervical adenopathy.   Neurological: He is alert and oriented to person, place, and time. He has normal sensation and normal strength.   No focal deficits   Skin: Skin is warm and dry.   Psychiatric: Mood normal. He has a flat affect.   Nursing note and vitals reviewed.      LAB RESULTS  Lab Results (last 24 hours)     Procedure Component Value Units Date/Time    CBC & Differential [[id number]] Collected:   01/04/18 1353    Specimen:  Blood Updated:  01/04/18 1417    Narrative:       The following orders were created for panel order CBC & Differential.  Procedure                               Abnormality         Status                     ---------                               -----------         ------                     CBC Auto Differential[[id number]]        Abnormal            Final result                 Please view results for these tests on the individual orders.    Comprehensive Metabolic Panel [[id number]]  (Abnormal) Collected:  01/04/18 1353    Specimen:  Blood Updated:  01/04/18 1440     Glucose 175 (H) mg/dL      BUN 14 mg/dL      Creatinine 1.15 mg/dL      Sodium 139 mmol/L      Potassium 4.1 mmol/L      Chloride 101 mmol/L      CO2 27.7 mmol/L      Calcium 9.3 mg/dL      Total Protein 7.0 g/dL      Albumin 4.00 g/dL      ALT (SGPT) 20 U/L      AST (SGOT) 19 U/L       Specimen hemolyzed.  Results may be affected.        Alkaline Phosphatase 106 U/L      Total Bilirubin 0.5 mg/dL      eGFR Non African Amer 62 mL/min/1.73      Globulin 3.0 gm/dL      A/G Ratio 1.3 g/dL      BUN/Creatinine Ratio 12.2     Anion Gap 10.3 mmol/L     Narrative:       The MDRD GFR formula is only valid for adults with stable renal function between ages 18 and 70.    Troponin [[id number]]  (Normal) Collected:  01/04/18 1353    Specimen:  Blood Updated:  01/04/18 1438     Troponin T <0.010 ng/mL     Narrative:       Troponin T Reference Ranges:  Less than 0.03 ng/mL:    Negative for AMI  0.03 to 0.09 ng/mL:      Indeterminant for AMI  Greater than 0.09 ng/mL: Positive for AMI    Magnesium [[id number]]  (Normal) Collected:  01/04/18 1353    Specimen:  Blood Updated:  01/04/18 1438     Magnesium 2.0 mg/dL     CBC Auto Differential [[id number]]  (Abnormal) Collected:  01/04/18 1353    Specimen:  Blood Updated:  01/04/18 1417     WBC 9.10 10*3/mm3      RBC 4.37 (L) 10*6/mm3      Hemoglobin 13.7 g/dL      Hematocrit 41.6 %      MCV 95.2  fL      MCH 31.4 pg      MCHC 32.9 g/dL      RDW 13.0 %      RDW-SD 44.6 fl      MPV 10.8 fL      Platelets 183 10*3/mm3      Neutrophil % 77.2 (H) %      Lymphocyte % 14.1 (L) %      Monocyte % 6.6 %      Eosinophil % 1.4 %      Basophil % 0.5 %      Immature Grans % 0.2 %      Neutrophils, Absolute 7.02 10*3/mm3      Lymphocytes, Absolute 1.28 10*3/mm3      Monocytes, Absolute 0.60 10*3/mm3      Eosinophils, Absolute 0.13 10*3/mm3      Basophils, Absolute 0.05 10*3/mm3      Immature Grans, Absolute 0.02 10*3/mm3     Urinalysis With / Culture If Indicated - Urine, Clean Catch [[id number]]  (Abnormal) Collected:  01/04/18 1542    Specimen:  Urine from Urine, Clean Catch Updated:  01/04/18 1600     Color, UA Dark Yellow (A)     Appearance, UA Clear     pH, UA <=5.0     Specific Gravity, UA 1.020     Glucose,  mg/dL (Trace) (A)     Ketones, UA Negative     Bilirubin, UA Negative     Blood, UA Negative     Protein, UA Trace (A)     Leuk Esterase, UA Negative     Nitrite, UA Negative     Urobilinogen, UA 1.0 E.U./dL    Narrative:       Urine microscopic not indicated.    POC Glucose Once [[id number]]  (Normal) Collected:  01/04/18 1958    Specimen:  Blood Updated:  01/04/18 2002     Glucose 96 mg/dL     Narrative:       Meter: [id number] : [id number] Merlene Austin RN          I ordered the above labs and reviewed the results    RADIOLOGY  XR Chest 2 View   Final Result   FINDINGS:  1. Stable negative acute chest.  2. There are scattered chronic pulmonary changes, previous median  sternotomy with CABG markers, spondylosis and mild scoliosis.  3. No interval change no evidence of acute process.     This report was finalized on 1/4/2018 2:01 PM by Dr. Zain Smith MD.           I ordered the above noted radiological studies. Interpreted by radiologist. Reviewed by me in PACS.       PROCEDURES  Procedures      PROGRESS AND CONSULTS  ED Course   2107 Placed call to psych for further evaluation with no answer.      2108 Discussed the pt with Alba (psych) who agrees to speak with the pt.     2251 Consulted with psych about the pt, who stated the pt does not meet inpatient criteria, does not have SI or thoughts of harming himself. The pt will have outpatient follow up for further evaluation.     MEDICAL DECISION MAKING  Results were reviewed/discussed with the patient and they were also made aware of online access. Pt also made aware that some labs, such as cultures, will not be resulted during ER visit and follow up with PMD is necessary.     MDM  Number of Diagnoses or Management Options  Dementia with behavioral disturbance, unspecified dementia type:      Amount and/or Complexity of Data Reviewed  Clinical lab tests: reviewed and ordered (Glucose: 175)  Tests in the radiology section of CPT®: ordered and reviewed (CXR: No acute findings)  Decide to obtain previous medical records or to obtain history from someone other than the patient: yes  Review and summarize past medical records: yes (MRI of pt's head in October 2017 showed no acute changes.)  Discuss the patient with other providers: yes (Alba (psych/access))    Patient Progress  Patient progress: stable         DIAGNOSIS  Final diagnoses:   Dementia with behavioral disturbance, unspecified dementia type       DISPOSITION  Disposition: Discharged.    Patient discharged in stable condition.    Reviewed implications of results, diagnosis, meds, responsibility to follow up, warning signs and symptoms of possible worsening, potential complications and reasons to return to ER, including new or worsening symptoms.    Patient/Family voiced understanding of above instructions.    Discussed plan for discharge, as there is no emergent indication for admission.  Pt/family is agreeable and understands need for follow up and repeat testing.  Pt is aware that discharge does not mean that nothing is wrong but it indicates no emergency is present and they must continue care  with follow-up as given below or physician of their choice.     FOLLOW-UP  Chris Dahl MD  [id number] [address]  [phone]    Schedule an appointment as soon as possible for a visit           Medication List      Changed          glipiZIDE 5 MG tablet   Commonly known as:  GLUCOTROL   TAKE 1 TABLET TWICE DAILY BEFORE MEALS   What changed:  See the new instructions.             Latest Documented Vital Signs:  As of 12:04 AM  BP- 163/80 HR- 76 Temp- 98.1 °F (36.7 °C) (Oral) O2 sat- 97%    --  Documentation assistance provided by rosa Spicer for CAROLE Sawyer.  Information recorded by the rosa was done at my direction and has been verified and validated by me.     Rekha Spicer  01/04/18 2208       Rekha Spicer  01/04/18 2307       Rekha Spicer  01/04/18 7233       CAROLE Moura  01/05/18 0004

## 2018-01-05 NOTE — PROGRESS NOTES
Subjective   Patient ID: Anthony Hernandez is a 77 y.o. male is here today for follow-up on leg pain.    Yesterday the patient's wife called the office to report that the patient has stopped taking Gabapentin because it was causing the patient to hallucinate. He is having severe pain when he walks. A MDP was sent to the patient's pharmacy on 1/4/18.    Today the patient's wife reports that the patient has not started the MDP. The patient was seen in the ER on 1/4/18 for confusion. The wife reports that the patient has had a fall since last office visit.     Leg Pain    The incident occurred more than 1 week ago. The pain is present in the left leg, left hip, left thigh and left knee. The quality of the pain is described as aching. Associated symptoms include an inability to bear weight.       The following portions of the patient's history were reviewed and updated as appropriate: allergies, current medications, past family history, past medical history, past social history, past surgical history and problem list.    Review of Systems   All other systems reviewed and are negative.    I saw this patient a few weeks ago. He is about 7 months out from a lumbar decompression and a diskectomy, and he still has persistent right buttock and leg pain. The last visit we discussed a myelogram that did not show any persistent nerve root compression, and we concluded that there really was not anything further to do surgically. We put him on gabapentin in the past and had increased it, but it was causing some confusion and even some hallucinations. He had been taking 100 mg in the morning and 200 at night and that still was the case, so I think it is best to bring it down to 100 mg b.i.d. which is what he was on before. At least it was not seemingly causing any increased confusion. I think putting him on pain medications is probably not a good idea given his increasing confusion from the dementia. He also has a history of being very  sensitive to pain medications. He saw the neuropsychologist and apparently the diagnosis of dementia was confirmed. We had arranged for him to see a neurologist, but that appointment is not until April. We will see if we can try and move it up or find another neurologist who can see him sooner. It does not look like he is going to be able to live at home, and we will see if we can point the wife in the right direction, perhaps Adult Protective Services to help evaluate suitability for nursing home placement. Otherwise there is not a whole lot to do for this residual leg pain other than to keep him on the gabapentin. He has a visit in March, and we will see him at that time.      Objective   Physical Exam   Constitutional: He is oriented to person, place, and time. He appears well-developed and well-nourished.   HENT:   Head: Normocephalic and atraumatic.   Eyes: Conjunctivae and EOM are normal. Pupils are equal, round, and reactive to light.   Fundoscopic exam:       The right eye shows no papilledema. The right eye shows venous pulsations.        The left eye shows no papilledema. The left eye shows venous pulsations.   Neck: Carotid bruit is not present.   Neurological: He is oriented to person, place, and time. He has a normal Finger-Nose-Finger Test and a normal Heel to Shin Test. Gait normal.   Reflex Scores:       Tricep reflexes are 2+ on the right side and 2+ on the left side.       Bicep reflexes are 2+ on the right side and 2+ on the left side.       Brachioradialis reflexes are 2+ on the right side and 2+ on the left side.       Patellar reflexes are 2+ on the right side and 2+ on the left side.       Achilles reflexes are 2+ on the right side and 2+ on the left side.  Psychiatric: His speech is normal.     Neurologic Exam     Mental Status   Oriented to person, place, and time.   Registration of memory: Good recent and remote memory.   Attention: normal. Concentration: normal.   Speech: speech is normal    Level of consciousness: alert  Knowledge: consistent with education.     Cranial Nerves     CN II   Visual fields full to confrontation.   Visual acuity: normal    CN III, IV, VI   Pupils are equal, round, and reactive to light.  Extraocular motions are normal.     CN V   Facial sensation intact.   Right corneal reflex: normal  Left corneal reflex: normal    CN VII   Facial expression full, symmetric.   Right facial weakness: none  Left facial weakness: none    CN VIII   Hearing: intact    CN IX, X   Palate: symmetric    CN XI   Right sternocleidomastoid strength: normal  Left sternocleidomastoid strength: normal    CN XII   Tongue: not atrophic  Tongue deviation: none    Motor Exam   Muscle bulk: normal  Right arm tone: normal  Left arm tone: normal  Right leg tone: normal  Left leg tone: normal    Strength   Strength 5/5 except as noted.     Sensory Exam   Light touch normal.     Gait, Coordination, and Reflexes     Gait  Gait: normal    Coordination   Finger to nose coordination: normal  Heel to shin coordination: normal    Reflexes   Right brachioradialis: 2+  Left brachioradialis: 2+  Right biceps: 2+  Left biceps: 2+  Right triceps: 2+  Left triceps: 2+  Right patellar: 2+  Left patellar: 2+  Right achilles: 2+  Left achilles: 2+  Right : 2+  Left : 2+      Assessment/Plan   Independent Review of Radiographic Studies:    I reviewed again the myelogram done in December that showed a wide decompression at L3-L4 and L4-L5 but no evidence of recurrent herniated disk.      Medical Decision Making:    We will try to move the neurology evaluation up. I will see him in March as had been originally planned. He will go back to his dose of gabapentin at 100 mg twice a day and see if that helps over the next few weeks. I think most of what is happening in terms of the confusion is not medicine-related but probably related to his progressive dementia. We will also see if we can advise the wife by having her  contact Adult Protective Services. This might help to facilitate evaluation for possible nursing home placement.      Anthony was seen today for leg pain.    Diagnoses and all orders for this visit:    Lumbar radiculopathy    DDD (degenerative disc disease), lumbar    Late onset Alzheimer's disease with behavioral disturbance    Return in about 2 months (around 3/5/2018) for Already has f/u visit in March.

## 2018-01-11 ENCOUNTER — PATIENT OUTREACH (OUTPATIENT)
Dept: CASE MANAGEMENT | Facility: OTHER | Age: 78
End: 2018-01-11

## 2018-01-11 NOTE — OUTREACH NOTE
Discussed resources and contact information provided for private duty services, [facility] [employer], [facility] long term care policy, and placement options.  [facility] contact information provided and to contact me as needed.

## 2018-01-17 RX ORDER — GABAPENTIN 100 MG/1
CAPSULE ORAL
Qty: 120 CAPSULE | Refills: 0 | OUTPATIENT
Start: 2018-01-17 | End: 2018-03-21 | Stop reason: SDUPTHER

## 2018-01-17 RX ORDER — GABAPENTIN 100 MG/1
CAPSULE ORAL
Qty: 120 CAPSULE | Refills: 0 | OUTPATIENT
Start: 2018-01-17

## 2018-01-17 NOTE — TELEPHONE ENCOUNTER
This is a duplicate refill request. The Rx has already been called into the patient's pharmacy earlier today.

## 2018-02-01 ENCOUNTER — PATIENT OUTREACH (OUTPATIENT)
Dept: CASE MANAGEMENT | Facility: OTHER | Age: 78
End: 2018-02-01

## 2018-02-01 NOTE — OUTREACH NOTE
Spoke with patient's wife.  She has contacted and enrolled patient with home senior care care with private duty services and has begun process for [facility] benefits as previously discussed.  No behavioral disturbances noted with patient and adherent to medication regimen.  No concerns expressed today and enforced to contact [facility] as needed.

## 2018-02-13 ENCOUNTER — TELEPHONE (OUTPATIENT)
Dept: INTERNAL MEDICINE | Facility: CLINIC | Age: 78
End: 2018-02-13

## 2018-03-21 ENCOUNTER — OFFICE VISIT (OUTPATIENT)
Dept: NEUROSURGERY | Facility: CLINIC | Age: 78
End: 2018-03-21

## 2018-03-21 VITALS
WEIGHT: 184 LBS | DIASTOLIC BLOOD PRESSURE: 72 MMHG | BODY MASS INDEX: 24.92 KG/M2 | SYSTOLIC BLOOD PRESSURE: 114 MMHG | HEART RATE: 78 BPM | HEIGHT: 72 IN

## 2018-03-21 DIAGNOSIS — M54.16 LUMBAR RADICULOPATHY: ICD-10-CM

## 2018-03-21 DIAGNOSIS — M51.36 DDD (DEGENERATIVE DISC DISEASE), LUMBAR: Primary | ICD-10-CM

## 2018-03-21 DIAGNOSIS — G30.1 LATE ONSET ALZHEIMER'S DISEASE WITH BEHAVIORAL DISTURBANCE (HCC): ICD-10-CM

## 2018-03-21 DIAGNOSIS — F02.818 LATE ONSET ALZHEIMER'S DISEASE WITH BEHAVIORAL DISTURBANCE (HCC): ICD-10-CM

## 2018-03-21 PROCEDURE — 99213 OFFICE O/P EST LOW 20 MIN: CPT | Performed by: NEUROLOGICAL SURGERY

## 2018-03-21 RX ORDER — GABAPENTIN 100 MG/1
CAPSULE ORAL
Qty: 90 CAPSULE | Refills: 3 | Status: SHIPPED | OUTPATIENT
Start: 2018-03-21 | End: 2018-07-19 | Stop reason: SDUPTHER

## 2018-03-21 NOTE — PROGRESS NOTES
Subjective   Patient ID: Anthony Hernandez is a 78 y.o. male is here today for follow-up on leg pain.    At the patient's last visit he reported persistent right buttock and leg pain. He also reported having some confusion.    Today the patient reports that there have been no changes with the leg pain or the confusion. The leg pain seems to come on faster and last longer.    Leg Pain    The incident occurred more than 1 week ago. The pain is present in the left leg and left hip. The pain has been constant since onset.       The following portions of the patient's history were reviewed and updated as appropriate: allergies, current medications, past family history, past medical history, past social history, past surgical history and problem list.    Review of Systems   All other systems reviewed and are negative.    He is here with his wife. The appointment with Ace Manning was canceled. That was supposed to be in 04/2018. Apparently his primary care physician arranged for him to see Dr. Chavez of [facility]. Apparently his dementia has been getting worse although his mood swings have not been violent. He continues to have pain on the lower dose of gabapentin at 100 mg bid. We had reduced because of side effects and the concern about falling. His family wants to go ahead and raise it again back to 100/200 which it was at before. When it was at 200/200 he used to have quite a bit of sleepiness. The wife says that they are going to be getting home health services in the next few weeks but we are likely looking at a progressive dementia picture. We have been simply trying to reduce to the greatest extent possible his right buttock and leg pain which he still says continues to hurt him. Will go ahead and raise the dose to 100/200 as indicated above. He will be seeing Dr. Chavez in 05/2018 so will see him after that in 06/2018.      Objective   Physical Exam   Constitutional: He is oriented to person, place, and  time. He appears well-developed and well-nourished.   HENT:   Head: Normocephalic and atraumatic.   Eyes: Conjunctivae and EOM are normal. Pupils are equal, round, and reactive to light.   Fundoscopic exam:       The right eye shows no papilledema. The right eye shows venous pulsations.        The left eye shows no papilledema. The left eye shows venous pulsations.   Neck: Carotid bruit is not present.   Neurological: He is oriented to person, place, and time. He has a normal Finger-Nose-Finger Test and a normal Heel to Shin Test. Gait normal.   Reflex Scores:       Tricep reflexes are 2+ on the right side and 2+ on the left side.       Bicep reflexes are 2+ on the right side and 2+ on the left side.       Brachioradialis reflexes are 2+ on the right side and 2+ on the left side.       Patellar reflexes are 2+ on the right side and 2+ on the left side.       Achilles reflexes are 2+ on the right side and 2+ on the left side.  Psychiatric: His speech is normal.     Neurologic Exam     Mental Status   Oriented to person, place, and time.   Registration of memory: Good recent and remote memory.   Attention: normal. Concentration: normal.   Speech: speech is normal   Level of consciousness: alert  Knowledge: consistent with education.     Cranial Nerves     CN II   Visual fields full to confrontation.   Visual acuity: normal    CN III, IV, VI   Pupils are equal, round, and reactive to light.  Extraocular motions are normal.     CN V   Facial sensation intact.   Right corneal reflex: normal  Left corneal reflex: normal    CN VII   Facial expression full, symmetric.   Right facial weakness: none  Left facial weakness: none    CN VIII   Hearing: intact    CN IX, X   Palate: symmetric    CN XI   Right sternocleidomastoid strength: normal  Left sternocleidomastoid strength: normal    CN XII   Tongue: not atrophic  Tongue deviation: none    Motor Exam   Muscle bulk: normal  Right arm tone: normal  Left arm tone: normal  Right  leg tone: normal  Left leg tone: normal    Strength   Strength 5/5 except as noted.     Sensory Exam   Light touch normal.     Gait, Coordination, and Reflexes     Gait  Gait: normal    Coordination   Finger to nose coordination: normal  Heel to shin coordination: normal    Reflexes   Right brachioradialis: 2+  Left brachioradialis: 2+  Right biceps: 2+  Left biceps: 2+  Right triceps: 2+  Left triceps: 2+  Right patellar: 2+  Left patellar: 2+  Right achilles: 2+  Left achilles: 2+  Right : 2+  Left : 2+      Assessment/Plan   Independent Review of Radiographic Studies:    I reviewed the myelogram done in December 2017 that shows a wide decompression L3-L4 and L4-L5 but no evidence of a recurrent herniated disc.  Agree with the report.      Medical Decision Making:    We will go ahead and increase the dose to 100/200 of gabapentin.  He will be seeing Dr. Chavez in May.  I'll have him come back to see me in 3 months in June.      Anthony was seen today for leg pain.    Diagnoses and all orders for this visit:    DDD (degenerative disc disease), lumbar    Lumbar radiculopathy    Late onset Alzheimer's disease with behavioral disturbance    Other orders  -     gabapentin (NEURONTIN) 100 MG capsule; Take 1 capsule in AM, 2 capsules in PM      Return in about 3 months (around 6/21/2018).

## 2018-03-22 ENCOUNTER — EPISODE CHANGES (OUTPATIENT)
Dept: CASE MANAGEMENT | Facility: OTHER | Age: 78
End: 2018-03-22

## 2018-04-27 RX ORDER — ATORVASTATIN CALCIUM 80 MG/1
80 TABLET, FILM COATED ORAL DAILY
Qty: 90 TABLET | Refills: 1 | Status: SHIPPED | OUTPATIENT
Start: 2018-04-27 | End: 2018-05-08 | Stop reason: SDUPTHER

## 2018-05-04 ENCOUNTER — TELEPHONE (OUTPATIENT)
Dept: INTERNAL MEDICINE | Facility: CLINIC | Age: 78
End: 2018-05-04

## 2018-05-04 DIAGNOSIS — F02.818 LATE ONSET ALZHEIMER'S DISEASE WITH BEHAVIORAL DISTURBANCE (HCC): Primary | ICD-10-CM

## 2018-05-04 DIAGNOSIS — G30.1 LATE ONSET ALZHEIMER'S DISEASE WITH BEHAVIORAL DISTURBANCE (HCC): Primary | ICD-10-CM

## 2018-05-04 DIAGNOSIS — R41.82 ALTERED MENTAL STATUS, UNSPECIFIED ALTERED MENTAL STATUS TYPE: ICD-10-CM

## 2018-05-04 NOTE — TELEPHONE ENCOUNTER
Patient's wife, Sandra, called stating that patient has dementia and recently has been having issues with using the restroom on him self and not realizing it. Patient's wife stated he also sometimes has leakage that he is not aware of. Patient's wife wanted to know if home health could be ordered so that she can be instructed on how to take care of this new issue in the future. Per Dr. Nabila kim to order home health. Order put into [facility]. Patient's wife notified.

## 2018-05-05 ENCOUNTER — LAB REQUISITION (OUTPATIENT)
Dept: LAB | Facility: HOSPITAL | Age: 78
End: 2018-05-05

## 2018-05-05 DIAGNOSIS — R35.0 FREQUENCY OF MICTURITION: ICD-10-CM

## 2018-05-05 DIAGNOSIS — R82.90 ABNORMAL FINDING IN URINE: ICD-10-CM

## 2018-05-05 LAB
BILIRUB UR QL STRIP: NEGATIVE
CLARITY UR: CLEAR
COLOR UR: YELLOW
GLUCOSE UR STRIP-MCNC: NEGATIVE MG/DL
HGB UR QL STRIP.AUTO: NEGATIVE
KETONES UR QL STRIP: NEGATIVE
LEUKOCYTE ESTERASE UR QL STRIP.AUTO: NEGATIVE
NITRITE UR QL STRIP: NEGATIVE
PH UR STRIP.AUTO: 6 [PH] (ref 5–8)
PROT UR QL STRIP: NEGATIVE
SP GR UR STRIP: 1.01 (ref 1–1.03)
UROBILINOGEN UR QL STRIP: NORMAL

## 2018-05-05 PROCEDURE — 81003 URINALYSIS AUTO W/O SCOPE: CPT | Performed by: FAMILY MEDICINE

## 2018-05-07 ENCOUNTER — TELEPHONE (OUTPATIENT)
Dept: INTERNAL MEDICINE | Facility: CLINIC | Age: 78
End: 2018-05-07

## 2018-05-07 NOTE — TELEPHONE ENCOUNTER
LVM- patient's wife notified(OK per HIPAA form.) Patient's wife advised to contact office if they have any questions.

## 2018-05-07 NOTE — TELEPHONE ENCOUNTER
----- Message from Chris Dahl MD sent at 5/7/2018 11:34 AM EDT -----  The labs were reviewed. Please inform patient that labs were normal.

## 2018-05-07 NOTE — TELEPHONE ENCOUNTER
Connie, [phone], from [facility] called stating that patient is having incontinence with his stools. Connie stated that she told the patient's wife to watch for blood in the stool and to contact Dr. Dahl if she notices changes. Patient's wife spoke with Connie and told her that his stool has always been dark since he is started the iron supplement he is on. Dr. Dahl notified. Connie wanted to see if she could get orders for social work services to be sent out and to obtain a hemoglobin A1C from the patient. Per Dr. Dahl these orders are okay to do. Connie notified and voiced understanding.

## 2018-05-08 ENCOUNTER — TELEPHONE (OUTPATIENT)
Dept: INTERNAL MEDICINE | Facility: CLINIC | Age: 78
End: 2018-05-08

## 2018-05-08 ENCOUNTER — LAB REQUISITION (OUTPATIENT)
Dept: LAB | Facility: HOSPITAL | Age: 78
End: 2018-05-08

## 2018-05-08 DIAGNOSIS — Z00.00 ENCOUNTER FOR GENERAL ADULT MEDICAL EXAMINATION WITHOUT ABNORMAL FINDINGS: ICD-10-CM

## 2018-05-08 LAB — HBA1C MFR BLD: 6.23 % (ref 4.8–5.6)

## 2018-05-08 PROCEDURE — 83036 HEMOGLOBIN GLYCOSYLATED A1C: CPT | Performed by: FAMILY MEDICINE

## 2018-05-08 RX ORDER — ATORVASTATIN CALCIUM 80 MG/1
80 TABLET, FILM COATED ORAL DAILY
Qty: 90 TABLET | Refills: 1 | Status: SHIPPED | OUTPATIENT
Start: 2018-05-08 | End: 2018-11-12 | Stop reason: SDUPTHER

## 2018-05-08 RX ORDER — GLIPIZIDE 5 MG/1
7.5 TABLET ORAL
Qty: 270 TABLET | Refills: 1 | Status: SHIPPED | OUTPATIENT
Start: 2018-05-08 | End: 2018-09-19

## 2018-05-08 NOTE — TELEPHONE ENCOUNTER
Aleida ([phone]) from [facility] called stating that she visited with the patient today and noticed that he has thrush in his mouth. Aleida stated that patient's breath was very pungent. Aleida stated that patient's wife told her she has not been able to get the patient's dentures out for a while. Aleida stated that she got the patient's dentures out today and instructed the patient's wife on good oral hygiene. Per Dr. Dahl call in [facility] Mouthwash (Nystatin, Lidocaine, and Malox/equal parts) swish and spit 5 mLs po TID. Prescription sent into pharmacy. Aleida notified. She stated she would inform patient's wife.

## 2018-05-08 NOTE — TELEPHONE ENCOUNTER
----- Message from Chris Dahl MD sent at 5/8/2018  1:59 PM EDT -----  The labs were reviewed. Please inform patient that labs were normal.

## 2018-05-09 ENCOUNTER — OFFICE VISIT (OUTPATIENT)
Dept: INTERNAL MEDICINE | Facility: CLINIC | Age: 78
End: 2018-05-09

## 2018-05-09 VITALS
HEART RATE: 81 BPM | OXYGEN SATURATION: 99 % | BODY MASS INDEX: 23.99 KG/M2 | HEIGHT: 72 IN | SYSTOLIC BLOOD PRESSURE: 100 MMHG | WEIGHT: 177.1 LBS | DIASTOLIC BLOOD PRESSURE: 60 MMHG

## 2018-05-09 DIAGNOSIS — E03.9 HYPOTHYROIDISM, UNSPECIFIED TYPE: ICD-10-CM

## 2018-05-09 DIAGNOSIS — F03.90 DEMENTIA WITHOUT BEHAVIORAL DISTURBANCE, UNSPECIFIED DEMENTIA TYPE: Primary | ICD-10-CM

## 2018-05-09 PROCEDURE — 99214 OFFICE O/P EST MOD 30 MIN: CPT | Performed by: FAMILY MEDICINE

## 2018-05-10 ENCOUNTER — TELEPHONE (OUTPATIENT)
Dept: INTERNAL MEDICINE | Facility: CLINIC | Age: 78
End: 2018-05-10

## 2018-05-10 LAB
ALBUMIN SERPL-MCNC: 4.5 G/DL (ref 3.5–5.2)
ALBUMIN/GLOB SERPL: 2.1 G/DL
ALP SERPL-CCNC: 91 U/L (ref 39–117)
ALT SERPL-CCNC: 15 U/L (ref 1–41)
AST SERPL-CCNC: 19 U/L (ref 1–40)
BILIRUB SERPL-MCNC: 0.5 MG/DL (ref 0.1–1.2)
BUN SERPL-MCNC: 15 MG/DL (ref 8–23)
BUN/CREAT SERPL: 13 (ref 7–25)
CALCIUM SERPL-MCNC: 10 MG/DL (ref 8.6–10.5)
CHLORIDE SERPL-SCNC: 101 MMOL/L (ref 98–107)
CO2 SERPL-SCNC: 28.1 MMOL/L (ref 22–29)
CREAT SERPL-MCNC: 1.15 MG/DL (ref 0.76–1.27)
FT4I SERPL CALC-MCNC: 2.2 (ref 1.2–4.9)
GFR SERPLBLD CREATININE-BSD FMLA CKD-EPI: 62 ML/MIN/1.73
GFR SERPLBLD CREATININE-BSD FMLA CKD-EPI: 75 ML/MIN/1.73
GLOBULIN SER CALC-MCNC: 2.1 GM/DL
GLUCOSE SERPL-MCNC: 156 MG/DL (ref 65–99)
POTASSIUM SERPL-SCNC: 4.6 MMOL/L (ref 3.5–5.2)
PROT SERPL-MCNC: 6.6 G/DL (ref 6–8.5)
SODIUM SERPL-SCNC: 142 MMOL/L (ref 136–145)
T3RU NFR SERPL: 30 % (ref 24–39)
T4 SERPL-MCNC: 7.4 UG/DL (ref 4.5–12)
TSH SERPL DL<=0.005 MIU/L-ACNC: 2.07 UIU/ML (ref 0.45–4.5)

## 2018-05-10 NOTE — TELEPHONE ENCOUNTER
----- Message from Chris Dahl MD sent at 5/10/2018  8:57 AM EDT -----  The labs were reviewed. Please inform patient that labs were normal.

## 2018-05-15 ENCOUNTER — TELEPHONE (OUTPATIENT)
Dept: INTERNAL MEDICINE | Facility: CLINIC | Age: 78
End: 2018-05-15

## 2018-05-15 NOTE — TELEPHONE ENCOUNTER
Nyasia ([phone]) from [facility] called stating that patient's thrush is getting better and patient is out of Magic Mouthwash. Patient was wondering if he could get a refill. Nyasia also stated that patient's wife's was wondering if the directions could be for swish and swallow instead of swish and spit because the patient is having a hard time following directions and is swallowing some of the medication. Per Dr. Dahl ok to refill, keep directions as swish and spit. It is okay if patient is swallowing a minimal amount of the medication. Nyasia also wanted to know if she could get orders to extend home health for a couple more weeks because patient's neurologist has recently put him on Aricept and patient is having some side effects of this medication. Per Dr. Dahl, ok to prolong visits. LVM- on Nyasia's voicemail notifying her. Prescription called into [facility] pharmacy ([phone]) per patient's wife request.

## 2018-05-23 NOTE — PROGRESS NOTES
Subjective   Anthony Hernandez is a 78 y.o. male.     Chief Complaint   Patient presents with   • Face To Face For Home Health   • Alzheimer's Disease         History of Present Illness     At today's office visit patient's presents with his wife.  Patient continues to have further decline in his cognitive abilities.  Patient does not do much talking today's office visit.  The patient's wife states primary history giver at today's office visit.  Patient's wife states that she sees a further decline in his cognition.  She states that it is continuing.  They had seen a neurologist, who initially told patient to continue the olanzapine.  Patient has follow-up with the neurologist few months from now.  They're currently getting home health to come visit the house.  Patient also to physical therapy and occupational therapy at the home.  The patient's wife states that the patient continues to have hallucinations that could be related to his underlying dementia.    The following portions of the patient's history were reviewed and updated as appropriate: allergies, current medications, past family history, past medical history, past social history, past surgical history and problem list.    Review of Systems   Constitutional: Negative for chills and fever.   HENT: Negative for congestion, rhinorrhea, sinus pain and sore throat.    Eyes: Negative for photophobia and visual disturbance.   Respiratory: Negative for cough, chest tightness and shortness of breath.    Cardiovascular: Negative for chest pain and palpitations.   Gastrointestinal: Negative for diarrhea, nausea and vomiting.   Genitourinary: Negative for dysuria, frequency and urgency.   Skin: Negative for rash and wound.   Neurological: Negative for dizziness and syncope.   Psychiatric/Behavioral: Negative for behavioral problems and confusion.       Objective   Physical Exam   Constitutional: He is oriented to person, place, and time. He appears well-developed and  well-nourished.   HENT:   Head: Normocephalic and atraumatic.   Right Ear: External ear normal.   Left Ear: External ear normal.   Mouth/Throat: Oropharynx is clear and moist.   Eyes: EOM are normal.   Neck: Normal range of motion. Neck supple.   Cardiovascular: Normal rate, regular rhythm and normal heart sounds.    Pulmonary/Chest: Effort normal and breath sounds normal. No respiratory distress.   Musculoskeletal: Normal range of motion.   Lymphadenopathy:     He has no cervical adenopathy.   Neurological: He is alert and oriented to person, place, and time.   Skin: Skin is warm.   Psychiatric: He has a normal mood and affect. His behavior is normal.   Nursing note and vitals reviewed.      Assessment/Plan   Anthony was seen today for face to face for home health and alzheimer's disease.    Diagnoses and all orders for this visit:    Dementia without behavioral disturbance, unspecified dementia type  -     Comprehensive Metabolic Panel  -     At today's office visit is unclear patient has underlying dementia to be related to patient's signs and symptoms of his decline in his cognition.  Patient may also have underlying mood disorder, hence the use of taking olanzapine.  I recommended patient to be evaluated by neurology for further evaluation, as patient could have underlying Alzheimer's.  However with patient's history of hallucinations, it could be questionable Lewy body dementia as well.  It is unclear if Namenda would be appropriate for patient at this time.  I recommended patient to be seen by neurology for further evaluation and further treatment options.    Hypothyroidism, unspecified type  -     Thyroid Panel With TSH  -     Patient's currently taken levothyroxine 50 µg daily. We'll recheck thyroid panel at today's office visit to make sure there is no underlying hypothyroidism which could be leading the patient signs and symptoms.          No Follow-up on file.    Dictated utilizing Dragon Voice Recognition  Software

## 2018-06-14 ENCOUNTER — OFFICE VISIT (OUTPATIENT)
Dept: INTERNAL MEDICINE | Facility: CLINIC | Age: 78
End: 2018-06-14

## 2018-06-14 VITALS
HEART RATE: 86 BPM | BODY MASS INDEX: 23.68 KG/M2 | OXYGEN SATURATION: 97 % | SYSTOLIC BLOOD PRESSURE: 118 MMHG | HEIGHT: 72 IN | WEIGHT: 174.8 LBS | DIASTOLIC BLOOD PRESSURE: 72 MMHG

## 2018-06-14 DIAGNOSIS — F02.818 LATE ONSET ALZHEIMER'S DISEASE WITH BEHAVIORAL DISTURBANCE (HCC): Primary | ICD-10-CM

## 2018-06-14 DIAGNOSIS — G30.1 LATE ONSET ALZHEIMER'S DISEASE WITH BEHAVIORAL DISTURBANCE (HCC): Primary | ICD-10-CM

## 2018-06-14 DIAGNOSIS — R51.9 ACUTE INTRACTABLE HEADACHE, UNSPECIFIED HEADACHE TYPE: ICD-10-CM

## 2018-06-14 PROCEDURE — 99214 OFFICE O/P EST MOD 30 MIN: CPT | Performed by: FAMILY MEDICINE

## 2018-06-14 RX ORDER — MEMANTINE HYDROCHLORIDE 10 MG/1
5 TABLET ORAL 2 TIMES DAILY
Status: ON HOLD | COMMUNITY
Start: 2018-06-07 | End: 2021-01-01

## 2018-06-14 NOTE — PROGRESS NOTES
"  Subjective   Anthony Hernandez is a 78 y.o. male.     Chief Complaint   Patient presents with   • Headache     x3 days   • Generalized Body Aches   • Blurred Vision     Right eye worse, fuzzy vision         History of Present Illness     Patient is 78-year-old gentleman that presents today for follow-up.  History is mostly obtained by patient's wife due to patient's cognitive decline.  She notes that he continues to have cognitive decline.  She does note that he got better after he saw neurology, and was taken off of the olanzapine.  She states that he was like a whole new person will not be on the medicine.  She states that the patient wasn't started on Namenda.  Patient is currently taking 5 mg twice a day.  Patient's wife notes that the Namenda dose was increased to 10 mg twice a day.  Shortly after, the last few days, patient started developing headaches.  She notes that the patient was not himself during this time.  She notes that she contact neurology, and was brought back down in dose to Namenda 5 mg twice a day.  She notes that he is getting better in terms of headaches, and that his headache has almost resolved.  She notes that he has visual disturbances from time to time.  Patient is not able to give a thorough history of presenting problems.  Patient states at today's office visit that he feels \"fine\".    The following portions of the patient's history were reviewed and updated as appropriate: allergies, current medications, past family history, past medical history, past social history, past surgical history and problem list.    Review of Systems   Constitutional: Negative for chills and fever.   HENT: Negative for congestion, rhinorrhea, sinus pain and sore throat.    Eyes: Positive for visual disturbance.   Respiratory: Negative for cough, chest tightness and shortness of breath.    Cardiovascular: Negative for chest pain and palpitations.   Gastrointestinal: Negative for diarrhea, nausea and vomiting. "   Genitourinary: Negative for dysuria, frequency and urgency.   Skin: Negative for rash and wound.   Neurological: Negative for dizziness and syncope.   Psychiatric/Behavioral: Negative for behavioral problems and confusion.       Objective   Physical Exam   Constitutional: He is oriented to person, place, and time. He appears well-developed and well-nourished.   HENT:   Head: Normocephalic and atraumatic.   Right Ear: External ear normal.   Left Ear: External ear normal.   Mouth/Throat: Oropharynx is clear and moist.   Eyes: EOM are normal.   Neck: Normal range of motion. Neck supple.   Cardiovascular: Normal rate, regular rhythm and normal heart sounds.    Pulmonary/Chest: Effort normal and breath sounds normal. No respiratory distress.   Musculoskeletal: Normal range of motion.   Lymphadenopathy:     He has no cervical adenopathy.   Neurological: He is alert and oriented to person, place, and time.   Skin: Skin is warm.   Psychiatric: He has a normal mood and affect. His behavior is normal.   Nursing note and vitals reviewed.      Assessment/Plan   Anthony was seen today for headache, generalized body aches and blurred vision.    Diagnoses and all orders for this visit:    Late onset Alzheimer's disease with behavioral disturbance        -     Patient does show improvement after stopping the olanzapine.  This time discussed patient that we will continue to stay off olanzapine.  She continued to follow-up with neurology.  Patient's lower dose of Namenda seems to be working well for him.  I discussed with patient that she stay on this medication for some time, and then be seen by neurology if he were to be needed to increase.  Also discussed with patient's wife, that if home health as needed, we are happy to arrange that as well.    Acute intractable headache, unspecified headache type  -     Ambulatory Referral to Ophthalmology  -     Patient's headache is most likely related to the increased dose of Namenda.   Patient is unclear about his visual changes which happened to be intermittent.  Patient's blood glucose levels have been within normal limits.  We'll send patient to ophthalmology for further evaluation treatment.      At today's office visit I've spent greater than 25 minutes with patient.  During this time, more than half the time was spent in counseling patient about disease pathology as well as coordinating care for home health if needed.  I've counseled patient and family about disease progression.    No Follow-up on file.    Dictated utilizing Dragon Voice Recognition Software

## 2018-06-22 RX ORDER — LEVOTHYROXINE SODIUM 0.07 MG/1
75 TABLET ORAL DAILY
Qty: 90 TABLET | Refills: 1 | Status: SHIPPED | OUTPATIENT
Start: 2018-06-22 | End: 2018-11-12 | Stop reason: SDUPTHER

## 2018-06-22 NOTE — TELEPHONE ENCOUNTER
Patient's wife, Sandra [phone], called stating that patient needs a refill on levothyroxine 75 mcg. Patient's medication list stated he was taking 50 mcg. Per patient's wife the correct dose is 75 mcg. Prescription sent to [facility]. Dose corrected on patient's medication list.

## 2018-07-12 NOTE — PROGRESS NOTES
Subjective   Patient ID: Anthony Hernandez is a 78 y.o. male is here today for follow-up on leg pain.    At the last visit the patient reported that he was still having persistent left leg pain. He also reported worsening confusion. His Gabapentin was increased from 100 mg BID to 100/200 mg at the last visit.    Today the patient reports that there has been no changes with the leg pain. The patient has started taking Namenda since the last visit and the wife reports that she has seen some improvement with his memory and confusion.      Leg Pain    The incident occurred more than 1 week ago. There was no injury mechanism. The pain is present in the left hip and left thigh. The pain has been fluctuating since onset. He reports no foreign bodies present.       The following portions of the patient's history were reviewed and updated as appropriate: allergies, current medications, past family history, past medical history, past social history, past surgical history and problem list.    Review of Systems   Psychiatric/Behavioral: Positive for confusion.   All other systems reviewed and are negative.    He is with his wife. Since I saw him he was put on Namenda and apparently he is better. He seems a little bit more clear headed and apparently his mood swings are better as well. He is still on 100/200 of the gabapentin and he still has some left buttock and leg pain. I reminded him and his wife that there was really not much from an operative standpoint. He is about a year out from his lumbar decompression at L3-L4 and L4-L5 with left-sided diskectomy at L3-L4 and L4-L5. It is fine to raise the dose to 200/200 and maybe even raise it some more now that he is generally feeling better. We will see him in 6 months.         Objective   Physical Exam   Constitutional: He is oriented to person, place, and time. He appears well-developed and well-nourished.   HENT:   Head: Normocephalic and atraumatic.   Eyes: Conjunctivae and EOM  are normal. Pupils are equal, round, and reactive to light.   Fundoscopic exam:       The right eye shows no papilledema. The right eye shows venous pulsations.        The left eye shows no papilledema. The left eye shows venous pulsations.   Neck: Carotid bruit is not present.   Neurological: He is oriented to person, place, and time. He has a normal Finger-Nose-Finger Test and a normal Heel to Shin Test. Gait normal.   Reflex Scores:       Tricep reflexes are 2+ on the right side and 2+ on the left side.       Bicep reflexes are 2+ on the right side and 2+ on the left side.       Brachioradialis reflexes are 2+ on the right side and 2+ on the left side.       Patellar reflexes are 2+ on the right side and 2+ on the left side.       Achilles reflexes are 2+ on the right side and 2+ on the left side.  Psychiatric: His speech is normal.     Neurologic Exam     Mental Status   Oriented to person, place, and time.   Registration of memory: Good recent and remote memory.   Attention: normal. Concentration: normal.   Speech: speech is normal   Level of consciousness: alert  Knowledge: consistent with education.     Cranial Nerves     CN II   Visual fields full to confrontation.   Visual acuity: normal    CN III, IV, VI   Pupils are equal, round, and reactive to light.  Extraocular motions are normal.     CN V   Facial sensation intact.   Right corneal reflex: normal  Left corneal reflex: normal    CN VII   Facial expression full, symmetric.   Right facial weakness: none  Left facial weakness: none    CN VIII   Hearing: intact    CN IX, X   Palate: symmetric    CN XI   Right sternocleidomastoid strength: normal  Left sternocleidomastoid strength: normal    CN XII   Tongue: not atrophic  Tongue deviation: none    Motor Exam   Muscle bulk: normal  Right arm tone: normal  Left arm tone: normal  Right leg tone: normal  Left leg tone: normal    Strength   Strength 5/5 except as noted.     Sensory Exam   Light touch normal.      Gait, Coordination, and Reflexes     Gait  Gait: normal    Coordination   Finger to nose coordination: normal  Heel to shin coordination: normal    Reflexes   Right brachioradialis: 2+  Left brachioradialis: 2+  Right biceps: 2+  Left biceps: 2+  Right triceps: 2+  Left triceps: 2+  Right patellar: 2+  Left patellar: 2+  Right achilles: 2+  Left achilles: 2+  Right : 2+  Left : 2+      Assessment/Plan   Independent Review of Radiographic Studies:    I reviewed again his myelogram demonstrated 2017 which showed a wide decompression at L3-L4 and L4-L5 with no evidence of a recurrent herniated disc.  Agree with the report.      Medical Decision Making:    We will again raise his gabapentin to 200/200.  His wife indicates that he has not medicines for now.  We'll see how he is doing in 6 months.  I asked her to call in about 6-8 weeks to let us know if that dose is helpful and if not we could consider raising.      Anthony was seen today for leg pain.    Diagnoses and all orders for this visit:    DDD (degenerative disc disease), lumbar    Lumbar radiculopathy      Return in about 6 months (around 1/13/2019).

## 2018-07-13 ENCOUNTER — OFFICE VISIT (OUTPATIENT)
Dept: NEUROSURGERY | Facility: CLINIC | Age: 78
End: 2018-07-13

## 2018-07-13 VITALS
HEIGHT: 72 IN | HEART RATE: 75 BPM | BODY MASS INDEX: 23.57 KG/M2 | DIASTOLIC BLOOD PRESSURE: 64 MMHG | WEIGHT: 174 LBS | SYSTOLIC BLOOD PRESSURE: 95 MMHG

## 2018-07-13 DIAGNOSIS — M54.16 LUMBAR RADICULOPATHY: ICD-10-CM

## 2018-07-13 DIAGNOSIS — M51.36 DDD (DEGENERATIVE DISC DISEASE), LUMBAR: Primary | ICD-10-CM

## 2018-07-13 PROCEDURE — 99213 OFFICE O/P EST LOW 20 MIN: CPT | Performed by: NEUROLOGICAL SURGERY

## 2018-07-19 NOTE — TELEPHONE ENCOUNTER
Patients wife called this morning regarding this medication, and he is requesting refill. I let her know that we had received the RX request from the pharmacy and informed her that Dr. Contreras is in surgery today and we would have to wait form to sign off on it for it to be refilled.

## 2018-07-23 RX ORDER — GABAPENTIN 100 MG/1
100 CAPSULE ORAL 2 TIMES DAILY
Qty: 120 CAPSULE | Refills: 3 | Status: SHIPPED | OUTPATIENT
Start: 2018-07-23 | End: 2018-11-09 | Stop reason: DRUGHIGH

## 2018-07-23 NOTE — TELEPHONE ENCOUNTER
Pt wife called he is taking Gabapentin 200 mg BID prescribed last visit.     *This is the newest prescription request that will need to be approved.     Please sign off on Rx

## 2018-09-14 ENCOUNTER — OFFICE VISIT (OUTPATIENT)
Dept: INTERNAL MEDICINE | Facility: CLINIC | Age: 78
End: 2018-09-14

## 2018-09-14 VITALS
SYSTOLIC BLOOD PRESSURE: 132 MMHG | HEART RATE: 63 BPM | RESPIRATION RATE: 16 BRPM | DIASTOLIC BLOOD PRESSURE: 90 MMHG | WEIGHT: 180.7 LBS | BODY MASS INDEX: 24.47 KG/M2 | HEIGHT: 72 IN | OXYGEN SATURATION: 98 % | TEMPERATURE: 98.3 F

## 2018-09-14 DIAGNOSIS — E78.5 HYPERLIPIDEMIA, UNSPECIFIED HYPERLIPIDEMIA TYPE: Primary | ICD-10-CM

## 2018-09-14 DIAGNOSIS — Z23 NEED FOR INFLUENZA VACCINATION: ICD-10-CM

## 2018-09-14 DIAGNOSIS — F03.90 DEMENTIA WITHOUT BEHAVIORAL DISTURBANCE, UNSPECIFIED DEMENTIA TYPE: ICD-10-CM

## 2018-09-14 DIAGNOSIS — E03.9 HYPOTHYROIDISM, UNSPECIFIED TYPE: ICD-10-CM

## 2018-09-14 DIAGNOSIS — E11.9 DIABETES MELLITUS TYPE 2, NONINSULIN DEPENDENT (HCC): ICD-10-CM

## 2018-09-14 PROCEDURE — 90662 IIV NO PRSV INCREASED AG IM: CPT | Performed by: FAMILY MEDICINE

## 2018-09-14 PROCEDURE — 99214 OFFICE O/P EST MOD 30 MIN: CPT | Performed by: FAMILY MEDICINE

## 2018-09-14 PROCEDURE — G0008 ADMIN INFLUENZA VIRUS VAC: HCPCS | Performed by: FAMILY MEDICINE

## 2018-09-14 RX ORDER — RIVASTIGMINE 4.6 MG/24H
1 PATCH, EXTENDED RELEASE TRANSDERMAL
COMMUNITY
Start: 2018-08-15 | End: 2018-09-14

## 2018-09-14 RX ORDER — MEMANTINE HYDROCHLORIDE 10 MG/1
5 TABLET ORAL
COMMUNITY
Start: 2018-08-13 | End: 2018-09-14 | Stop reason: DRUGHIGH

## 2018-09-14 NOTE — PROGRESS NOTES
Subjective   Anthony Hernandez is a 78 y.o. male.     Chief Complaint   Patient presents with   • Follow-up     for alzheimers,dementia  medication change    • Allergies     sneezing, runny nosse            History of Present Illness     Patient notes that he is doing well. Patient is here with his wife, and she notes that he is doing better. He is on the namenda 5mg bid. Wife notes that he is doing well on that dose.    Patient has a hx of hypothyroidism. Patient is currently taking levothyroxine 75mcg daily. Patient states the medication is doing well for him.    Patient notes that he has hyperlipidemia, and currently takes lipitor 80mg daily.    Patient notes to have dm2, and notes that he takes glipizide 7mg bid.     The following portions of the patient's history were reviewed and updated as appropriate: allergies, current medications, past family history, past medical history, past social history, past surgical history and problem list.    Review of Systems   Constitutional: Negative for chills and fever.   HENT: Negative for congestion, rhinorrhea, sinus pain and sore throat.    Eyes: Negative for photophobia and visual disturbance.   Respiratory: Negative for cough, chest tightness and shortness of breath.    Cardiovascular: Negative for chest pain and palpitations.   Gastrointestinal: Negative for diarrhea, nausea and vomiting.   Genitourinary: Negative for dysuria, frequency and urgency.   Skin: Negative for rash and wound.   Neurological: Negative for dizziness and syncope.   Psychiatric/Behavioral: Negative for behavioral problems and confusion.       Objective   Physical Exam   Constitutional: He is oriented to person, place, and time. He appears well-developed and well-nourished.   HENT:   Head: Normocephalic and atraumatic.   Right Ear: External ear normal.   Left Ear: External ear normal.   Mouth/Throat: Oropharynx is clear and moist.   Eyes: EOM are normal.   Neck: Normal range of motion. Neck supple.    Cardiovascular: Normal rate, regular rhythm and normal heart sounds.    Pulmonary/Chest: Effort normal and breath sounds normal. No respiratory distress.   Musculoskeletal: Normal range of motion.   Lymphadenopathy:     He has no cervical adenopathy.   Neurological: He is alert and oriented to person, place, and time.   Skin: Skin is warm.   Psychiatric: He has a normal mood and affect. His behavior is normal.   Nursing note and vitals reviewed.      Assessment/Plan   Anthony was seen today for follow-up and allergies.    Diagnoses and all orders for this visit:    Hyperlipidemia, unspecified hyperlipidemia type  -     Lipid Panel With LDL / HDL Ratio  -     Continue atorvastatin 80mg daily.    Diabetes mellitus type 2, noninsulin dependent (CMS/HCC)  -     Comprehensive Metabolic Panel  -     Hemoglobin A1c    Dementia without behavioral disturbance, unspecified dementia type  -     CBC & Differential    Hypothyroidism, unspecified type  -     Thyroid Panel With TSH  -     Continue 75mcg of levothyroxine.     Need for influenza vaccination  -     Fluzone High Dose =>65Years          No Follow-up on file.    Dictated utilizing Dragon Voice Recognition Software

## 2018-09-15 LAB
ALBUMIN SERPL-MCNC: 4.3 G/DL (ref 3.5–5.2)
ALBUMIN/GLOB SERPL: 1.7 G/DL
ALP SERPL-CCNC: 87 U/L (ref 39–117)
ALT SERPL-CCNC: 15 U/L (ref 1–41)
AST SERPL-CCNC: 14 U/L (ref 1–40)
BASOPHILS # BLD AUTO: 0.04 10*3/MM3 (ref 0–0.2)
BASOPHILS NFR BLD AUTO: 0.5 % (ref 0–1.5)
BILIRUB SERPL-MCNC: 0.4 MG/DL (ref 0.1–1.2)
BUN SERPL-MCNC: 14 MG/DL (ref 8–23)
BUN/CREAT SERPL: 14.7 (ref 7–25)
CALCIUM SERPL-MCNC: 9.8 MG/DL (ref 8.6–10.5)
CHLORIDE SERPL-SCNC: 103 MMOL/L (ref 98–107)
CHOLEST SERPL-MCNC: 135 MG/DL (ref 0–200)
CO2 SERPL-SCNC: 29 MMOL/L (ref 22–29)
CREAT SERPL-MCNC: 0.95 MG/DL (ref 0.76–1.27)
EOSINOPHIL # BLD AUTO: 0.19 10*3/MM3 (ref 0–0.7)
EOSINOPHIL NFR BLD AUTO: 2.4 % (ref 0.3–6.2)
ERYTHROCYTE [DISTWIDTH] IN BLOOD BY AUTOMATED COUNT: 13.3 % (ref 11.5–14.5)
FT4I SERPL CALC-MCNC: 2 (ref 1.2–4.9)
GLOBULIN SER CALC-MCNC: 2.5 GM/DL
GLUCOSE SERPL-MCNC: 62 MG/DL (ref 65–99)
HBA1C MFR BLD: 5.7 % (ref 4.8–5.6)
HCT VFR BLD AUTO: 38.3 % (ref 40.4–52.2)
HDLC SERPL-MCNC: 67 MG/DL (ref 40–60)
HGB BLD-MCNC: 12.6 G/DL (ref 13.7–17.6)
IMM GRANULOCYTES # BLD: 0.01 10*3/MM3 (ref 0–0.03)
IMM GRANULOCYTES NFR BLD: 0.1 % (ref 0–0.5)
LDLC SERPL CALC-MCNC: 58 MG/DL (ref 0–100)
LDLC/HDLC SERPL: 0.86 {RATIO}
LYMPHOCYTES # BLD AUTO: 2.09 10*3/MM3 (ref 0.9–4.8)
LYMPHOCYTES NFR BLD AUTO: 26.6 % (ref 19.6–45.3)
MCH RBC QN AUTO: 30.7 PG (ref 27–32.7)
MCHC RBC AUTO-ENTMCNC: 32.9 G/DL (ref 32.6–36.4)
MCV RBC AUTO: 93.4 FL (ref 79.8–96.2)
MONOCYTES # BLD AUTO: 0.66 10*3/MM3 (ref 0.2–1.2)
MONOCYTES NFR BLD AUTO: 8.4 % (ref 5–12)
NEUTROPHILS # BLD AUTO: 4.88 10*3/MM3 (ref 1.9–8.1)
NEUTROPHILS NFR BLD AUTO: 62.1 % (ref 42.7–76)
PLATELET # BLD AUTO: 166 10*3/MM3 (ref 140–500)
POTASSIUM SERPL-SCNC: 4.2 MMOL/L (ref 3.5–5.2)
PROT SERPL-MCNC: 6.8 G/DL (ref 6–8.5)
RBC # BLD AUTO: 4.1 10*6/MM3 (ref 4.6–6)
SODIUM SERPL-SCNC: 144 MMOL/L (ref 136–145)
T3RU NFR SERPL: 29 % (ref 24–39)
T4 SERPL-MCNC: 6.8 UG/DL (ref 4.5–12)
TRIGL SERPL-MCNC: 52 MG/DL (ref 0–150)
TSH SERPL DL<=0.005 MIU/L-ACNC: 2.44 UIU/ML (ref 0.45–4.5)
VLDLC SERPL CALC-MCNC: 10.4 MG/DL (ref 5–40)
WBC # BLD AUTO: 7.86 10*3/MM3 (ref 4.5–10.7)

## 2018-09-17 NOTE — PROGRESS NOTES
Please inform the patient of the following abnormal results.  Sugars are low, patient can stop glipizide.

## 2018-09-19 ENCOUNTER — TELEPHONE (OUTPATIENT)
Dept: INTERNAL MEDICINE | Facility: CLINIC | Age: 78
End: 2018-09-19

## 2018-09-19 NOTE — TELEPHONE ENCOUNTER
----- Message from Chris Dahl MD sent at 9/17/2018  9:34 AM EDT -----  Please inform the patient of the following abnormal results.  Sugars are low, patient can stop glipizide.

## 2018-09-27 ENCOUNTER — TELEPHONE (OUTPATIENT)
Dept: INTERNAL MEDICINE | Facility: CLINIC | Age: 78
End: 2018-09-27

## 2018-09-27 RX ORDER — GLIMEPIRIDE 1 MG/1
1 TABLET ORAL
Qty: 90 TABLET | Refills: 1 | Status: SHIPPED | OUTPATIENT
Start: 2018-09-27 | End: 2019-02-18 | Stop reason: SDUPTHER

## 2018-09-27 NOTE — TELEPHONE ENCOUNTER
Patient's wife called stating that patient was taken off of glipizide by Dr. Dahl on 9/19/18. Patient's wife stated that patient's sugars have been creeping up and his FBS is averaging about 148. Per Dr. Dahl have patient start glimepiride 1 mg daily. Patient's wife notified and voiced understanding. Prescription sent to [facility].

## 2018-10-08 ENCOUNTER — EPISODE CHANGES (OUTPATIENT)
Dept: CASE MANAGEMENT | Facility: OTHER | Age: 78
End: 2018-10-08

## 2018-11-08 NOTE — TELEPHONE ENCOUNTER
Patient called to report that he is currently taking Gabapentin 100 mg 2 caps BID but it is no longer helping the back and hip pain. The patient would like to increase the dosage. Please advise.

## 2018-11-09 RX ORDER — GABAPENTIN 300 MG/1
300 CAPSULE ORAL 2 TIMES DAILY
Qty: 60 CAPSULE | Refills: 3 | Status: SHIPPED | OUTPATIENT
Start: 2018-11-09 | End: 2018-12-10 | Stop reason: DRUGHIGH

## 2018-11-13 RX ORDER — LEVOTHYROXINE SODIUM 0.07 MG/1
TABLET ORAL
Qty: 90 TABLET | Refills: 1 | Status: SHIPPED | OUTPATIENT
Start: 2018-11-13 | End: 2019-03-29 | Stop reason: SDUPTHER

## 2018-11-13 RX ORDER — ATORVASTATIN CALCIUM 80 MG/1
TABLET, FILM COATED ORAL
Qty: 90 TABLET | Refills: 1 | Status: ON HOLD | OUTPATIENT
Start: 2018-11-13 | End: 2021-01-01

## 2018-11-26 ENCOUNTER — TELEPHONE (OUTPATIENT)
Dept: NEUROSURGERY | Facility: CLINIC | Age: 78
End: 2018-11-26

## 2018-12-10 RX ORDER — GABAPENTIN 300 MG/1
300 CAPSULE ORAL 3 TIMES DAILY
Qty: 270 CAPSULE | Refills: 3 | Status: SHIPPED | OUTPATIENT
Start: 2018-12-10 | End: 2019-01-14 | Stop reason: SDUPTHER

## 2018-12-10 NOTE — TELEPHONE ENCOUNTER
Patient called his Gabapentin was increased to 3 times daily and is almost out will need new RX called into [facility] #[phone]

## 2018-12-11 NOTE — PROGRESS NOTES
Subjective   Patient ID: Anthony Hernandez is a 78 y.o. male is here today for follow-up. Patient is having increased left hip, left lateral and anterior thigh pain since 11.26.18.  Patient was instructed to increase his Gabapentin to 300 mg TID at that time and follow up today.  Increasing the Gabapentin has helped some.  Patient is currently having intermittent low back pain and left lateral thigh, calf and buttock pain.  Patient denies weakness or N/T.    Back Pain   The problem occurs intermittently. The pain is present in the lumbar spine. The pain is at a severity of 10/10. The symptoms are aggravated by bending, lying down, sitting, standing and twisting. Associated symptoms include leg pain. Pertinent negatives include no bladder incontinence, bowel incontinence, numbness, tingling or weakness.   Leg Pain    The pain is present in the left leg. The pain is at a severity of 10/10. Pertinent negatives include no numbness or tingling.       The following portions of the patient's history were reviewed and updated as appropriate: allergies, current medications, past family history, past medical history, past social history, past surgical history and problem list.    Review of Systems   Gastrointestinal: Negative for bowel incontinence.   Genitourinary: Negative for bladder incontinence.   Musculoskeletal: Positive for arthralgias and back pain. Negative for gait problem.   Neurological: Negative for tingling, weakness and numbness.   All other systems reviewed and are negative.    He is with his wife and son. They came back a little earlier. He has been having more left buttock and leg pain. It has been worse than last time. We had increased the gabapentin as high up as 300 mg t.i.d. and it helps a bit, and he seems to be getting used to it but it is still bothersome. Not a lot of back pain and the right leg is fine. The pain is worse when he stands or walks and it seems to be fine when he sits or lies down. Will add  a Medrol Dosepak but also will take a look at this again and get a lumbar MRI looking for left-sided root compression. Something may have changed since the myelogram a year ago.       Objective   Physical Exam   Constitutional: He is oriented to person, place, and time. He appears well-developed and well-nourished.   HENT:   Head: Normocephalic and atraumatic.   Eyes: Conjunctivae and EOM are normal. Pupils are equal, round, and reactive to light.   Fundoscopic exam:       The right eye shows no papilledema. The right eye shows venous pulsations.        The left eye shows no papilledema. The left eye shows venous pulsations.   Neck: Carotid bruit is not present.   Neurological: He is oriented to person, place, and time. He has a normal Finger-Nose-Finger Test and a normal Heel to Shin Test. Gait normal.   Reflex Scores:       Tricep reflexes are 2+ on the right side and 2+ on the left side.       Bicep reflexes are 2+ on the right side and 2+ on the left side.       Brachioradialis reflexes are 2+ on the right side and 2+ on the left side.       Patellar reflexes are 2+ on the right side and 2+ on the left side.       Achilles reflexes are 2+ on the right side and 2+ on the left side.  Psychiatric: His speech is normal.     Neurologic Exam     Mental Status   Oriented to person, place, and time.   Registration of memory: Good recent and remote memory.   Attention: normal. Concentration: normal.   Speech: speech is normal   Level of consciousness: alert  Knowledge: consistent with education.     Cranial Nerves     CN II   Visual fields full to confrontation.   Visual acuity: normal    CN III, IV, VI   Pupils are equal, round, and reactive to light.  Extraocular motions are normal.     CN V   Facial sensation intact.   Right corneal reflex: normal  Left corneal reflex: normal    CN VII   Facial expression full, symmetric.   Right facial weakness: none  Left facial weakness: none    CN VIII   Hearing: intact    CN IX,  X   Palate: symmetric    CN XI   Right sternocleidomastoid strength: normal  Left sternocleidomastoid strength: normal    CN XII   Tongue: not atrophic  Tongue deviation: none    Motor Exam   Muscle bulk: normal  Right arm tone: normal  Left arm tone: normal  Right leg tone: normal  Left leg tone: normal    Strength   Strength 5/5 except as noted.     Sensory Exam   Light touch normal.     Gait, Coordination, and Reflexes     Gait  Gait: normal    Coordination   Finger to nose coordination: normal  Heel to shin coordination: normal    Reflexes   Right brachioradialis: 2+  Left brachioradialis: 2+  Right biceps: 2+  Left biceps: 2+  Right triceps: 2+  Left triceps: 2+  Right patellar: 2+  Left patellar: 2+  Right achilles: 2+  Left achilles: 2+  Right : 2+  Left : 2+      Assessment/Plan   Independent Review of Radiographic Studies:    I reviewed the myelogram done on 12/13/17 which showed postoperative changes at L3-L4 and L4-L5 but no evidence of any recurrent stenosis or herniated disc.  Agree with the report.      Medical Decision Making:    I think we need to recheck so we'll get the MRI looking for left-sided root compression.  We'll try Medrol Dosepak.  He will stay on his gabapentin at 3 mg 3 times a day.  We'll get some flexion extension x-rays also have him come back to discuss that with me in a few weeks.      Anthony was seen today for back pain and leg pain.    Diagnoses and all orders for this visit:    DDD (degenerative disc disease), lumbar  -     MRI Lumbar Spine With & Without Contrast; Future  -     XR Spine Lumbar Complete With Flex & Ext; Future    Lumbar radiculopathy  -     MRI Lumbar Spine With & Without Contrast; Future  -     XR Spine Lumbar Complete With Flex & Ext; Future    Spinal stenosis of lumbar region with neurogenic claudication  -     MRI Lumbar Spine With & Without Contrast; Future  -     XR Spine Lumbar Complete With Flex & Ext; Future    Other orders  -      MethylPREDNISolone (MEDROL, HERMAN,) 4 MG tablet; Take as directed on package instructions.      Return in about 2 weeks (around 12/26/2018) for After tests.

## 2018-12-12 ENCOUNTER — OFFICE VISIT (OUTPATIENT)
Dept: NEUROSURGERY | Facility: CLINIC | Age: 78
End: 2018-12-12

## 2018-12-12 VITALS
WEIGHT: 165 LBS | SYSTOLIC BLOOD PRESSURE: 140 MMHG | DIASTOLIC BLOOD PRESSURE: 78 MMHG | BODY MASS INDEX: 22.35 KG/M2 | HEART RATE: 68 BPM | HEIGHT: 72 IN

## 2018-12-12 DIAGNOSIS — M51.36 DDD (DEGENERATIVE DISC DISEASE), LUMBAR: Primary | ICD-10-CM

## 2018-12-12 DIAGNOSIS — M54.16 LUMBAR RADICULOPATHY: ICD-10-CM

## 2018-12-12 DIAGNOSIS — M48.062 SPINAL STENOSIS OF LUMBAR REGION WITH NEUROGENIC CLAUDICATION: ICD-10-CM

## 2018-12-12 PROCEDURE — 99214 OFFICE O/P EST MOD 30 MIN: CPT | Performed by: NEUROLOGICAL SURGERY

## 2018-12-12 RX ORDER — METHYLPREDNISOLONE 4 MG/1
TABLET ORAL
Qty: 21 TABLET | Refills: 0 | Status: SHIPPED | OUTPATIENT
Start: 2018-12-12 | End: 2019-01-14

## 2018-12-12 RX ORDER — GABAPENTIN 300 MG/1
300 CAPSULE ORAL 3 TIMES DAILY
Qty: 40 CAPSULE | Refills: 0 | Status: SHIPPED | OUTPATIENT
Start: 2018-12-12 | End: 2019-04-15 | Stop reason: SDUPTHER

## 2018-12-24 ENCOUNTER — HOSPITAL ENCOUNTER (OUTPATIENT)
Dept: GENERAL RADIOLOGY | Facility: HOSPITAL | Age: 78
Discharge: HOME | End: 2018-12-24
Attending: NEUROLOGICAL SURGERY

## 2018-12-24 ENCOUNTER — HOSPITAL ENCOUNTER (OUTPATIENT)
Dept: MRI IMAGING | Facility: HOSPITAL | Age: 78
Discharge: HOME | End: 2018-12-24
Attending: NEUROLOGICAL SURGERY | Admitting: NEUROLOGICAL SURGERY

## 2018-12-24 DIAGNOSIS — M51.36 DDD (DEGENERATIVE DISC DISEASE), LUMBAR: ICD-10-CM

## 2018-12-24 DIAGNOSIS — M48.062 SPINAL STENOSIS OF LUMBAR REGION WITH NEUROGENIC CLAUDICATION: ICD-10-CM

## 2018-12-24 DIAGNOSIS — M54.16 LUMBAR RADICULOPATHY: ICD-10-CM

## 2018-12-24 PROCEDURE — 72158 MRI LUMBAR SPINE W/O & W/DYE: CPT

## 2018-12-24 PROCEDURE — 0 GADOBENATE DIMEGLUMINE 529 MG/ML SOLUTION: Performed by: NEUROLOGICAL SURGERY

## 2018-12-24 PROCEDURE — A9577 INJ MULTIHANCE: HCPCS | Performed by: NEUROLOGICAL SURGERY

## 2018-12-24 PROCEDURE — 82565 ASSAY OF CREATININE: CPT

## 2018-12-24 PROCEDURE — 72114 X-RAY EXAM L-S SPINE BENDING: CPT

## 2018-12-24 RX ADMIN — GADOBENATE DIMEGLUMINE 15 ML: 529 INJECTION, SOLUTION INTRAVENOUS at 12:41

## 2018-12-26 RX ORDER — METHYLPREDNISOLONE 4 MG/1
TABLET ORAL
Qty: 21 TABLET | Refills: 0 | OUTPATIENT
Start: 2018-12-26

## 2018-12-26 NOTE — TELEPHONE ENCOUNTER
Per patient's wife, he has completed the MDP and it has helped tremendously, she is not sure why pharmacy sent a refill request.      Rejection notice sent to pharmacy

## 2019-01-01 LAB — CREAT BLDA-MCNC: 1.1 MG/DL (ref 0.6–1.3)

## 2019-01-11 NOTE — PROGRESS NOTES
Subjective   Patient ID: Anthony Hernandez is a 78 y.o. male is here today for follow-up with new lumbar MRI and plain films.  Patient was last seen 12.12.18 intermittent low back pain and left lateral thigh pain, calf pain and buttock pain.  He was prescribed MDP and is taking Gabapentin 300mg TID.  Patient states that is left leg pain has worsened since his last visit here.  He now also has left leg weakness.  He is still taking Gabapentin 300mg TID and is also taking Vimovo ( NSAID) BID that was prescribed by another physician 3 months ago ( he forgot to mention at last appt), it worked at first and now is not as effective.    Back Pain   The problem occurs constantly. The problem is unchanged. The pain radiates to the left thigh. The pain is at a severity of 8/10. Associated symptoms include leg pain, numbness and weakness. Pertinent negatives include no tingling.   Leg Pain    The pain is present in the left leg. The pain is at a severity of 7/10. The pain is moderate. Associated symptoms include muscle weakness and numbness. Pertinent negatives include no tingling.   Extremity Weakness    The pain is present in the left lower leg and left upper leg. Associated symptoms include numbness. Pertinent negatives include no tingling.       The following portions of the patient's history were reviewed and updated as appropriate: allergies, current medications, past family history, past medical history, past social history, past surgical history and problem list.    Review of Systems   Musculoskeletal: Positive for arthralgias, back pain, extremity weakness and gait problem.   Neurological: Positive for weakness and numbness. Negative for tingling.   All other systems reviewed and are negative.    He is with his wife and son.  The steroids helped while he was on them, but the pain has come back.  We discussed his MRI which shows evidence of a recurrent disc herniation.  I think at L3-L4, although the radiologists say L4-L5.   He still continues to have severe left buttock and leg pain.  If we were to do surgery, he would probably need to be decompressed and fused from L3 to L5.  He has got a number of health problems including dementia, and I would like to avoid that.  So will try blocks and see if that helps.  He continues to take gabapentin.                    Objective   Physical Exam   Constitutional: He is oriented to person, place, and time. He appears well-developed and well-nourished.   HENT:   Head: Normocephalic and atraumatic.   Eyes: Conjunctivae and EOM are normal. Pupils are equal, round, and reactive to light.   Fundoscopic exam:       The right eye shows no papilledema. The right eye shows venous pulsations.        The left eye shows no papilledema. The left eye shows venous pulsations.   Neck: Carotid bruit is not present.   Neurological: He is oriented to person, place, and time. He has a normal Finger-Nose-Finger Test and a normal Heel to Shin Test. Gait normal.   Reflex Scores:       Tricep reflexes are 2+ on the right side and 2+ on the left side.       Bicep reflexes are 2+ on the right side and 2+ on the left side.       Brachioradialis reflexes are 2+ on the right side and 2+ on the left side.       Patellar reflexes are 2+ on the right side and 2+ on the left side.       Achilles reflexes are 2+ on the right side and 2+ on the left side.  Psychiatric: His speech is normal.     Neurologic Exam     Mental Status   Oriented to person, place, and time.   Registration of memory: Good recent and remote memory.   Attention: normal. Concentration: normal.   Speech: speech is normal   Level of consciousness: alert  Knowledge: consistent with education.     Cranial Nerves     CN II   Visual fields full to confrontation.   Visual acuity: normal    CN III, IV, VI   Pupils are equal, round, and reactive to light.  Extraocular motions are normal.     CN V   Facial sensation intact.   Right corneal reflex: normal  Left corneal  reflex: normal    CN VII   Facial expression full, symmetric.   Right facial weakness: none  Left facial weakness: none    CN VIII   Hearing: intact    CN IX, X   Palate: symmetric    CN XI   Right sternocleidomastoid strength: normal  Left sternocleidomastoid strength: normal    CN XII   Tongue: not atrophic  Tongue deviation: none    Motor Exam   Muscle bulk: normal  Right arm tone: normal  Left arm tone: normal  Right leg tone: normal  Left leg tone: normal    Strength   Strength 5/5 except as noted.     Sensory Exam   Light touch normal.     Gait, Coordination, and Reflexes     Gait  Gait: normal    Coordination   Finger to nose coordination: normal  Heel to shin coordination: normal    Reflexes   Right brachioradialis: 2+  Left brachioradialis: 2+  Right biceps: 2+  Left biceps: 2+  Right triceps: 2+  Left triceps: 2+  Right patellar: 2+  Left patellar: 2+  Right achilles: 2+  Left achilles: 2+  Right : 2+  Left : 2+      Assessment/Plan   Independent Review of Radiographic Studies:    The recent MRI done 12/20/18 shows some degree of recurrence of nerve compression on the left at L3-L4 it seems mostly because of a herniated disc.  The spinal canal otherwise looks fairly capacious.  There is no significant spondylolisthesis.  Agree with the reports.      Medical Decision Making:    We'll try to avoid surgery because it would probably involve a decompression and fusion at L3-L4 and L4-L5.  We'll try an epidural block.  He has to be off his aspirin and come back in 3 months.  If he gets a lot of benefit a block conservatively blocks since Medicare only allows for 4 per year.    I'll see him in 3 months.      Anthony was seen today for back pain, leg pain and extremity weakness.    Diagnoses and all orders for this visit:    Spinal stenosis of lumbar region with neurogenic claudication  -     Epidural Block    Herniation of lumbar intervertebral disc with radiculopathy  -     Epidural Block      Return in  about 3 months (around 4/14/2019).

## 2019-01-14 ENCOUNTER — OFFICE VISIT (OUTPATIENT)
Dept: NEUROSURGERY | Facility: CLINIC | Age: 79
End: 2019-01-14

## 2019-01-14 VITALS — BODY MASS INDEX: 21.94 KG/M2 | WEIGHT: 162 LBS | HEIGHT: 72 IN

## 2019-01-14 DIAGNOSIS — M51.16 HERNIATION OF LUMBAR INTERVERTEBRAL DISC WITH RADICULOPATHY: ICD-10-CM

## 2019-01-14 DIAGNOSIS — M48.062 SPINAL STENOSIS OF LUMBAR REGION WITH NEUROGENIC CLAUDICATION: Primary | ICD-10-CM

## 2019-01-14 PROCEDURE — 99214 OFFICE O/P EST MOD 30 MIN: CPT | Performed by: NEUROLOGICAL SURGERY

## 2019-01-23 ENCOUNTER — ANESTHESIA EVENT (OUTPATIENT)
Dept: PAIN MEDICINE | Facility: HOSPITAL | Age: 79
End: 2019-01-23

## 2019-01-23 ENCOUNTER — HOSPITAL ENCOUNTER (OUTPATIENT)
Dept: GENERAL RADIOLOGY | Facility: HOSPITAL | Age: 79
Discharge: HOME | End: 2019-01-23

## 2019-01-23 ENCOUNTER — ANESTHESIA (OUTPATIENT)
Dept: PAIN MEDICINE | Facility: HOSPITAL | Age: 79
End: 2019-01-23

## 2019-01-23 ENCOUNTER — HOSPITAL ENCOUNTER (OUTPATIENT)
Dept: PAIN MEDICINE | Facility: HOSPITAL | Age: 79
Discharge: HOME | End: 2019-01-23
Attending: NEUROLOGICAL SURGERY | Admitting: ANESTHESIOLOGY

## 2019-01-23 VITALS
TEMPERATURE: 98.8 F | DIASTOLIC BLOOD PRESSURE: 74 MMHG | HEIGHT: 72 IN | SYSTOLIC BLOOD PRESSURE: 146 MMHG | OXYGEN SATURATION: 98 % | RESPIRATION RATE: 16 BRPM | HEART RATE: 72 BPM | BODY MASS INDEX: 23.03 KG/M2 | WEIGHT: 170 LBS

## 2019-01-23 DIAGNOSIS — M51.36 DDD (DEGENERATIVE DISC DISEASE), LUMBAR: ICD-10-CM

## 2019-01-23 DIAGNOSIS — R52 PAIN: ICD-10-CM

## 2019-01-23 DIAGNOSIS — M54.16 LUMBAR RADICULOPATHY: Primary | ICD-10-CM

## 2019-01-23 LAB — GLUCOSE BLDC GLUCOMTR-MCNC: 139 MG/DL (ref 70–130)

## 2019-01-23 PROCEDURE — 0 IOPAMIDOL 41 % SOLUTION: Performed by: ANESTHESIOLOGY

## 2019-01-23 PROCEDURE — C1755 CATHETER, INTRASPINAL: HCPCS

## 2019-01-23 PROCEDURE — 25010000002 METHYLPREDNISOLONE PER 80 MG: Performed by: ANESTHESIOLOGY

## 2019-01-23 PROCEDURE — 82962 GLUCOSE BLOOD TEST: CPT

## 2019-01-23 PROCEDURE — 77003 FLUOROGUIDE FOR SPINE INJECT: CPT

## 2019-01-23 RX ORDER — SODIUM CHLORIDE 0.9 % (FLUSH) 0.9 %
1-10 SYRINGE (ML) INJECTION AS NEEDED
Status: DISCONTINUED | OUTPATIENT
Start: 2019-01-23 | End: 2019-01-24 | Stop reason: HOSPADM

## 2019-01-23 RX ORDER — FENTANYL CITRATE 50 UG/ML
50 INJECTION, SOLUTION INTRAMUSCULAR; INTRAVENOUS AS NEEDED
Status: DISCONTINUED | OUTPATIENT
Start: 2019-01-23 | End: 2019-01-24 | Stop reason: HOSPADM

## 2019-01-23 RX ORDER — METHYLPREDNISOLONE ACETATE 80 MG/ML
80 INJECTION, SUSPENSION INTRA-ARTICULAR; INTRALESIONAL; INTRAMUSCULAR; SOFT TISSUE ONCE
Status: COMPLETED | OUTPATIENT
Start: 2019-01-23 | End: 2019-01-23

## 2019-01-23 RX ORDER — LIDOCAINE HYDROCHLORIDE 10 MG/ML
1 INJECTION, SOLUTION INFILTRATION; PERINEURAL ONCE AS NEEDED
Status: DISCONTINUED | OUTPATIENT
Start: 2019-01-23 | End: 2019-01-24 | Stop reason: HOSPADM

## 2019-01-23 RX ADMIN — IOPAMIDOL 3 ML: 408 INJECTION, SOLUTION INTRATHECAL at 11:53

## 2019-01-23 RX ADMIN — METHYLPREDNISOLONE ACETATE 80 MG: 80 INJECTION, SUSPENSION INTRA-ARTICULAR; INTRALESIONAL; INTRAMUSCULAR; SOFT TISSUE at 11:53

## 2019-01-23 NOTE — ANESTHESIA PROCEDURE NOTES
PAIN Epidural block    Pre-sedation assessment completed: 1/23/2019 11:48 AM    Patient reassessed immediately prior to procedure    Patient location during procedure: pain clinic  Start Time: 1/23/2019 11:48 AM  Stop Time: 1/23/2019 11:58 AM  Indication:procedure for pain  Performed By  Anesthesiologist: Rekha Reyes MD  Preanesthetic Checklist  Completed: patient identified, site marked, surgical consent, pre-op evaluation, timeout performed, IV checked, risks and benefits discussed and monitors and equipment checked  Additional Notes  Lumbar spinal stenosis, radiculopathy  Fluoro used  Prep:  Pt Position:prone  Sterile Tech:cap, gloves, mask and sterile barrier  Prep:chlorhexidine gluconate and isopropyl alcohol  Monitoring:blood pressure monitoring, continuous pulse oximetry and EKG  Procedure:  Sedation: no   Approach:midline  Guidance: fluoroscopy  Location:lumbar  Level:5-6  Needle Type:Tuohy  Needle Gauge:20  Aspiration:negative  Medications:  Depomedrol:80  Preservative Free Saline:3mL  Isovue:3mL  Comments:B/l spread  Post Assessment:  Dressing:occlusive dressing applied  Pt Tolerance:patient tolerated the procedure well with no apparent complications  Complications:no

## 2019-01-23 NOTE — H&P
"[facility]    History and Physical    Patient Name: Anthony Hernandez  :  1940  MRN:  [id number]  Date of Admission: 2019    Subjective     Patient is a 78 y.o. male presents with chief complaint of chronic, moderate, severe low back and leg: left pain.  Onset of symptoms was gradual starting several years ago.  Symptoms are associated/aggravated by nothing in particular. Symptoms improve with nothing    The following portions of the patients history were reviewed and updated as appropriate: current medications, allergies, past medical history, past surgical history, past family history, past social history and problem list                Objective     Past Medical History:   Past Medical History:   Diagnosis Date   • Acute myocardial infarction (CMS/HCC)    • Anesthesia complication     DIFFICULTY COMING OUT OF SEDATION   • Aortic valve sclerosis    • Coronary artery disease     History of remote anterior wall myocardial infarction in .   • Diabetes mellitus (CMS/HCC)    • Disease of thyroid gland    • Erectile dysfunction    • Fall     fell and was seen in the ED, \"his leg gave out on him\"   • Hearing difficulty of both ears     Normally wears hearing aids   • Heart attack (CMS/HCC)    • Heart attack (CMS/HCC)    • Heart murmur    • Hyperlipidemia    • Intermittent claudication (CMS/HCC)    • Osteoarthritis    • Peripheral vascular disease (CMS/HCC)    • Skin cancer    • Sleep apnea     cpap, can't use due to drooling   • Spinal stenosis      Past Surgical History:   Past Surgical History:   Procedure Laterality Date   • ATHERECTOMY Left     BRANT FEMORAL-POPLITEAL INITIAL STENOSIS WITH ATHERECTOMY AND STENT LEFT   • COLONOSCOPY     • COLONOSCOPY W/ POLYPECTOMY      BENIGN   • CORONARY ANGIOPLASTY WITH STENT PLACEMENT     • CORONARY ARTERY BYPASS GRAFT  2010    cabg x 3: LIMA to LAD, vein graft to RCA and OM1   • LUMBAR LAMINECTOMY DISCECTOMY DECOMPRESSION Bilateral 2017    Procedure: L3/4, " L4/5 OPEN LUMBAR DECOMPRESSION POSTERIOR 1-2 LEVELS;  Surgeon: Ranjeet Contreras MD;  Location: [facility] OR;  Service:    • SPINE SURGERY     • THROMBOENDARTERECTOMY Left     NEUROLOGICAL SURGERY CAROTID ENDARTERECTOMY LEFT     Family History:   Family History   Problem Relation Age of Onset   • Breast cancer Mother    • Cancer Mother    • Brain cancer Sister    • Other Brother         CABG   • Stroke Brother    • Heart disease Brother    • Heart attack Brother    • Malig Hyperthermia Neg Hx      Social History:   Social History     Tobacco Use   • Smoking status: Former Smoker     Packs/day: 1.00     Years: 20.00     Pack years: 20.00     Last attempt to quit:      Years since quittin.0   • Smokeless tobacco: Never Used   Substance Use Topics   • Alcohol use: No   • Drug use: No       Vital Signs Range for the last 24 hours  Temperature:     Temp Source:     BP: BP: ()/()    Pulse:     Respirations:     SPO2:     O2 Amount (l/min):     O2 Devices     Weight:           --------------------------------------------------------------------------------    Current Outpatient Medications   Medication Sig Dispense Refill   • aspirin  MG tablet Take 1 tablet by mouth Daily.     • atorvastatin (LIPITOR) 80 MG tablet TAKE 1 TABLET EVERY DAY 90 tablet 1   • Ferrous Sulfate 27 MG tablet Take 27 mg by mouth Every Night.     • gabapentin (NEURONTIN) 300 MG capsule Take 1 capsule by mouth 3 (Three) Times a Day. 40 capsule 0   • glimepiride (AMARYL) 1 MG tablet Take 1 tablet by mouth Every Morning Before Breakfast. 90 tablet 1   • Lancets (ONETOUCH ULTRASOFT) lancets Daily fasting glucose 100 each 12   • levothyroxine (SYNTHROID, LEVOTHROID) 75 MCG tablet TAKE 1 TABLET EVERY DAY 90 tablet 1   • memantine (NAMENDA) 10 MG tablet Take 5 mg by mouth 2 (Two) Times a Day.     • Naproxen-Esomeprazole (VIMOVO) 500-20 MG tablet delayed-release Take  by mouth.     • Nystatin (MAGIC MOUTHWASH) Swish and swallow 5 mL 3  (Three) Times a Day. 120 mL 0   • ONE TOUCH ULTRA TEST test strip 1 each by Other route Daily. 100 each 5     No current facility-administered medications for this encounter.        --------------------------------------------------------------------------------  Assessment/Plan      Anesthesia Evaluation     Patient summary reviewed and Nursing notes reviewed                Airway   Mallampati: II  TM distance: >3 FB  Dental - normal exam     Pulmonary - normal exam   (+) sleep apnea,   Cardiovascular - normal exam    (+) hypertension, valvular problems/murmurs AS, past MI , CAD, CABG, PVD, hyperlipidemia,  carotid artery disease      Neuro/Psych  (+) headaches, numbness, psychiatric history, dementia,   left straight leg raise test,     GI/Hepatic/Renal/Endo    (+)   diabetes mellitus, hypothyroidism,     Musculoskeletal (-) normal exam    Abdominal  - normal exam   Substance History - negative use     OB/GYN negative ob/gyn ROS         Other   (+) arthritis   history of cancer               Diagnosis and Plan    Treatment Plan  ASA 3      Procedures: Lumbar Epidural Steroid Injection(LESI), With fluoroscopy,       Anesthetic plan and risks discussed with patient and spouse.          Diagnosis     * Lumbar spinal stenosis [M48.061]     * Lumbar radiculopathy [M54.16]

## 2019-02-19 RX ORDER — GLIMEPIRIDE 1 MG/1
TABLET ORAL
Qty: 90 TABLET | Refills: 1 | Status: SHIPPED | OUTPATIENT
Start: 2019-02-19 | End: 2019-04-15

## 2019-03-05 ENCOUNTER — OFFICE VISIT (OUTPATIENT)
Dept: INTERNAL MEDICINE | Facility: CLINIC | Age: 79
End: 2019-03-05

## 2019-03-05 VITALS
HEIGHT: 72 IN | DIASTOLIC BLOOD PRESSURE: 74 MMHG | SYSTOLIC BLOOD PRESSURE: 130 MMHG | OXYGEN SATURATION: 98 % | HEART RATE: 70 BPM | BODY MASS INDEX: 24.06 KG/M2 | WEIGHT: 177.6 LBS

## 2019-03-05 DIAGNOSIS — E11.9 DIABETES MELLITUS TYPE 2, NONINSULIN DEPENDENT (HCC): Primary | ICD-10-CM

## 2019-03-05 DIAGNOSIS — R19.6 HALITOSIS: ICD-10-CM

## 2019-03-05 DIAGNOSIS — E03.9 HYPOTHYROIDISM, UNSPECIFIED TYPE: ICD-10-CM

## 2019-03-05 PROCEDURE — 99214 OFFICE O/P EST MOD 30 MIN: CPT | Performed by: FAMILY MEDICINE

## 2019-03-05 RX ORDER — LOTEPREDNOL ETABONATE 10 MG/ML
SUSPENSION TOPICAL
Refills: 1 | COMMUNITY
Start: 2019-02-26 | End: 2019-07-10

## 2019-03-05 RX ORDER — BESIFLOXACIN 6 MG/ML
SUSPENSION OPHTHALMIC
Refills: 1 | COMMUNITY
Start: 2019-02-26 | End: 2019-07-10

## 2019-03-05 RX ORDER — NEPAFENAC 0.3 %
SUSPENSION, DROPS(FINAL DOSAGE FORM)(ML) OPHTHALMIC (EYE)
Refills: 1 | COMMUNITY
Start: 2019-02-26 | End: 2019-07-10

## 2019-03-05 NOTE — PROGRESS NOTES
Subjective   Anthony Hernandez is a 79 y.o. male.     Chief Complaint   Patient presents with   • B/L Ear Fullness   • Diabetes     needs diabetic foot exam   • Bad Breath     x6-9 months         History of Present Illness     Patient presents today with type 2 diabetes.  Patient is currently taking glimepiride 1 mg daily.  Patient denies any side effects of the medication.    Patient also has halitosis been going on for 6-9 months.  Patients wife states that she is concerned about this.  They have tried many over-the-counter medications.    Also has hypothyroidism.  Patient is currently on levothyroxine 75 mg daily.  Patient denies any side effects of the medication.    The following portions of the patient's history were reviewed and updated as appropriate: allergies, current medications, past family history, past medical history, past social history, past surgical history and problem list.    Review of Systems   Constitutional: Negative for chills and fever.   HENT: Negative for congestion, rhinorrhea, sinus pain and sore throat.    Eyes: Negative for photophobia and visual disturbance.   Respiratory: Negative for cough, chest tightness and shortness of breath.    Cardiovascular: Negative for chest pain and palpitations.   Gastrointestinal: Negative for diarrhea, nausea and vomiting.   Genitourinary: Negative for dysuria, frequency and urgency.   Skin: Negative for rash and wound.   Neurological: Negative for dizziness and syncope.   Psychiatric/Behavioral: Negative for behavioral problems and confusion.       Objective   Physical Exam   Constitutional: He is oriented to person, place, and time. He appears well-developed and well-nourished.   HENT:   Head: Normocephalic and atraumatic.   Right Ear: External ear normal.   Left Ear: External ear normal.   Mouth/Throat: Oropharynx is clear and moist.   Eyes: EOM are normal.   Neck: Normal range of motion. Neck supple.   Cardiovascular: Normal rate, regular rhythm and  normal heart sounds.   Pulmonary/Chest: Effort normal and breath sounds normal. No respiratory distress.   Musculoskeletal: Normal range of motion.    Anthony had a diabetic foot exam performed today.   During the foot exam he had a monofilament test performed.    Neurological Sensory Findings - Unaltered hot/cold right ankle/foot discrimination and unaltered hot/cold left ankle/foot discrimination. Unaltered sharp/dull right ankle/foot discrimination and unaltered sharp/dull left ankle/foot discrimination. No right ankle/foot altered proprioception and no left ankle/foot altered proprioception  Vascular Status -  His right foot exhibits normal foot vasculature  and no edema. His left foot exhibits normal foot vasculature  and no edema.  Skin Integrity  -  His right foot skin is intact.His left foot skin is intact..  Lymphadenopathy:     He has no cervical adenopathy.   Neurological: He is alert and oriented to person, place, and time.   Skin: Skin is warm.   Psychiatric: He has a normal mood and affect. His behavior is normal.   Nursing note and vitals reviewed.      Assessment/Plan   Anthony was seen today for b/l ear fullness, diabetes and bad breath.    Diagnoses and all orders for this visit:    Diabetes mellitus type 2, noninsulin dependent (CMS/HCC)  -     Comprehensive Metabolic Panel  -     Hemoglobin A1c  -     We will continue patient on current medications at this time.    Hypothyroidism, unspecified type  -     Thyroid Panel With TSH  -     We will continue levothyroxine 75 mcg daily.    Halitosis        -     Discussed with patient to follow-up with dentist.    Other orders  -     SCANNED - FOOT EXAM          No Follow-up on file.    Dictated utilizing Dragon Voice Recognition Software

## 2019-03-06 LAB
ALBUMIN SERPL-MCNC: 4.3 G/DL (ref 3.5–4.8)
ALBUMIN/GLOB SERPL: 2 {RATIO} (ref 1.2–2.2)
ALP SERPL-CCNC: 81 IU/L (ref 39–117)
ALT SERPL-CCNC: 16 IU/L (ref 0–44)
AST SERPL-CCNC: 16 IU/L (ref 0–40)
BILIRUB SERPL-MCNC: 0.3 MG/DL (ref 0–1.2)
BUN SERPL-MCNC: 25 MG/DL (ref 8–27)
BUN/CREAT SERPL: 18 (ref 10–24)
CALCIUM SERPL-MCNC: 10 MG/DL (ref 8.6–10.2)
CHLORIDE SERPL-SCNC: 103 MMOL/L (ref 96–106)
CO2 SERPL-SCNC: 24 MMOL/L (ref 20–29)
CREAT SERPL-MCNC: 1.38 MG/DL (ref 0.76–1.27)
FT4I SERPL CALC-MCNC: 2.4 (ref 1.2–4.9)
GLOBULIN SER CALC-MCNC: 2.2 G/DL (ref 1.5–4.5)
GLUCOSE SERPL-MCNC: 105 MG/DL (ref 65–99)
HBA1C MFR BLD: 6.8 % (ref 4.8–5.6)
POTASSIUM SERPL-SCNC: 4.9 MMOL/L (ref 3.5–5.2)
PROT SERPL-MCNC: 6.5 G/DL (ref 6–8.5)
SODIUM SERPL-SCNC: 144 MMOL/L (ref 134–144)
T3RU NFR SERPL: 31 % (ref 24–39)
T4 SERPL-MCNC: 7.7 UG/DL (ref 4.5–12)
TSH SERPL DL<=0.005 MIU/L-ACNC: 2.55 UIU/ML (ref 0.45–4.5)

## 2019-03-07 NOTE — PROGRESS NOTES
Please inform the patient of the following abnormal results.  Stop vimovo, due to increase serum creatnine. Will check CMP in one month.  Start patient on farxiga 5mg daily.

## 2019-03-12 ENCOUNTER — TELEPHONE (OUTPATIENT)
Dept: INTERNAL MEDICINE | Facility: CLINIC | Age: 79
End: 2019-03-12

## 2019-03-12 DIAGNOSIS — R79.89 ELEVATED SERUM CREATININE: Primary | ICD-10-CM

## 2019-03-12 NOTE — TELEPHONE ENCOUNTER
----- Message from Chris Dahl MD sent at 3/6/2019 10:32 PM EST -----  Please inform the patient of the following abnormal results.  Stop vimovo, due to increase serum creatnine. Will check CMP in one month.  Start patient on farxiga 5mg daily.

## 2019-03-13 ENCOUNTER — RESULTS ENCOUNTER (OUTPATIENT)
Dept: INTERNAL MEDICINE | Facility: CLINIC | Age: 79
End: 2019-03-13

## 2019-03-13 DIAGNOSIS — R79.89 ELEVATED SERUM CREATININE: ICD-10-CM

## 2019-03-13 RX ORDER — TRAMADOL HYDROCHLORIDE 50 MG/1
50 TABLET ORAL DAILY PRN
Qty: 30 TABLET | Refills: 2 | Status: ON HOLD | OUTPATIENT
Start: 2019-03-13 | End: 2019-09-25 | Stop reason: SDUPTHER

## 2019-03-13 NOTE — PROGRESS NOTES
Please inform the patient of the following abnormal results.  We can do tramadol instead 50mg daily.

## 2019-03-13 NOTE — TELEPHONE ENCOUNTER
----- Message from Chris Dahl MD sent at 3/13/2019 10:52 AM EDT -----  Please inform the patient of the following abnormal results.  Yes one daily. #30. May give 2 refills.

## 2019-03-13 NOTE — TELEPHONE ENCOUNTER
Patient's wife notified. Prescription left on voicemail at [facility]. Please sign order.

## 2019-03-13 NOTE — PROGRESS NOTES
Please inform the patient of the following abnormal results.  Yes one daily. #30. May give 2 refills.

## 2019-03-20 ENCOUNTER — TELEPHONE (OUTPATIENT)
Dept: INTERNAL MEDICINE | Facility: CLINIC | Age: 79
End: 2019-03-20

## 2019-03-20 NOTE — TELEPHONE ENCOUNTER
Patient's wife notified and voiced understanding. Patient's wife advised to monitor patient's sugars. Patient's wife advised to contact our office if symptoms persist after stopping medication. Patient's wife voiced understanding.

## 2019-03-20 NOTE — TELEPHONE ENCOUNTER
Patient's wife called stating that since starting Farxiga patient has had problems with making it to the restroom. Patient's wife stated that patient stated that he all of a sudden gets the urge to go and cannot control it. Patient's wife stated that he has actually wet his pants a couple of times. Patient's wife didn't know if this could possibly be related to the medication or to his dementia. Please advise.

## 2019-03-26 ENCOUNTER — TELEPHONE (OUTPATIENT)
Dept: INTERNAL MEDICINE | Facility: CLINIC | Age: 79
End: 2019-03-26

## 2019-03-29 RX ORDER — GABAPENTIN 300 MG/1
300 CAPSULE ORAL 3 TIMES DAILY
Qty: 21 CAPSULE | Refills: 0 | Status: SHIPPED | OUTPATIENT
Start: 2019-03-29 | End: 2019-07-08 | Stop reason: SDUPTHER

## 2019-03-29 RX ORDER — LEVOTHYROXINE SODIUM 0.07 MG/1
TABLET ORAL
Qty: 90 TABLET | Refills: 1 | Status: SHIPPED | OUTPATIENT
Start: 2019-03-29 | End: 2019-08-14 | Stop reason: SDUPTHER

## 2019-03-29 NOTE — TELEPHONE ENCOUNTER
Pt's wife called to say that pt needs a 7 day supply of Gabapentin 300 mg tid sent to their local pharmacy. [facility] pharmacy is sending his regular Rx but he runs out in 2 days and the Rx will not arrive in time.    Pharmacy:  [address]  [phone]

## 2019-04-11 LAB
ALBUMIN SERPL-MCNC: 4.1 G/DL (ref 3.5–5.2)
ALBUMIN/GLOB SERPL: 2 G/DL
ALP SERPL-CCNC: 97 U/L (ref 39–117)
ALT SERPL-CCNC: 15 U/L (ref 1–41)
AST SERPL-CCNC: 14 U/L (ref 1–40)
BILIRUB SERPL-MCNC: 0.5 MG/DL (ref 0.2–1.2)
BUN SERPL-MCNC: 15 MG/DL (ref 8–23)
BUN/CREAT SERPL: 11.7 (ref 7–25)
CALCIUM SERPL-MCNC: 9.3 MG/DL (ref 8.6–10.5)
CHLORIDE SERPL-SCNC: 100 MMOL/L (ref 98–107)
CO2 SERPL-SCNC: 28.4 MMOL/L (ref 22–29)
CREAT SERPL-MCNC: 1.28 MG/DL (ref 0.76–1.27)
GLOBULIN SER CALC-MCNC: 2.1 GM/DL
GLUCOSE SERPL-MCNC: 158 MG/DL (ref 65–99)
POTASSIUM SERPL-SCNC: 4.1 MMOL/L (ref 3.5–5.2)
PROT SERPL-MCNC: 6.2 G/DL (ref 6–8.5)
SODIUM SERPL-SCNC: 140 MMOL/L (ref 136–145)

## 2019-04-11 NOTE — PROGRESS NOTES
Subjective   Patient ID: Anthony Hernandez is a 79 y.o. male is here today for follow-up after CRISSY x 1 which did help. Patient was last seen 1.14.19 for low back and left lateral leg and buttock pain.    Patient is still having intermittent moderate to severe low back and left posterior/anterior/lateral leg pain,left buttock pain with prolonged standing or walking.  No leg weakness or N/T.    He is taking Gabapentin 300 mg TID and Tramadol qd as prescribed by PCP.        Back Pain   The problem occurs intermittently. The problem has been waxing and waning since onset. The pain is present in the lumbar spine and gluteal. The pain is at a severity of 6/10. The pain is moderate. The pain is worse during the day. The symptoms are aggravated by standing, twisting, bending and sitting. Associated symptoms include leg pain. Pertinent negatives include no numbness, tingling or weakness.   Leg Pain    The pain is present in the left leg. The pain is at a severity of 7/10. The pain is moderate. The pain has been intermittent since onset. Pertinent negatives include no numbness or tingling.       The following portions of the patient's history were reviewed and updated as appropriate: allergies, current medications, past family history, past medical history, past social history, past surgical history and problem list.    Review of Systems   Musculoskeletal: Positive for arthralgias and back pain.   Neurological: Negative for tingling, weakness and numbness.   All other systems reviewed and are negative.    It has been about 3 months since of seen him.  He had a block in January and it helped his leg pain.  He still having some behavioral issues related to his Alzheimer's but overall he is better from a pain standpoint.  I think it is far better under the circumstances with his progressive dementia to avoid putting him through any kind of surgery vertically effusion and I would opt for a repeat in the block again 3 months from the  last one which will take as of April and then again in July.  We will do that and have him come back to see me in September.        Objective   Physical Exam   Constitutional: He is oriented to person, place, and time. He appears well-developed and well-nourished.   HENT:   Head: Normocephalic and atraumatic.   Eyes: Conjunctivae and EOM are normal. Pupils are equal, round, and reactive to light.   Fundoscopic exam:       The right eye shows no papilledema. The right eye shows venous pulsations.        The left eye shows no papilledema. The left eye shows venous pulsations.   Neck: Carotid bruit is not present.   Neurological: He is oriented to person, place, and time. He has a normal Finger-Nose-Finger Test and a normal Heel to Shin Test. Gait normal.   Reflex Scores:       Tricep reflexes are 2+ on the right side and 2+ on the left side.       Bicep reflexes are 2+ on the right side and 2+ on the left side.       Brachioradialis reflexes are 2+ on the right side and 2+ on the left side.       Patellar reflexes are 2+ on the right side and 2+ on the left side.       Achilles reflexes are 2+ on the right side and 2+ on the left side.  Psychiatric: His speech is normal.     Neurologic Exam     Mental Status   Oriented to person, place, and time.   Registration of memory: Good recent and remote memory.   Attention: normal. Concentration: normal.   Speech: speech is normal   Level of consciousness: alert  Knowledge: consistent with education.     Cranial Nerves     CN II   Visual fields full to confrontation.   Visual acuity: normal    CN III, IV, VI   Pupils are equal, round, and reactive to light.  Extraocular motions are normal.     CN V   Facial sensation intact.   Right corneal reflex: normal  Left corneal reflex: normal    CN VII   Facial expression full, symmetric.   Right facial weakness: none  Left facial weakness: none    CN VIII   Hearing: intact    CN IX, X   Palate: symmetric    CN XI   Right  sternocleidomastoid strength: normal  Left sternocleidomastoid strength: normal    CN XII   Tongue: not atrophic  Tongue deviation: none    Motor Exam   Muscle bulk: normal  Right arm tone: normal  Left arm tone: normal  Right leg tone: normal  Left leg tone: normal    Strength   Strength 5/5 except as noted.     Sensory Exam   Light touch normal.     Gait, Coordination, and Reflexes     Gait  Gait: normal    Coordination   Finger to nose coordination: normal  Heel to shin coordination: normal    Reflexes   Right brachioradialis: 2+  Left brachioradialis: 2+  Right biceps: 2+  Left biceps: 2+  Right triceps: 2+  Left triceps: 2+  Right patellar: 2+  Left patellar: 2+  Right achilles: 2+  Left achilles: 2+  Right : 2+  Left : 2+      Assessment/Plan   Independent Review of Radiographic Studies:    His MRI done in December 2018 showed some degree of recurrence of nerve compression on the left at L3-L4 as well as a herniated disc..  Agree with the report.  Medical Decision Making:    We will go ahead and get another epidural block in April and then July and then have him come to see me in 5 months in September.  Hopefully this will control his pain and we will not have to subject him to a surgery.      Anthony was seen today for back pain and leg pain.    Diagnoses and all orders for this visit:    Spinal stenosis of lumbar region with neurogenic claudication  -     Epidural Block    DDD (degenerative disc disease), lumbar  -     Epidural Block    Herniation of lumbar intervertebral disc with radiculopathy      Return in about 5 months (around 9/15/2019).

## 2019-04-15 ENCOUNTER — TELEPHONE (OUTPATIENT)
Dept: INTERNAL MEDICINE | Facility: CLINIC | Age: 79
End: 2019-04-15

## 2019-04-15 ENCOUNTER — OFFICE VISIT (OUTPATIENT)
Dept: NEUROSURGERY | Facility: CLINIC | Age: 79
End: 2019-04-15

## 2019-04-15 VITALS
DIASTOLIC BLOOD PRESSURE: 74 MMHG | HEIGHT: 72 IN | WEIGHT: 176 LBS | SYSTOLIC BLOOD PRESSURE: 132 MMHG | BODY MASS INDEX: 23.84 KG/M2 | HEART RATE: 74 BPM

## 2019-04-15 DIAGNOSIS — M48.062 SPINAL STENOSIS OF LUMBAR REGION WITH NEUROGENIC CLAUDICATION: Primary | ICD-10-CM

## 2019-04-15 DIAGNOSIS — M51.36 DDD (DEGENERATIVE DISC DISEASE), LUMBAR: ICD-10-CM

## 2019-04-15 DIAGNOSIS — M51.16 HERNIATION OF LUMBAR INTERVERTEBRAL DISC WITH RADICULOPATHY: ICD-10-CM

## 2019-04-15 PROCEDURE — 99214 OFFICE O/P EST MOD 30 MIN: CPT | Performed by: NEUROLOGICAL SURGERY

## 2019-04-15 NOTE — TELEPHONE ENCOUNTER
----- Message from Chris Dahl MD sent at 4/12/2019  1:00 AM EDT -----  The labs were reviewed. Please inform patient that labs were normal.

## 2019-04-19 ENCOUNTER — HOSPITAL ENCOUNTER (OUTPATIENT)
Dept: GENERAL RADIOLOGY | Facility: HOSPITAL | Age: 79
Discharge: HOME | End: 2019-04-19

## 2019-04-19 ENCOUNTER — ANESTHESIA EVENT (OUTPATIENT)
Dept: PAIN MEDICINE | Facility: HOSPITAL | Age: 79
End: 2019-04-19

## 2019-04-19 ENCOUNTER — ANESTHESIA (OUTPATIENT)
Dept: PAIN MEDICINE | Facility: HOSPITAL | Age: 79
End: 2019-04-19

## 2019-04-19 ENCOUNTER — HOSPITAL ENCOUNTER (OUTPATIENT)
Dept: PAIN MEDICINE | Facility: HOSPITAL | Age: 79
Discharge: HOME | End: 2019-04-19
Admitting: ANESTHESIOLOGY

## 2019-04-19 VITALS
BODY MASS INDEX: 23.03 KG/M2 | RESPIRATION RATE: 16 BRPM | WEIGHT: 170 LBS | TEMPERATURE: 98.4 F | HEIGHT: 72 IN | HEART RATE: 76 BPM | OXYGEN SATURATION: 97 % | DIASTOLIC BLOOD PRESSURE: 76 MMHG | SYSTOLIC BLOOD PRESSURE: 160 MMHG

## 2019-04-19 DIAGNOSIS — M51.36 DDD (DEGENERATIVE DISC DISEASE), LUMBAR: Primary | ICD-10-CM

## 2019-04-19 DIAGNOSIS — M51.16 HERNIATION OF LUMBAR INTERVERTEBRAL DISC WITH RADICULOPATHY: ICD-10-CM

## 2019-04-19 DIAGNOSIS — R52 PAIN: ICD-10-CM

## 2019-04-19 DIAGNOSIS — M48.062 SPINAL STENOSIS OF LUMBAR REGION WITH NEUROGENIC CLAUDICATION: ICD-10-CM

## 2019-04-19 LAB — GLUCOSE BLDC GLUCOMTR-MCNC: 114 MG/DL (ref 70–130)

## 2019-04-19 PROCEDURE — 0 IOPAMIDOL 41 % SOLUTION: Performed by: ANESTHESIOLOGY

## 2019-04-19 PROCEDURE — C1755 CATHETER, INTRASPINAL: HCPCS

## 2019-04-19 PROCEDURE — 25010000002 METHYLPREDNISOLONE PER 80 MG: Performed by: ANESTHESIOLOGY

## 2019-04-19 PROCEDURE — 82962 GLUCOSE BLOOD TEST: CPT

## 2019-04-19 PROCEDURE — 77003 FLUOROGUIDE FOR SPINE INJECT: CPT

## 2019-04-19 RX ORDER — SODIUM CHLORIDE 0.9 % (FLUSH) 0.9 %
3-10 SYRINGE (ML) INJECTION AS NEEDED
Status: DISCONTINUED | OUTPATIENT
Start: 2019-04-19 | End: 2019-04-20 | Stop reason: HOSPADM

## 2019-04-19 RX ORDER — METHYLPREDNISOLONE ACETATE 80 MG/ML
80 INJECTION, SUSPENSION INTRA-ARTICULAR; INTRALESIONAL; INTRAMUSCULAR; SOFT TISSUE ONCE
Status: COMPLETED | OUTPATIENT
Start: 2019-04-19 | End: 2019-04-19

## 2019-04-19 RX ORDER — FENTANYL CITRATE 50 UG/ML
50 INJECTION, SOLUTION INTRAMUSCULAR; INTRAVENOUS AS NEEDED
Status: DISCONTINUED | OUTPATIENT
Start: 2019-04-19 | End: 2019-04-20 | Stop reason: HOSPADM

## 2019-04-19 RX ORDER — SODIUM CHLORIDE 0.9 % (FLUSH) 0.9 %
3 SYRINGE (ML) INJECTION EVERY 12 HOURS SCHEDULED
Status: DISCONTINUED | OUTPATIENT
Start: 2019-04-19 | End: 2019-04-20 | Stop reason: HOSPADM

## 2019-04-19 RX ORDER — LIDOCAINE HYDROCHLORIDE 10 MG/ML
1 INJECTION, SOLUTION INFILTRATION; PERINEURAL ONCE
Status: DISCONTINUED | OUTPATIENT
Start: 2019-04-19 | End: 2019-04-20 | Stop reason: HOSPADM

## 2019-04-19 RX ADMIN — METHYLPREDNISOLONE ACETATE 80 MG: 80 INJECTION, SUSPENSION INTRA-ARTICULAR; INTRALESIONAL; INTRAMUSCULAR; SOFT TISSUE at 12:12

## 2019-04-19 RX ADMIN — IOPAMIDOL 10 ML: 408 INJECTION, SOLUTION INTRATHECAL at 12:12

## 2019-04-19 NOTE — ANESTHESIA PROCEDURE NOTES
PAIN Epidural block    Pre-sedation assessment completed: 4/19/2019 11:58 AM    Patient reassessed immediately prior to procedure    Patient location during procedure: pain clinic  Start Time: 4/19/2019 12:04 PM  Stop Time: 4/19/2019 12:11 PM  Indication:procedure for pain  Performed By  Anesthesiologist: Lg Elliott MD  Preanesthetic Checklist  Completed: patient identified, site marked, surgical consent, pre-op evaluation, timeout performed, IV checked, risks and benefits discussed and monitors and equipment checked  Additional Notes  Post-Op Diagnosis Codes:     * Spinal stenosis of lumbar region with neurogenic claudication (M48.062)     * DDD (degenerative disc disease), lumbar (M51.36)     * 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 All questions were answered. The patient was discharged with appropriate instructions.  Prep:  Pt Position:prone  Sterile Tech:cap, gloves, mask and sterile barrier  Prep:chlorhexidine gluconate and isopropyl alcohol  Monitoring:blood pressure monitoring, continuous pulse oximetry and EKG  Procedure:Sedation: no     Approach:left paramedian  Guidance: fluoroscopy  Location:lumbar  Level:3-4  Needle Type:Tuohy  Needle Gauge:20 G  Aspiration:negative  Medications:  Depomedrol:80 mg  Preservative Free Saline:3mL  Isovue:1mL  Comments:Appropriate epidural spread of contrast.   Post Assessment:  Post-procedure: Dressing per nursing.  Pt Tolerance:patient tolerated the procedure well with no apparent complications  Complications:no

## 2019-04-19 NOTE — H&P
CHIEF COMPLAINT:  Low back pain        HISTORY OF PRESENT ILLNESS:  This patient is complaining of low back pain which radiates down his left leg often in the lateral distribution.  Of note, this patient has dementia and is a poor historian.  His wife is here with him helping with the history.    He has had a recent appointment with Dr. Ranjeet Contreras who comments on the MRI recurrence of nerve compression on the left at L3-L4 with a herniated disc.      Prior injections afforded 50 % relief of pain and significantly increased activities of daily living.  Of course, is difficult to get an exact number from him secondary to the dementia.     PAST MEDICAL HISTORY:  Current Outpatient Medications on File Prior to Encounter   Medication Sig Dispense Refill   • atorvastatin (LIPITOR) 80 MG tablet TAKE 1 TABLET EVERY DAY 90 tablet 1   • BESIVANCE 0.6 % suspension ophthalmic suspension   1   • gabapentin (NEURONTIN) 300 MG capsule Take 1 capsule by mouth 3 (Three) Times a Day. 21 capsule 0   • ILEVRO 0.3 % suspension   1   • INVELTYS 1 % suspension   1   • levothyroxine (SYNTHROID, LEVOTHROID) 75 MCG tablet TAKE 1 TABLET EVERY DAY 90 tablet 1   • memantine (NAMENDA) 10 MG tablet Take 5 mg by mouth 2 (Two) Times a Day.     • SITagliptin (JANUVIA) 100 MG tablet Take 100 mg by mouth Daily.     • traMADol (ULTRAM) 50 MG tablet Take 1 tablet by mouth Daily As Needed for Moderate Pain . 30 tablet 2   • aspirin  MG tablet Take 1 tablet by mouth Daily.     • Ferrous Sulfate 27 MG tablet Take 27 mg by mouth Every Night.     • Lancets (ONETOUCH ULTRASOFT) lancets Daily fasting glucose 100 each 12   • ONE TOUCH ULTRA TEST test strip 1 each by Other route Daily. 100 each 5     No current facility-administered medications on file prior to encounter.        Past Medical History:   Diagnosis Date   • Acute myocardial infarction (CMS/HCC)    • Anesthesia complication     DIFFICULTY COMING OUT OF SEDATION   • Aortic valve sclerosis  "   • Coronary artery disease     History of remote anterior wall myocardial infarction in 1989.   • Diabetes mellitus (CMS/HCC)    • Disease of thyroid gland    • Erectile dysfunction    • Fall     fell and was seen in the ED, \"his leg gave out on him\"   • Hearing difficulty of both ears     Normally wears hearing aids   • Heart attack (CMS/HCC)    • Heart attack (CMS/HCC)    • Heart murmur    • Hyperlipidemia    • Intermittent claudication (CMS/HCC)    • Low back pain    • Osteoarthritis    • Peripheral neuropathy    • Peripheral vascular disease (CMS/HCC)    • Skin cancer    • Sleep apnea     cpap, can't use due to drooling   • Spinal stenosis        SOCIAL HISTORY:  Counseled to avoid tobacco     REVIEW OF SYSTEMS:  No pertinent hematologic, infectious, or constitutional symptoms.  Other review of systems non-contributory     PHYSICAL EXAM:  There were no vitals taken for this visit.  Constitutional: Well-developed well-nourished no acute distress  General: Alert and oriented  Ophtho: EOMI  Airway: Mallampati 2  Cardiac:  Regular rate  Lungs:  Clear to auscultation bilaterally   GI: soft, nontender  Cervical Spine: Noncontributory  Sacroiliac Joints: Noncontributory  Musc: Decreased range of motion and pain with forward flexion  Neuro: Straight leg raise test causes return of symptoms in his left leg   DIAGNOSIS:  Post-Op Diagnosis Codes:  Post-Op Diagnosis Codes:     * Spinal stenosis of lumbar region with neurogenic claudication [M48.062]     * DDD (degenerative disc disease), lumbar [M51.36]     * Lumbar disc herniation with radiculopathy [M51.16]     PLAN:  -  This will be his second lumbar epidural steroid injection.  It seems as if prior injection was performed at the L5-S1 level.    Today I will target the L3-L4 level and attempt to get left greater than right contrast spread.  - Risks were discussed with the patient, including but not limited to: failure of relief, worsening pain, tissue, nerve or spinal " cord damage, headache (post dural puncture headache), bleeding (epidural hematoma) and infection (epidural abscess or skin infection). Benefits and alternatives were also discussed. No promises were made. The patient voiced understanding.  -  Physical therapy exercises at home should be considered, as tolerated, as prescribed by physical therapy or from the pain clinic handout (given to the patient).  Continuation of these exercises every day, or multiple times per week, even when the patient has good pain relief, was stressed to the patient as a preventative measure to decrease the frequency and severity of future pain episodes.  -  It is recommended that the patient use the least amount of opioid medication possible.     -  Heat and ice to the affected area as tolerated for pain control.  It was discussed that heating pads can cause burns.  -  Daily low impact exercise such as walking or water exercise was recommended to maintain overall health and aid in weight control.   -  Patient was counseled to abstain from tobacco products.  -  Follow up as needed for subsequent injections.      Jia Grande M.D., [facility], [facility]  Board Certified Pain Medicine Specialist by the American Board of Anesthesiology  Board Certified Anesthesiologist by the American Board of Anesthesiology

## 2019-04-22 NOTE — TELEPHONE ENCOUNTER
Patient's wife, Sandra, called stating that patient is still having severe confusion and hallucinations. Patient's wife stated that patient has seen Dr. Contreras and was started on Gabapentin and Zyprexa. Patient's wife wanted to know if these medications could be causing the extreme hallucinations. Patient's wife also stated that the patient was recently diagnosed with early onset dementia. Per Dr. Dahl these medications COULD be causing the confusion, however, there is also a possibility that this could be from the dementia itself. Patient's wife notified and advised to contact Dr. Contreras's office for more questions concerning the confusion and hallucinations. Patient's wife stated that she has been reading online that her 's symptoms also coincide with symptoms of parkinson's. Patient's wife would like to know what Dr. Dahl thinks. Dr. Dahl stated that he thinks it could be Lewy Body Dementia rather than parkinson's. LVM- patient notified.   Is This A New Presentation, Or A Follow-Up?: Rash Additional History: Pt using OTC hydrocortisone. Rash not resolved.

## 2019-05-06 ENCOUNTER — TELEPHONE (OUTPATIENT)
Dept: INTERNAL MEDICINE | Facility: CLINIC | Age: 79
End: 2019-05-06

## 2019-05-06 NOTE — TELEPHONE ENCOUNTER
Pt wife called in stating pt needs rf on samples of januvia 100mg.  She said it is hard to keep his BS below 150.  Did you want him to stay on this medicine or try something else?    [phone]

## 2019-05-07 NOTE — TELEPHONE ENCOUNTER
I called and spoke with pt wife and explained situation.  They will be in wed at 2 on ma schedule  Samples of januvia up front as well

## 2019-05-07 NOTE — TELEPHONE ENCOUNTER
Continue the januvia 100mg. Lets add Trulicity 0.75mg weekly. Patient may need to come in on MA schedule to be shown how to do this, along with his wife. He does have dementia.

## 2019-05-08 DIAGNOSIS — E11.9 DIABETES MELLITUS TYPE 2, NONINSULIN DEPENDENT (HCC): Primary | ICD-10-CM

## 2019-05-09 DIAGNOSIS — E11.9 DIABETES MELLITUS TYPE 2, NONINSULIN DEPENDENT (HCC): ICD-10-CM

## 2019-05-16 ENCOUNTER — TELEPHONE (OUTPATIENT)
Dept: INTERNAL MEDICINE | Facility: CLINIC | Age: 79
End: 2019-05-16

## 2019-05-16 NOTE — TELEPHONE ENCOUNTER
Spoke to patient wife, she states he only has 1 more week of trulicity. It will be $250 for 3 months supply. I advised we will call her if we get more samples.

## 2019-05-16 NOTE — TELEPHONE ENCOUNTER
Patient's wife, Sandra, called stating that patient was started on Trulicity. Patient's wife stated that patient's blood sugar has been averaging about 127 for the past 4-5 days. Patient's wife wanted to see if Dr. Dahl wanted to keep the patient on this medication. Please advise.

## 2019-05-18 NOTE — TELEPHONE ENCOUNTER
The $250 for 3 mos of this medication for medicare is considered a good price. He should get it if if he can. We will provide him with samples when we get it.

## 2019-05-20 ENCOUNTER — TELEPHONE (OUTPATIENT)
Dept: NEUROSURGERY | Facility: CLINIC | Age: 79
End: 2019-05-20

## 2019-05-20 DIAGNOSIS — M48.062 SPINAL STENOSIS OF LUMBAR REGION WITH NEUROGENIC CLAUDICATION: Primary | ICD-10-CM

## 2019-05-20 NOTE — TELEPHONE ENCOUNTER
Pt wife called said pt had CRISSY and it has helped with his leg pain however she said he is having leg weakness and trouble with his gait and walking. She would like to know if he can try some PT to help with leg strengthening and balance?

## 2019-05-21 NOTE — TELEPHONE ENCOUNTER
Spoke with patient's wife and told her that Dr DUMONT has approved PT, she request that order be sent to [facility]- I told her that we would send it and they would contact her to schedule

## 2019-06-03 ENCOUNTER — HOSPITAL ENCOUNTER (OUTPATIENT)
Dept: PHYSICAL THERAPY | Facility: HOSPITAL | Age: 79
Setting detail: THERAPIES SERIES
Discharge: HOME | End: 2019-06-03

## 2019-06-03 DIAGNOSIS — R26.2 DIFFICULTY WALKING: ICD-10-CM

## 2019-06-03 DIAGNOSIS — M54.42 CHRONIC LEFT-SIDED LOW BACK PAIN WITH LEFT-SIDED SCIATICA: Primary | ICD-10-CM

## 2019-06-03 DIAGNOSIS — G89.29 CHRONIC LEFT-SIDED LOW BACK PAIN WITH LEFT-SIDED SCIATICA: Primary | ICD-10-CM

## 2019-06-03 PROCEDURE — 97110 THERAPEUTIC EXERCISES: CPT

## 2019-06-03 PROCEDURE — 97162 PT EVAL MOD COMPLEX 30 MIN: CPT

## 2019-06-10 ENCOUNTER — HOSPITAL ENCOUNTER (OUTPATIENT)
Dept: PHYSICAL THERAPY | Facility: HOSPITAL | Age: 79
Setting detail: THERAPIES SERIES
Discharge: HOME | End: 2019-06-10

## 2019-06-10 DIAGNOSIS — R26.2 DIFFICULTY WALKING: ICD-10-CM

## 2019-06-10 DIAGNOSIS — M54.42 CHRONIC LEFT-SIDED LOW BACK PAIN WITH LEFT-SIDED SCIATICA: Primary | ICD-10-CM

## 2019-06-10 DIAGNOSIS — G89.29 CHRONIC LEFT-SIDED LOW BACK PAIN WITH LEFT-SIDED SCIATICA: Primary | ICD-10-CM

## 2019-06-10 PROCEDURE — 97110 THERAPEUTIC EXERCISES: CPT

## 2019-06-10 NOTE — THERAPY TREATMENT NOTE
"    Outpatient Physical Therapy Ortho Treatment Note  [facility]     Patient Name: Anthony Hernandez  : 1940  MRN: [id number]  Today's Date: 6/10/2019      Visit Date: 06/10/2019    Visit Dx:    ICD-10-CM ICD-9-CM   1. Chronic left-sided low back pain with left-sided sciatica M54.42 724.2    G89.29 724.3     338.29   2. Difficulty walking R26.2 719.7       Patient Active Problem List   Diagnosis   • Aortic heart valve narrowing   • Asymptomatic carotid artery narrowing without infarction   • Cor pulmonale (CMS/HCC)   • Functional murmur   • BP (high blood pressure)   • Lumbar radiculopathy   • CN (constipation)   • DDD (degenerative disc disease), lumbar   • Fatigue   • HLD (hyperlipidemia)   • Amnesia   • ZAIN (obstructive sleep apnea)   • Loud breathing during sleep   • Diabetes mellitus type 2, noninsulin dependent (CMS/HCC)   • Multiple thyroid nodules   • Spinal stenosis of lumbar region with neurogenic claudication   • Difficulty walking   • Hearing loss of both ears   • Episodic confusion   • Altered mental status   • Late onset Alzheimer's disease with behavioral disturbance   • Dementia without behavioral disturbance   • Hypothyroidism   • Acute intractable headache   • Herniation of lumbar intervertebral disc with radiculopathy   • Halitosis        Past Medical History:   Diagnosis Date   • Acute myocardial infarction (CMS/HCC)    • Anesthesia complication     DIFFICULTY COMING OUT OF SEDATION   • Aortic valve sclerosis    • Coronary artery disease     History of remote anterior wall myocardial infarction in .   • Diabetes mellitus (CMS/HCC)    • Disease of thyroid gland    • Erectile dysfunction    • Fall     fell and was seen in the ED, \"his leg gave out on him\"   • Hearing difficulty of both ears     Normally wears hearing aids   • Heart attack (CMS/HCC)    • Heart attack (CMS/HCC)    • Heart murmur    • Hyperlipidemia    • Intermittent claudication (CMS/HCC)    • Low back pain    • " Osteoarthritis    • Peripheral neuropathy    • Peripheral vascular disease (CMS/HCC)    • Skin cancer    • Sleep apnea     cpap, can't use due to drooling   • Spinal stenosis         Past Surgical History:   Procedure Laterality Date   • ATHERECTOMY Left     BRANT FEMORAL-POPLITEAL INITIAL STENOSIS WITH ATHERECTOMY AND STENT LEFT   • CATARACT EXTRACTION, BILATERAL     • COLONOSCOPY     • COLONOSCOPY W/ POLYPECTOMY      BENIGN   • CORONARY ANGIOPLASTY WITH STENT PLACEMENT     • CORONARY ARTERY BYPASS GRAFT  04/2010    cabg x 3: LIMA to LAD, vein graft to RCA and OM1   • EPIDURAL  01/2019   • LUMBAR LAMINECTOMY DISCECTOMY DECOMPRESSION Bilateral 6/27/2017    Procedure: L3/4, L4/5 OPEN LUMBAR DECOMPRESSION POSTERIOR 1-2 LEVELS;  Surgeon: Ranjeet Contreras MD;  Location: [facility];  Service:    • SPINE SURGERY     • THROMBOENDARTERECTOMY Left     NEUROLOGICAL SURGERY CAROTID ENDARTERECTOMY LEFT                       PT Assessment/Plan     Row Name 06/10/19 1400          PT Assessment    Assessment Comments  Mr. Hernandez returns today for first f/u since initial eval. He reports limited compliance with HEP, as he lost his written handout. Continued progression of gentle core and postural strengthening. Increased time to complete all exercises due to extensive cues required for technique on all exercises, and limited by hard of hearing and dementia.   -CA        PT Plan    PT Plan Comments  Next visit, consider addition of HS stretch and balance activities.  -CA       User Key  (r) = Recorded By, (t) = Taken By, (c) = Cosigned By    Initials Name Provider Type    Nancy Oden, PT Physical Therapist            Exercises     Row Name 06/10/19 1300             Subjective Comments    Subjective Comments  Pt reports he lost his HEP and has only done the exercises a couple of times since eval.   -CA         Subjective Pain    Able to rate subjective pain?  yes  -CA      Pre-Treatment Pain Level  0  -CA         Total  Minutes    95128 - PT Therapeutic Exercise Minutes  45  -CA         Exercise 1    Exercise Name 1  Post. Pelvic tilt  -CA      Cueing 1  Verbal;Tactile  -CA      Reps 1  10  -CA      Time 1  5s  -CA         Exercise 2    Exercise Name 2  Modified Piriformis Stretch  -CA      Cueing 2  Verbal  -CA      Reps 2  3  -CA      Time 2  20s  -CA      Additional Comments  B  -CA         Exercise 3    Exercise Name 3  nu step  -CA      Cueing 3  Verbal  -CA      Time 3  5 min  -CA         Exercise 4    Exercise Name 4  HL Hip abd w/ RTB  -CA      Cueing 4  Verbal  -CA      Sets 4  2  -CA      Reps 4  10  -CA         Exercise 5    Exercise Name 5  HL hip add w/ pink ball  -CA      Cueing 5  Verbal;Tactile  -CA      Sets 5  2  -CA      Reps 5  10  -CA      Time 5  3s  -CA         Exercise 6    Exercise Name 6  LTR  -CA      Cueing 6  Verbal  -CA      Reps 6  10  -CA         Exercise 7    Exercise Name 7  LAQ  -CA      Cueing 7  Verbal  -CA      Reps 7  20 B  -CA      Additional Comments  3#  -CA         Exercise 8    Exercise Name 8  Row w/ RTB  -CA      Cueing 8  Verbal;Tactile;Demo  -CA      Reps 8  12  -CA      Additional Comments  cues for upright posture, tactile cues for scap retrction  -CA        User Key  (r) = Recorded By, (t) = Taken By, (c) = Cosigned By    Initials Name Provider Type    Nancy Oden, PT Physical Therapist                           Therapy Education  Education Details: Re-distributed written HEP, as pt reports he lost his first copy, with addition of hip abduction exercise.  Given: HEP  Program: Reinforced  How Provided: Verbal, Written, Demonstration  Provided to: Patient, Caregiver  Level of Understanding: Verbalized, Demonstrated              Time Calculation:   Start Time: 1345  Stop Time: 1430  Time Calculation (min): 45 min  Total Timed Code Minutes- PT: 45 minute(s)  Therapy Charges for Today     Code Description Service Date Service Provider Modifiers Qty    36057862571  PT THER PROC  EA 15 MIN 6/10/2019 Nancy Rodrigues, PT GP 3                    Nancy Rodrigues, PT  6/10/2019

## 2019-06-14 ENCOUNTER — HOSPITAL ENCOUNTER (OUTPATIENT)
Dept: PHYSICAL THERAPY | Facility: HOSPITAL | Age: 79
Setting detail: THERAPIES SERIES
Discharge: HOME | End: 2019-06-14

## 2019-06-14 DIAGNOSIS — R26.2 DIFFICULTY WALKING: ICD-10-CM

## 2019-06-14 DIAGNOSIS — M54.42 CHRONIC LEFT-SIDED LOW BACK PAIN WITH LEFT-SIDED SCIATICA: Primary | ICD-10-CM

## 2019-06-14 DIAGNOSIS — G89.29 CHRONIC LEFT-SIDED LOW BACK PAIN WITH LEFT-SIDED SCIATICA: Primary | ICD-10-CM

## 2019-06-14 PROCEDURE — 97112 NEUROMUSCULAR REEDUCATION: CPT

## 2019-06-14 PROCEDURE — 97110 THERAPEUTIC EXERCISES: CPT

## 2019-06-14 NOTE — THERAPY TREATMENT NOTE
"    Outpatient Physical Therapy Ortho Treatment Note  [facility]     Patient Name: Anthony Hernandez  : 1940  MRN: [id number]  Today's Date: 2019      Visit Date: 2019    Visit Dx:    ICD-10-CM ICD-9-CM   1. Chronic left-sided low back pain with left-sided sciatica M54.42 724.2    G89.29 724.3     338.29   2. Difficulty walking R26.2 719.7       Patient Active Problem List   Diagnosis   • Aortic heart valve narrowing   • Asymptomatic carotid artery narrowing without infarction   • Cor pulmonale (CMS/HCC)   • Functional murmur   • BP (high blood pressure)   • Lumbar radiculopathy   • CN (constipation)   • DDD (degenerative disc disease), lumbar   • Fatigue   • HLD (hyperlipidemia)   • Amnesia   • ZAIN (obstructive sleep apnea)   • Loud breathing during sleep   • Diabetes mellitus type 2, noninsulin dependent (CMS/HCC)   • Multiple thyroid nodules   • Spinal stenosis of lumbar region with neurogenic claudication   • Difficulty walking   • Hearing loss of both ears   • Episodic confusion   • Altered mental status   • Late onset Alzheimer's disease with behavioral disturbance   • Dementia without behavioral disturbance   • Hypothyroidism   • Acute intractable headache   • Herniation of lumbar intervertebral disc with radiculopathy   • Halitosis        Past Medical History:   Diagnosis Date   • Acute myocardial infarction (CMS/HCC)    • Anesthesia complication     DIFFICULTY COMING OUT OF SEDATION   • Aortic valve sclerosis    • Coronary artery disease     History of remote anterior wall myocardial infarction in .   • Diabetes mellitus (CMS/HCC)    • Disease of thyroid gland    • Erectile dysfunction    • Fall     fell and was seen in the ED, \"his leg gave out on him\"   • Hearing difficulty of both ears     Normally wears hearing aids   • Heart attack (CMS/HCC)    • Heart attack (CMS/HCC)    • Heart murmur    • Hyperlipidemia    • Intermittent claudication (CMS/HCC)    • Low back pain    • " Osteoarthritis    • Peripheral neuropathy    • Peripheral vascular disease (CMS/HCC)    • Skin cancer    • Sleep apnea     cpap, can't use due to drooling   • Spinal stenosis         Past Surgical History:   Procedure Laterality Date   • ATHERECTOMY Left     BRANT FEMORAL-POPLITEAL INITIAL STENOSIS WITH ATHERECTOMY AND STENT LEFT   • CATARACT EXTRACTION, BILATERAL     • COLONOSCOPY     • COLONOSCOPY W/ POLYPECTOMY      BENIGN   • CORONARY ANGIOPLASTY WITH STENT PLACEMENT     • CORONARY ARTERY BYPASS GRAFT  04/2010    cabg x 3: LIMA to LAD, vein graft to RCA and OM1   • EPIDURAL  01/2019   • LUMBAR LAMINECTOMY DISCECTOMY DECOMPRESSION Bilateral 6/27/2017    Procedure: L3/4, L4/5 OPEN LUMBAR DECOMPRESSION POSTERIOR 1-2 LEVELS;  Surgeon: Ranjeet Contreras MD;  Location: [facility];  Service:    • SPINE SURGERY     • THROMBOENDARTERECTOMY Left     NEUROLOGICAL SURGERY CAROTID ENDARTERECTOMY LEFT                       PT Assessment/Plan     Row Name 06/14/19 1300          PT Assessment    Assessment Comments  Per pts wife, pt has had difficulty with HEP compliance secondary to dementia. Pts wife requesting to move PT to 3x/week to see if being here more would help him stay consistent with exercises. Discussed that a large portion of PT is for pts to learn how to manage back pain independently, and compliance with the HEP is important to success. Recommended that wife help pt complete exercises to ensure compliance. Review HEP this date with patient and progressed balance activities to improve safety. Pt requries maximal cues during standing ther ex for postural awareness, along with tactile cues. Pt also requires multiple seated rest breaks throughout standing exercises.  -CA        PT Plan    PT Plan Comments  Discuss HEP compliance, cont to progress as able.  -CA       User Key  (r) = Recorded By, (t) = Taken By, (c) = Cosigned By    Initials Name Provider Type    Nancy Oden, PT Physical Therapist             Exercises     Row Name 06/14/19 1300             Subjective Comments    Subjective Comments  Per pts wife, he has not been compliant with HEP. She reports he will do only a couple of exercises and then say he is done.   -CA         Subjective Pain    Able to rate subjective pain?  yes  -CA      Pre-Treatment Pain Level  9  -CA      Subjective Pain Comment  non-verbal signs do not match verbal reports; likely secondary to cognitive status.  -CA         Total Minutes    15830 - PT Therapeutic Exercise Minutes  30  -CA      18981 -  PT Neuromuscular Reeducation Minutes  15  -CA         Exercise 3    Exercise Name 3  nu step  -CA      Cueing 3  Verbal  -CA      Time 3  5 min  -CA      Additional Comments  lvl 5; UE/LE  -CA         Exercise 6    Exercise Name 6  LTR  -CA      Cueing 6  Verbal  -CA      Reps 6  10  -CA         Exercise 7    Exercise Name 7  LAQ  -CA      Cueing 7  Verbal  -CA      Reps 7  20 B  -CA      Additional Comments  3#  -CA         Exercise 8    Exercise Name 8  Row w/ RTB  -CA      Cueing 8  Verbal;Tactile;Demo  -CA      Sets 8  2  -CA      Reps 8  10  -CA      Additional Comments  cues for upright posture, tactile cues for scap retrction  -CA         Exercise 9    Exercise Name 9  tandem walk at [employer] bar  -CA      Cueing 9  Verbal  -CA      Reps 9  2 laps  -CA      Additional Comments  cues for upright posture; CGA with gait belt; one UE support; rest break between laps  -CA         Exercise 10    Exercise Name 10  side steps at ballet bar  -CA      Cueing 10  Verbal  -CA      Reps 10  2 laps  -CA      Additional Comments  max cues for upright posture and form; CGA with gait belt; B UE support; rest break between laps  -CA         Exercise 11    Exercise Name 11  seated HS curl w/ RTB  -CA      Cueing 11  Verbal  -CA      Reps 11  12 B  -CA        User Key  (r) = Recorded By, (t) = Taken By, (c) = Cosigned By    Initials Name Provider Type    Nancy Oden, PT Physical Therapist                                           Time Calculation:   Start Time: 1315  Stop Time: 1355  Time Calculation (min): 40 min  Total Timed Code Minutes- PT: 40 minute(s)  Therapy Charges for Today     Code Description Service Date Service Provider Modifiers Qty    63820550430  PT NEUROMUSC RE EDUCATION EA 15 MIN 6/14/2019 Nancy Rodrigues, PT GP 1    71189395243 HC PT THER PROC EA 15 MIN 6/14/2019 Nancy Rodrigues, PT GP 2                    Nancy Rodrigues, PT  6/14/2019

## 2019-06-17 ENCOUNTER — TELEPHONE (OUTPATIENT)
Dept: INTERNAL MEDICINE | Facility: CLINIC | Age: 79
End: 2019-06-17

## 2019-06-17 ENCOUNTER — HOSPITAL ENCOUNTER (OUTPATIENT)
Dept: PHYSICAL THERAPY | Facility: HOSPITAL | Age: 79
Setting detail: THERAPIES SERIES
Discharge: HOME | End: 2019-06-17

## 2019-06-17 DIAGNOSIS — G89.29 CHRONIC LEFT-SIDED LOW BACK PAIN WITH LEFT-SIDED SCIATICA: Primary | ICD-10-CM

## 2019-06-17 DIAGNOSIS — M54.42 CHRONIC LEFT-SIDED LOW BACK PAIN WITH LEFT-SIDED SCIATICA: Primary | ICD-10-CM

## 2019-06-17 DIAGNOSIS — R26.2 DIFFICULTY WALKING: ICD-10-CM

## 2019-06-17 PROCEDURE — 97112 NEUROMUSCULAR REEDUCATION: CPT

## 2019-06-17 PROCEDURE — 97110 THERAPEUTIC EXERCISES: CPT

## 2019-06-17 NOTE — TELEPHONE ENCOUNTER
Patient's wife, Sandra, called stating that patient has been having dizzy spells since starting Trulicity injections. Patient's wife stated that patient refused his medication on Friday. Please advise.

## 2019-06-17 NOTE — TELEPHONE ENCOUNTER
Patient's wife notified and voiced understanding. Patient has an appointment scheduled for 7/10/19.

## 2019-06-17 NOTE — THERAPY TREATMENT NOTE
"    Outpatient Physical Therapy Ortho Treatment Note  [facility]     Patient Name: Anthony Hernandez  : 1940  MRN: [id number]  Today's Date: 2019      Visit Date: 2019    Visit Dx:    ICD-10-CM ICD-9-CM   1. Chronic left-sided low back pain with left-sided sciatica M54.42 724.2    G89.29 724.3     338.29   2. Difficulty walking R26.2 719.7       Patient Active Problem List   Diagnosis   • Aortic heart valve narrowing   • Asymptomatic carotid artery narrowing without infarction   • Cor pulmonale (CMS/HCC)   • Functional murmur   • BP (high blood pressure)   • Lumbar radiculopathy   • CN (constipation)   • DDD (degenerative disc disease), lumbar   • Fatigue   • HLD (hyperlipidemia)   • Amnesia   • ZAIN (obstructive sleep apnea)   • Loud breathing during sleep   • Diabetes mellitus type 2, noninsulin dependent (CMS/HCC)   • Multiple thyroid nodules   • Spinal stenosis of lumbar region with neurogenic claudication   • Difficulty walking   • Hearing loss of both ears   • Episodic confusion   • Altered mental status   • Late onset Alzheimer's disease with behavioral disturbance   • Dementia without behavioral disturbance   • Hypothyroidism   • Acute intractable headache   • Herniation of lumbar intervertebral disc with radiculopathy   • Halitosis        Past Medical History:   Diagnosis Date   • Acute myocardial infarction (CMS/HCC)    • Anesthesia complication     DIFFICULTY COMING OUT OF SEDATION   • Aortic valve sclerosis    • Coronary artery disease     History of remote anterior wall myocardial infarction in .   • Diabetes mellitus (CMS/HCC)    • Disease of thyroid gland    • Erectile dysfunction    • Fall     fell and was seen in the ED, \"his leg gave out on him\"   • Hearing difficulty of both ears     Normally wears hearing aids   • Heart attack (CMS/HCC)    • Heart attack (CMS/HCC)    • Heart murmur    • Hyperlipidemia    • Intermittent claudication (CMS/HCC)    • Low back pain    • " Osteoarthritis    • Peripheral neuropathy    • Peripheral vascular disease (CMS/HCC)    • Skin cancer    • Sleep apnea     cpap, can't use due to drooling   • Spinal stenosis         Past Surgical History:   Procedure Laterality Date   • ATHERECTOMY Left     BRANT FEMORAL-POPLITEAL INITIAL STENOSIS WITH ATHERECTOMY AND STENT LEFT   • CATARACT EXTRACTION, BILATERAL     • COLONOSCOPY     • COLONOSCOPY W/ POLYPECTOMY      BENIGN   • CORONARY ANGIOPLASTY WITH STENT PLACEMENT     • CORONARY ARTERY BYPASS GRAFT  04/2010    cabg x 3: LIMA to LAD, vein graft to RCA and OM1   • EPIDURAL  01/2019   • LUMBAR LAMINECTOMY DISCECTOMY DECOMPRESSION Bilateral 6/27/2017    Procedure: L3/4, L4/5 OPEN LUMBAR DECOMPRESSION POSTERIOR 1-2 LEVELS;  Surgeon: Ranjeet Contreras MD;  Location: [facility];  Service:    • SPINE SURGERY     • THROMBOENDARTERECTOMY Left     NEUROLOGICAL SURGERY CAROTID ENDARTERECTOMY LEFT                       PT Assessment/Plan     Row Name 06/17/19 1347          PT Assessment    Assessment Comments  Pt continues to require cuing for all activities verbally and tactile cues. 2 seated rest breaks today. Pt eager to participate and willing to perform any task asked, just assist required to count and to ensure appropriate form.   -LB        PT Plan    PT Plan Comments  Continue to progress standing exercises for posture and strengthening as able.   -LB       User Key  (r) = Recorded By, (t) = Taken By, (c) = Cosigned By    Initials Name Provider Type    Awilda Reagan, PT Physical Therapist            Exercises     Row Name 06/17/19 1300             Subjective Comments    Subjective Comments  I am doing ok. I did some exercises after 6 oclock yesterday.  -LB         Subjective Pain    Able to rate subjective pain?  yes  -LB      Pre-Treatment Pain Level  0  -LB      Subjective Pain Comment  I don't have any pain today.  -LB         Total Minutes    05053 - PT Therapeutic Exercise Minutes  30  -LB      40988 -   PT Neuromuscular Reeducation Minutes  15  -LB         Exercise 1    Exercise Name 1  seated HS stretch  -LB      Reps 1  3  -LB      Time 1  20  -LB         Exercise 2    Exercise Name 2  standing HS curl  -LB      Reps 2  8  -LB      Additional Comments  attempted; difficulty with comprehending movement  -LB         Exercise 3    Exercise Name 3  nu step  -LB      Cueing 3  Verbal  -LB      Time 3  5 min  -LB      Additional Comments  lvl 5; UE/LE  -LB         Exercise 4    Exercise Name 4  HL Hip abd w/ RTB  -LB      Sets 4  2  -LB      Reps 4  10  -LB         Exercise 5    Exercise Name 5  HL hip add w/ pink ball  -LB      Sets 5  2  -LB      Reps 5  10  -LB      Time 5  5s  -LB         Exercise 6    Exercise Name 6  LTR  -LB      Cueing 6  Verbal  -LB      Reps 6  10  -LB      Additional Comments  each side  -LB         Exercise 7    Exercise Name 7  LAQ  -LB      Cueing 7  Verbal  -LB      Reps 7  20 B  -LB      Additional Comments  3#  -LB         Exercise 8    Exercise Name 8  Row w/ RTB  -LB      Cueing 8  Verbal;Tactile;Demo  -LB      Sets 8  2  -LB      Reps 8  10  -LB      Additional Comments  cues for upright posture, tactile cues for scap retrction  -LB         Exercise 9    Exercise Name 9  tandem walk at ballet bar  -LB      Cueing 9  Verbal  -LB      Reps 9  2 laps  -LB      Additional Comments  cues for upright posture; CGA with gait belt; one UE support; rest break between laps  -LB         Exercise 10    Exercise Name 10  side steps at ballet bar  -LB      Cueing 10  Verbal  -LB      Reps 10  2 laps  -LB      Additional Comments  max cues for upright posture and form; CGA with gait belt; B UE support; rest break between laps  -LB         Exercise 11    Exercise Name 11  seated HS curl w/ RTB  -LB      Cueing 11  Verbal  -LB      Sets 11  2  -LB      Reps 11  10  -LB      Additional Comments  B RTB  -LB         Exercise 12    Exercise Name 12  wall wash   -LB      Reps 12  8  -LB        User Key   (r) = Recorded By, (t) = Taken By, (c) = Cosigned By    Initials Name Provider Type    LB Awilda Agosto, PT Physical Therapist                       PT OP Goals     Row Name 06/17/19 1300          PT Short Term Goals    STG Date to Achieve  07/01/19  -LB     STG 1  Patient will be independent with education for symptom management and initial HEP to decrease LBP pain.  -LB     STG 1 Progress  Ongoing  -LB     STG 1 Progress Comments  Requires continued cuing.  -LB     STG 2  Pt will improve B LE strength to at least 4+/5.  -LB     STG 2 Progress  Ongoing  -LB     STG 3  Pt will improve lumbar ROM to WNL with </=3/10 pain in all cardinal planes for improved mobility and participation in ADLs.  -LB     STG 3 Progress  Ongoing  -LB        Long Term Goals    LTG Date to Achieve  07/29/19  -LB     LTG 1  Patient will be independent with education for symptom management and advanced HEP to decrease LBP pain.  -LB     LTG 1 Progress  New  -LB     LTG 2  Patient will reduce level of percieved disability as measured by the Modified Oswestry  from 50% disability to </= 20% disability in order to improve quality of life.  -LB     LTG 2 Progress  New  -LB     LTG 3  Patient will improve walking tolerance to 30 min without LBP to improve participation in community activities.  -LB     LTG 3 Progress  New  -LB       User Key  (r) = Recorded By, (t) = Taken By, (c) = Cosigned By    Initials Name Provider Type    Awilda Reagan PT Physical Therapist          Therapy Education  Given: Posture/body mechanics  Program: Reinforced  How Provided: Verbal, Demonstration  Provided to: Patient  Level of Understanding: Verbalized              Time Calculation:   Start Time: 1300  Stop Time: 1345  Time Calculation (min): 45 min  Total Timed Code Minutes- PT: 40 minute(s)  Therapy Charges for Today     Code Description Service Date Service Provider Modifiers Qty    71409841509  PT NEUROMUSC RE EDUCATION EA 15 MIN 6/17/2019 Awilda Agosto  PT GP 1    [id number]  PT THER PROC EA 15 MIN 6/17/2019 Awilda Agosto, PT GP 2                    Awilda Agosto, PT  6/17/2019

## 2019-06-19 ENCOUNTER — HOSPITAL ENCOUNTER (OUTPATIENT)
Dept: PHYSICAL THERAPY | Facility: HOSPITAL | Age: 79
Setting detail: THERAPIES SERIES
Discharge: HOME | End: 2019-06-19

## 2019-06-19 DIAGNOSIS — M54.42 CHRONIC LEFT-SIDED LOW BACK PAIN WITH LEFT-SIDED SCIATICA: Primary | ICD-10-CM

## 2019-06-19 DIAGNOSIS — R26.2 DIFFICULTY WALKING: ICD-10-CM

## 2019-06-19 DIAGNOSIS — G89.29 CHRONIC LEFT-SIDED LOW BACK PAIN WITH LEFT-SIDED SCIATICA: Primary | ICD-10-CM

## 2019-06-19 PROCEDURE — 97110 THERAPEUTIC EXERCISES: CPT

## 2019-06-19 PROCEDURE — 97112 NEUROMUSCULAR REEDUCATION: CPT

## 2019-06-19 NOTE — THERAPY TREATMENT NOTE
"    Outpatient Physical Therapy Ortho Treatment Note  [facility]     Patient Name: Anthony Hernandez  : 1940  MRN: [id number]  Today's Date: 2019      Visit Date: 2019    Visit Dx:    ICD-10-CM ICD-9-CM   1. Chronic left-sided low back pain with left-sided sciatica M54.42 724.2    G89.29 724.3     338.29   2. Difficulty walking R26.2 719.7       Patient Active Problem List   Diagnosis   • Aortic heart valve narrowing   • Asymptomatic carotid artery narrowing without infarction   • Cor pulmonale (CMS/HCC)   • Functional murmur   • BP (high blood pressure)   • Lumbar radiculopathy   • CN (constipation)   • DDD (degenerative disc disease), lumbar   • Fatigue   • HLD (hyperlipidemia)   • Amnesia   • ZAIN (obstructive sleep apnea)   • Loud breathing during sleep   • Diabetes mellitus type 2, noninsulin dependent (CMS/HCC)   • Multiple thyroid nodules   • Spinal stenosis of lumbar region with neurogenic claudication   • Difficulty walking   • Hearing loss of both ears   • Episodic confusion   • Altered mental status   • Late onset Alzheimer's disease with behavioral disturbance   • Dementia without behavioral disturbance   • Hypothyroidism   • Acute intractable headache   • Herniation of lumbar intervertebral disc with radiculopathy   • Halitosis        Past Medical History:   Diagnosis Date   • Acute myocardial infarction (CMS/HCC)    • Anesthesia complication     DIFFICULTY COMING OUT OF SEDATION   • Aortic valve sclerosis    • Coronary artery disease     History of remote anterior wall myocardial infarction in .   • Diabetes mellitus (CMS/HCC)    • Disease of thyroid gland    • Erectile dysfunction    • Fall     fell and was seen in the ED, \"his leg gave out on him\"   • Hearing difficulty of both ears     Normally wears hearing aids   • Heart attack (CMS/HCC)    • Heart attack (CMS/HCC)    • Heart murmur    • Hyperlipidemia    • Intermittent claudication (CMS/HCC)    • Low back pain    • " Osteoarthritis    • Peripheral neuropathy    • Peripheral vascular disease (CMS/HCC)    • Skin cancer    • Sleep apnea     cpap, can't use due to drooling   • Spinal stenosis         Past Surgical History:   Procedure Laterality Date   • ATHERECTOMY Left     BRANT FEMORAL-POPLITEAL INITIAL STENOSIS WITH ATHERECTOMY AND STENT LEFT   • CATARACT EXTRACTION, BILATERAL     • COLONOSCOPY     • COLONOSCOPY W/ POLYPECTOMY      BENIGN   • CORONARY ANGIOPLASTY WITH STENT PLACEMENT     • CORONARY ARTERY BYPASS GRAFT  04/2010    cabg x 3: LIMA to LAD, vein graft to RCA and OM1   • EPIDURAL  01/2019   • LUMBAR LAMINECTOMY DISCECTOMY DECOMPRESSION Bilateral 6/27/2017    Procedure: L3/4, L4/5 OPEN LUMBAR DECOMPRESSION POSTERIOR 1-2 LEVELS;  Surgeon: Ranjeet Contreras MD;  Location: [facility];  Service:    • SPINE SURGERY     • THROMBOENDARTERECTOMY Left     NEUROLOGICAL SURGERY CAROTID ENDARTERECTOMY LEFT                       PT Assessment/Plan     Row Name 06/19/19 1836          PT Assessment    Assessment Comments  Pt continues to require max cues for posture, exercise performance, and counting repetitions. 2 seated rest breaks today in between standing activiites. Added retro walking to encouraged lumbar/thoracic extension. Noted fatigue in LLE knee extension today during second set of LAQ.   -LB        PT Plan    PT Plan Comments  Continue to progress gait and LE stabilization exercises as able. COntinue cuing for posture.   -LB       User Key  (r) = Recorded By, (t) = Taken By, (c) = Cosigned By    Initials Name Provider Type    LB Awilda Agosto, PT Physical Therapist            Exercises     Row Name 06/19/19 1800             Subjective Comments    Subjective Comments  I am doing ok. I was worked last time.  -LB         Subjective Pain    Able to rate subjective pain?  yes  -LB      Pre-Treatment Pain Level  2  -LB      Subjective Pain Comment  I have a little back pain.  -LB         Total Minutes    35811 - PT  Therapeutic Exercise Minutes  30  -LB      24251 -  PT Neuromuscular Reeducation Minutes  15  -LB         Exercise 1    Exercise Name 1  seated HS stretch  -LB      Reps 1  3  -LB      Time 1  20  -LB         Exercise 2    Exercise Name 2  standing HS curl  -LB      Reps 2  10  -LB      Additional Comments  B; improved comprehension  -LB         Exercise 3    Exercise Name 3  nu step  -LB      Cueing 3  Verbal  -LB      Time 3  5 min  -LB      Additional Comments  lvl 5; UE/LE  -LB         Exercise 4    Exercise Name 4  HL Hip abd w/ RTB  -LB      Sets 4  2  -LB      Reps 4  10  -LB         Exercise 5    Exercise Name 5  HL hip add w/ pink ball  -LB      Sets 5  2  -LB      Reps 5  10  -LB      Time 5  5s  -LB         Exercise 6    Exercise Name 6  LTR  -LB      Cueing 6  Verbal  -LB      Reps 6  10  -LB      Additional Comments  each side  -LB         Exercise 7    Exercise Name 7  LAQ  -LB      Cueing 7  Verbal  -LB      Reps 7  20 B  -LB      Additional Comments  3#  -LB         Exercise 8    Exercise Name 8  Row w/ RTB  -LB      Cueing 8  Verbal;Tactile;Demo  -LB      Sets 8  2  -LB      Reps 8  10  -LB      Additional Comments  cues for upright posture, tactile cues for scap retrction  -LB         Exercise 9    Exercise Name 9  retro walk at ballet bars  -LB      Cueing 9  Verbal  -LB      Reps 9  2 laps  -LB      Additional Comments  cues for upright posture; CGA with gait belt; one UE support; rest break between laps  -LB         Exercise 10    Exercise Name 10  side steps at ballet bar  -LB      Cueing 10  Verbal  -LB      Reps 10  2 laps  -LB      Additional Comments  max cues for upright posture and form; CGA with gait belt; B UE support; rest break between laps; blocking mirror with hand  -LB         Exercise 11    Exercise Name 11  seated HS curl w/ RTB  -LB      Cueing 11  Verbal  -LB      Sets 11  2  -LB      Reps 11  10  -LB      Additional Comments  B RTB  -LB         Exercise 12    Exercise Name 12   wall wash   -LB      Reps 12  10  -LB         Exercise 13    Exercise Name 13  HR  -LB      Reps 13  15  -LB      Additional Comments  B  -LB        User Key  (r) = Recorded By, (t) = Taken By, (c) = Cosigned By    Initials Name Provider Type    Awilda Reagan, PT Physical Therapist                       PT OP Goals     Row Name 06/19/19 1800          PT Short Term Goals    STG Date to Achieve  07/01/19  -LB     STG 1  Patient will be independent with education for symptom management and initial HEP to decrease LBP pain.  -LB     STG 1 Progress  Ongoing  -LB     STG 2  Pt will improve B LE strength to at least 4+/5.  -LB     STG 2 Progress  Ongoing  -LB     STG 3  Pt will improve lumbar ROM to WNL with </=3/10 pain in all cardinal planes for improved mobility and participation in ADLs.  -LB     STG 3 Progress  Ongoing  -LB        Long Term Goals    LTG Date to Achieve  07/29/19  -LB     LTG 1  Patient will be independent with education for symptom management and advanced HEP to decrease LBP pain.  -LB     LTG 1 Progress  New  -LB     LTG 2  Patient will reduce level of percieved disability as measured by the Modified Oswestry  from 50% disability to </= 20% disability in order to improve quality of life.  -LB     LTG 2 Progress  New  -LB     LTG 3  Patient will improve walking tolerance to 30 min without LBP to improve participation in community activities.  -LB     LTG 3 Progress  New  -LB       User Key  (r) = Recorded By, (t) = Taken By, (c) = Cosigned By    Initials Name Provider Type    Awilda Reagan PT Physical Therapist          Therapy Education  Given: Posture/body mechanics  Program: Reinforced  How Provided: Verbal, Demonstration  Provided to: Patient  Level of Understanding: Teach back education performed, Verbalized              Time Calculation:   Start Time: 1745  Stop Time: 1830  Time Calculation (min): 45 min  Total Timed Code Minutes- PT: 43 minute(s)  Therapy Charges for Today     Code  Description Service Date Service Provider Modifiers Qty    20678161696 HC PT NEUROMUSC RE EDUCATION EA 15 MIN 6/19/2019 Awilda Agosto, PT GP 1    [id number] HC PT THER PROC EA 15 MIN 6/19/2019 Awilda Agosto, PT GP 2                    Awilda Agosto, PT  6/19/2019

## 2019-06-24 ENCOUNTER — HOSPITAL ENCOUNTER (OUTPATIENT)
Dept: PHYSICAL THERAPY | Facility: HOSPITAL | Age: 79
Setting detail: THERAPIES SERIES
Discharge: HOME | End: 2019-06-24

## 2019-06-24 DIAGNOSIS — R26.2 DIFFICULTY WALKING: ICD-10-CM

## 2019-06-24 DIAGNOSIS — M54.42 CHRONIC LEFT-SIDED LOW BACK PAIN WITH LEFT-SIDED SCIATICA: Primary | ICD-10-CM

## 2019-06-24 DIAGNOSIS — G89.29 CHRONIC LEFT-SIDED LOW BACK PAIN WITH LEFT-SIDED SCIATICA: Primary | ICD-10-CM

## 2019-06-24 PROCEDURE — 97116 GAIT TRAINING THERAPY: CPT

## 2019-06-24 PROCEDURE — 97110 THERAPEUTIC EXERCISES: CPT

## 2019-06-24 NOTE — THERAPY TREATMENT NOTE
"    Outpatient Physical Therapy Ortho Treatment Note  [facility]     Patient Name: Anthony Hernandze  : 1940  MRN: [id number]  Today's Date: 2019      Visit Date: 2019    Visit Dx:    ICD-10-CM ICD-9-CM   1. Chronic left-sided low back pain with left-sided sciatica M54.42 724.2    G89.29 724.3     338.29   2. Difficulty walking R26.2 719.7       Patient Active Problem List   Diagnosis   • Aortic heart valve narrowing   • Asymptomatic carotid artery narrowing without infarction   • Cor pulmonale (CMS/HCC)   • Functional murmur   • BP (high blood pressure)   • Lumbar radiculopathy   • CN (constipation)   • DDD (degenerative disc disease), lumbar   • Fatigue   • HLD (hyperlipidemia)   • Amnesia   • ZAIN (obstructive sleep apnea)   • Loud breathing during sleep   • Diabetes mellitus type 2, noninsulin dependent (CMS/HCC)   • Multiple thyroid nodules   • Spinal stenosis of lumbar region with neurogenic claudication   • Difficulty walking   • Hearing loss of both ears   • Episodic confusion   • Altered mental status   • Late onset Alzheimer's disease with behavioral disturbance   • Dementia without behavioral disturbance   • Hypothyroidism   • Acute intractable headache   • Herniation of lumbar intervertebral disc with radiculopathy   • Halitosis        Past Medical History:   Diagnosis Date   • Acute myocardial infarction (CMS/HCC)    • Anesthesia complication     DIFFICULTY COMING OUT OF SEDATION   • Aortic valve sclerosis    • Coronary artery disease     History of remote anterior wall myocardial infarction in .   • Diabetes mellitus (CMS/HCC)    • Disease of thyroid gland    • Erectile dysfunction    • Fall     fell and was seen in the ED, \"his leg gave out on him\"   • Hearing difficulty of both ears     Normally wears hearing aids   • Heart attack (CMS/HCC)    • Heart attack (CMS/HCC)    • Heart murmur    • Hyperlipidemia    • Intermittent claudication (CMS/HCC)    • Low back pain    • " "Osteoarthritis    • Peripheral neuropathy    • Peripheral vascular disease (CMS/HCC)    • Skin cancer    • Sleep apnea     cpap, can't use due to drooling   • Spinal stenosis         Past Surgical History:   Procedure Laterality Date   • ATHERECTOMY Left     BRANT FEMORAL-POPLITEAL INITIAL STENOSIS WITH ATHERECTOMY AND STENT LEFT   • CATARACT EXTRACTION, BILATERAL     • COLONOSCOPY     • COLONOSCOPY W/ POLYPECTOMY      BENIGN   • CORONARY ANGIOPLASTY WITH STENT PLACEMENT     • CORONARY ARTERY BYPASS GRAFT  04/2010    cabg x 3: LIMA to LAD, vein graft to RCA and OM1   • EPIDURAL  01/2019   • LUMBAR LAMINECTOMY DISCECTOMY DECOMPRESSION Bilateral 6/27/2017    Procedure: L3/4, L4/5 OPEN LUMBAR DECOMPRESSION POSTERIOR 1-2 LEVELS;  Surgeon: Ranjeet Contreras MD;  Location: [facility];  Service:    • SPINE SURGERY     • THROMBOENDARTERECTOMY Left     NEUROLOGICAL SURGERY CAROTID ENDARTERECTOMY LEFT                       PT Assessment/Plan     Row Name 06/24/19 1408          PT Assessment    Assessment Comments  Pt continues to require max cues for posture and to avoid hands clasped behind back. At end of session pt posture much improved and able to maintain arm swing for exiting clinic. Wife reports pt does \"exercise per his report\" at home but unsure if HEP is being performed. Pt required increased seated rest breaks today (x3).   -LB        PT Plan    PT Plan Comments  Continue LE/core strengthening as able. Encourage upright posture, arm swing.   -LB       User Key  (r) = Recorded By, (t) = Taken By, (c) = Cosigned By    Initials Name Provider Type    LB Awilda Agosto, PT Physical Therapist            Exercises     Row Name 06/24/19 1400             Subjective Comments    Subjective Comments  I am doing ok. I have been working out at home.  -LB         Subjective Pain    Able to rate subjective pain?  yes  -LB      Pre-Treatment Pain Level  2  -LB         Total Minutes    68853 - Gait Training Minutes   15  -LB      " 66716 - PT Therapeutic Exercise Minutes  30  -LB         Exercise 1    Exercise Name 1  seated HS stretch  -LB      Reps 1  3  -LB      Time 1  20  -LB         Exercise 2    Exercise Name 2  standing HS curl  -LB      Reps 2  10  -LB      Additional Comments  B  -LB         Exercise 3    Exercise Name 3  nu step  -LB      Cueing 3  Verbal  -LB      Time 3  5 min  -LB      Additional Comments  lvl 5; UE/LE  -LB         Exercise 4    Exercise Name 4  HL Hip abd  -LB      Sets 4  2  -LB      Reps 4  10  -LB      Additional Comments  GTB  -LB         Exercise 5    Exercise Name 5  HL hip add w/ pink ball  -LB      Sets 5  2  -LB      Reps 5  10  -LB      Time 5  5s  -LB         Exercise 6    Exercise Name 6  LTR  -LB      Cueing 6  Verbal  -LB      Reps 6  10  -LB      Additional Comments  each side  -LB         Exercise 7    Exercise Name 7  LAQ  -LB      Cueing 7  Verbal  -LB      Reps 7  20 B  -LB      Additional Comments  3#  -LB         Exercise 8    Exercise Name 8  Row w/ RTB  -LB      Cueing 8  Verbal;Tactile;Demo  -LB      Sets 8  2  -LB      Reps 8  10  -LB         Exercise 9    Exercise Name 9  retro walk at ballet bars  -LB      Cueing 9  Verbal  -LB      Reps 9  2 laps  -LB      Additional Comments  cues for upright posture; CGA with gait belt; one UE support; rest break between laps  -LB         Exercise 10    Exercise Name 10  side steps at ballet bar  -LB      Cueing 10  Verbal  -LB      Reps 10  2 laps  -LB      Additional Comments  max cues for upright posture and form; CGA with gait belt; B UE support; rest break between laps; blocking mirror with hand  -LB         Exercise 11    Exercise Name 11  seated HS curl w/ RTB  -LB      Cueing 11  Verbal  -LB      Sets 11  2  -LB      Reps 11  10  -LB      Additional Comments  B GTB  -LB         Exercise 12    Exercise Name 12  wall wash   -LB      Reps 12  10  -LB         Exercise 13    Exercise Name 13  HR  -LB      Sets 13  2  -LB      Reps 13  10  -LB         User Key  (r) = Recorded By, (t) = Taken By, (c) = Cosigned By    Initials Name Provider Type    Awilda Reagan, PT Physical Therapist                       PT OP Goals     Row Name 06/24/19 1400          PT Short Term Goals    STG Date to Achieve  07/01/19  -LB     STG 1  Patient will be independent with education for symptom management and initial HEP to decrease LBP pain.  -LB     STG 1 Progress  Ongoing  -LB     STG 2  Pt will improve B LE strength to at least 4+/5.  -LB     STG 2 Progress  Ongoing  -LB     STG 3  Pt will improve lumbar ROM to WNL with </=3/10 pain in all cardinal planes for improved mobility and participation in ADLs.  -LB     STG 3 Progress  Ongoing  -LB        Long Term Goals    LTG Date to Achieve  07/29/19  -LB     LTG 1  Patient will be independent with education for symptom management and advanced HEP to decrease LBP pain.  -LB     LTG 1 Progress  New  -LB     LTG 2  Patient will reduce level of percieved disability as measured by the Modified Oswestry  from 50% disability to </= 20% disability in order to improve quality of life.  -LB     LTG 2 Progress  New  -LB     LTG 3  Patient will improve walking tolerance to 30 min without LBP to improve participation in community activities.  -LB     LTG 3 Progress  New  -LB       User Key  (r) = Recorded By, (t) = Taken By, (c) = Cosigned By    Initials Name Provider Type    Awilda Reagan, BING Physical Therapist          Therapy Education  Education Details: continued to encourage arm swing with arms at sides vs. behind back and clasped, upright posture  Given: Symptoms/condition management, Posture/body mechanics  Program: Reinforced  How Provided: Verbal, Demonstration  Provided to: Patient  Level of Understanding: Teach back education performed, Demonstrated, Verbalized              Time Calculation:   Start Time: 1300  Stop Time: 1345  Time Calculation (min): 45 min  Total Timed Code Minutes- PT: 43 minute(s)  Therapy Charges for  Today     Code Description Service Date Service Provider Modifiers Qty    88832065851 HC PT THER PROC EA 15 MIN 6/24/2019 Awilda Agosto, PT GP 2    68060445514 HC GAIT TRAINING EA 15 MIN 6/24/2019 Awilda Agosto, PT GP 1                    Awilda Agosto, PT  6/24/2019

## 2019-06-28 ENCOUNTER — HOSPITAL ENCOUNTER (OUTPATIENT)
Dept: PHYSICAL THERAPY | Facility: HOSPITAL | Age: 79
Setting detail: THERAPIES SERIES
Discharge: HOME | End: 2019-06-28

## 2019-06-28 DIAGNOSIS — G89.29 CHRONIC LEFT-SIDED LOW BACK PAIN WITH LEFT-SIDED SCIATICA: Primary | ICD-10-CM

## 2019-06-28 DIAGNOSIS — R26.2 DIFFICULTY WALKING: ICD-10-CM

## 2019-06-28 DIAGNOSIS — M54.42 CHRONIC LEFT-SIDED LOW BACK PAIN WITH LEFT-SIDED SCIATICA: Primary | ICD-10-CM

## 2019-06-28 PROCEDURE — 97110 THERAPEUTIC EXERCISES: CPT

## 2019-06-28 PROCEDURE — 97116 GAIT TRAINING THERAPY: CPT

## 2019-06-28 NOTE — THERAPY TREATMENT NOTE
"    Outpatient Physical Therapy Ortho Treatment Note  [facility]     Patient Name: Anthony Hernandez  : 1940  MRN: [id number]  Today's Date: 2019      Visit Date: 2019    Visit Dx:    ICD-10-CM ICD-9-CM   1. Chronic left-sided low back pain with left-sided sciatica M54.42 724.2    G89.29 724.3     338.29   2. Difficulty walking R26.2 719.7       Patient Active Problem List   Diagnosis   • Aortic heart valve narrowing   • Asymptomatic carotid artery narrowing without infarction   • Cor pulmonale (CMS/HCC)   • Functional murmur   • BP (high blood pressure)   • Lumbar radiculopathy   • CN (constipation)   • DDD (degenerative disc disease), lumbar   • Fatigue   • HLD (hyperlipidemia)   • Amnesia   • ZAIN (obstructive sleep apnea)   • Loud breathing during sleep   • Diabetes mellitus type 2, noninsulin dependent (CMS/HCC)   • Multiple thyroid nodules   • Spinal stenosis of lumbar region with neurogenic claudication   • Difficulty walking   • Hearing loss of both ears   • Episodic confusion   • Altered mental status   • Late onset Alzheimer's disease with behavioral disturbance   • Dementia without behavioral disturbance   • Hypothyroidism   • Acute intractable headache   • Herniation of lumbar intervertebral disc with radiculopathy   • Halitosis        Past Medical History:   Diagnosis Date   • Acute myocardial infarction (CMS/HCC)    • Anesthesia complication     DIFFICULTY COMING OUT OF SEDATION   • Aortic valve sclerosis    • Coronary artery disease     History of remote anterior wall myocardial infarction in .   • Diabetes mellitus (CMS/HCC)    • Disease of thyroid gland    • Erectile dysfunction    • Fall     fell and was seen in the ED, \"his leg gave out on him\"   • Hearing difficulty of both ears     Normally wears hearing aids   • Heart attack (CMS/HCC)    • Heart attack (CMS/HCC)    • Heart murmur    • Hyperlipidemia    • Intermittent claudication (CMS/HCC)    • Low back pain    • " Osteoarthritis    • Peripheral neuropathy    • Peripheral vascular disease (CMS/HCC)    • Skin cancer    • Sleep apnea     cpap, can't use due to drooling   • Spinal stenosis         Past Surgical History:   Procedure Laterality Date   • ATHERECTOMY Left     BRANT FEMORAL-POPLITEAL INITIAL STENOSIS WITH ATHERECTOMY AND STENT LEFT   • CATARACT EXTRACTION, BILATERAL     • COLONOSCOPY     • COLONOSCOPY W/ POLYPECTOMY      BENIGN   • CORONARY ANGIOPLASTY WITH STENT PLACEMENT     • CORONARY ARTERY BYPASS GRAFT  04/2010    cabg x 3: LIMA to LAD, vein graft to RCA and OM1   • EPIDURAL  01/2019   • LUMBAR LAMINECTOMY DISCECTOMY DECOMPRESSION Bilateral 6/27/2017    Procedure: L3/4, L4/5 OPEN LUMBAR DECOMPRESSION POSTERIOR 1-2 LEVELS;  Surgeon: Ranjeet Contreras MD;  Location: [facility];  Service:    • SPINE SURGERY     • THROMBOENDARTERECTOMY Left     NEUROLOGICAL SURGERY CAROTID ENDARTERECTOMY LEFT                       PT Assessment/Plan     Row Name 06/28/19 1436          PT Assessment    Assessment Comments  Pt with some carryover from last session with improved lateral walking posture and no incidence of hands clasped behind his back today during session. Pt continues to fatigue quickly but reduced time spent seated and resting today. Pt also able to perform 20 repetitions of seated LAQ without rest today.   -LB        PT Plan    PT Plan Comments  Continue dynamic balance/stability traning, LE/core strengthening.   -LB       User Key  (r) = Recorded By, (t) = Taken By, (c) = Cosigned By    Initials Name Provider Type    Awilda Reagan, PT Physical Therapist            Exercises     Row Name 06/28/19 1300             Subjective Comments    Subjective Comments  I am doing well. Nothing hurts.  -LB         Subjective Pain    Able to rate subjective pain?  yes  -LB      Pre-Treatment Pain Level  2  -LB         Total Minutes    84226 - Gait Training Minutes   15  -LB      23893 - PT Therapeutic Exercise Minutes  30  -LB          Exercise 1    Exercise Name 1  seated HS stretch  -LB      Reps 1  3  -LB      Time 1  20  -LB         Exercise 2    Exercise Name 2  standing HS curl  -LB      Reps 2  10  -LB      Additional Comments  B  -LB         Exercise 3    Exercise Name 3  nu step  -LB      Cueing 3  Verbal  -LB      Time 3  5 min  -LB      Additional Comments  lvl 5; UE/LE  -LB         Exercise 4    Exercise Name 4  HL Hip abd  -LB      Sets 4  2  -LB      Reps 4  10  -LB      Additional Comments  GTB  -LB         Exercise 5    Exercise Name 5  HL hip add w/ pink ball  -LB      Sets 5  2  -LB      Reps 5  10  -LB      Time 5  5s  -LB         Exercise 6    Exercise Name 6  LTR  -LB      Cueing 6  Verbal  -LB      Reps 6  10  -LB      Additional Comments  each side  -LB         Exercise 7    Exercise Name 7  LAQ  -LB      Cueing 7  Verbal  -LB      Reps 7  20 B  -LB      Additional Comments  3#  -LB         Exercise 8    Exercise Name 8  Row w/ RTB  -LB      Cueing 8  Verbal;Tactile;Demo  -LB      Sets 8  2  -LB      Reps 8  10  -LB         Exercise 9    Exercise Name 9  retro walk at ballet bars  -LB      Cueing 9  Verbal  -LB      Reps 9  2 laps  -LB      Additional Comments  cues for upright posture; CGA with gait belt; one UE support; rest break between laps  -LB         Exercise 10    Exercise Name 10  side steps at ballet bar  -LB      Cueing 10  Verbal  -LB      Reps 10  2 laps  -LB      Additional Comments  max cues for upright posture and form; CGA with gait belt; B UE support; rest break between laps; blocking mirror with hand  -LB         Exercise 11    Exercise Name 11  seated HS curl w/ RTB  -LB      Cueing 11  Verbal  -LB      Sets 11  2  -LB      Reps 11  10  -LB      Additional Comments  B GTB  -LB         Exercise 12    Exercise Name 12  wall wash   -LB      Reps 12  10  -LB         Exercise 13    Exercise Name 13  HR  -LB      Sets 13  2  -LB      Reps 13  10  -LB         Exercise 14    Exercise Name 14  STS + B OH  flexion  -LB      Reps 14  5  -LB        User Key  (r) = Recorded By, (t) = Taken By, (c) = Cosigned By    Initials Name Provider Type    Awilda Reagan, PT Physical Therapist                       PT OP Goals     Row Name 06/28/19 1400          PT Short Term Goals    STG Date to Achieve  07/01/19  -LB     STG 1  Patient will be independent with education for symptom management and initial HEP to decrease LBP pain.  -LB     STG 1 Progress  Ongoing  -LB     STG 2  Pt will improve B LE strength to at least 4+/5.  -LB     STG 2 Progress  Ongoing  -LB     STG 3  Pt will improve lumbar ROM to WNL with </=3/10 pain in all cardinal planes for improved mobility and participation in ADLs.  -LB     STG 3 Progress  Ongoing  -LB        Long Term Goals    LTG Date to Achieve  07/29/19  -LB     LTG 1  Patient will be independent with education for symptom management and advanced HEP to decrease LBP pain.  -LB     LTG 1 Progress  New  -LB     LTG 2  Patient will reduce level of percieved disability as measured by the Modified Oswestry  from 50% disability to </= 20% disability in order to improve quality of life.  -LB     LTG 2 Progress  New  -LB     LTG 3  Patient will improve walking tolerance to 30 min without LBP to improve participation in community activities.  -LB     LTG 3 Progress  New  -LB       User Key  (r) = Recorded By, (t) = Taken By, (c) = Cosigned By    Initials Name Provider Type    Awilda Reagan PT Physical Therapist          Therapy Education  Education Details: continue to reinforce upright posture, arm swing  Given: Posture/body mechanics  Program: Reinforced  How Provided: Verbal, Demonstration  Provided to: Patient  Level of Understanding: Teach back education performed, Verbalized, Demonstrated              Time Calculation:   Start Time: 1315  Stop Time: 1400  Time Calculation (min): 45 min  Total Timed Code Minutes- PT: 43 minute(s)  Therapy Charges for Today     Code Description Service Date  Service Provider Modifiers Qty    53801740553 HC PT THER PROC EA 15 MIN 6/28/2019 Awilda Agosto, PT GP 2    43433075909 HC GAIT TRAINING EA 15 MIN 6/28/2019 Awilda Agosto, PT GP 1                    Awilda Agosto, PT  6/28/2019

## 2019-07-08 RX ORDER — GABAPENTIN 300 MG/1
300 CAPSULE ORAL 3 TIMES DAILY
Qty: 90 CAPSULE | Refills: 3 | Status: SHIPPED | OUTPATIENT
Start: 2019-07-08 | End: 2019-07-16 | Stop reason: SDUPTHER

## 2019-07-10 ENCOUNTER — OFFICE VISIT (OUTPATIENT)
Dept: INTERNAL MEDICINE | Facility: CLINIC | Age: 79
End: 2019-07-10

## 2019-07-10 VITALS
WEIGHT: 173.4 LBS | SYSTOLIC BLOOD PRESSURE: 104 MMHG | DIASTOLIC BLOOD PRESSURE: 62 MMHG | OXYGEN SATURATION: 99 % | HEIGHT: 72 IN | BODY MASS INDEX: 23.49 KG/M2 | HEART RATE: 80 BPM

## 2019-07-10 DIAGNOSIS — E11.9 DIABETES MELLITUS TYPE 2, NONINSULIN DEPENDENT (HCC): Primary | ICD-10-CM

## 2019-07-10 PROCEDURE — 99213 OFFICE O/P EST LOW 20 MIN: CPT | Performed by: FAMILY MEDICINE

## 2019-07-10 RX ORDER — ASPIRIN 81 MG/1
81 TABLET ORAL DAILY
Status: ON HOLD | COMMUNITY
End: 2021-01-01

## 2019-07-10 NOTE — PROGRESS NOTES
Subjective   Anthony Hernandez is a 79 y.o. male.     Chief Complaint   Patient presents with   • Diabetes         History of Present Illness     Patient presents today with a past medical history of type 2 diabetes.  Patient notes that he is doing well with his current medications for his diabetes.  His blood glucose levels are around 120.  Patient denies any side effects of the Januvia 100 mg daily that he is currently taking.    The following portions of the patient's history were reviewed and updated as appropriate: allergies, current medications, past family history, past medical history, past social history, past surgical history and problem list.    Review of Systems   Constitutional: Negative.    HENT: Negative.    Respiratory: Negative.    Cardiovascular: Negative.    Neurological: Negative.        Objective   Physical Exam   Constitutional: He is oriented to person, place, and time. He appears well-developed and well-nourished.   HENT:   Head: Normocephalic and atraumatic.   Eyes: EOM are normal.   Neck: Normal range of motion. Neck supple.   Cardiovascular: Normal rate, regular rhythm and normal heart sounds.   Pulmonary/Chest: Effort normal and breath sounds normal. No respiratory distress.   Neurological: He is alert and oriented to person, place, and time.   Psychiatric: He has a normal mood and affect. His behavior is normal.   Nursing note and vitals reviewed.      Assessment/Plan   Anthony was seen today for diabetes.    Diagnoses and all orders for this visit:    Diabetes mellitus type 2, noninsulin dependent (CMS/HCC)  -     Hemoglobin A1c  -     Comprehensive Metabolic Panel  -     Continue janvuia 100mg daily.           No Follow-up on file.    Dictated utilizing Dragon Voice Recognition Software

## 2019-07-11 LAB
ALBUMIN SERPL-MCNC: 4.1 G/DL (ref 3.5–5.2)
ALBUMIN/GLOB SERPL: 2 G/DL
ALP SERPL-CCNC: 92 U/L (ref 39–117)
ALT SERPL-CCNC: 9 U/L (ref 1–41)
AST SERPL-CCNC: 11 U/L (ref 1–40)
BILIRUB SERPL-MCNC: 0.3 MG/DL (ref 0.2–1.2)
BUN SERPL-MCNC: 14 MG/DL (ref 8–23)
BUN/CREAT SERPL: 13.5 (ref 7–25)
CALCIUM SERPL-MCNC: 9.3 MG/DL (ref 8.6–10.5)
CHLORIDE SERPL-SCNC: 105 MMOL/L (ref 98–107)
CO2 SERPL-SCNC: 30.9 MMOL/L (ref 22–29)
CREAT SERPL-MCNC: 1.04 MG/DL (ref 0.76–1.27)
GLOBULIN SER CALC-MCNC: 2.1 GM/DL
GLUCOSE SERPL-MCNC: 169 MG/DL (ref 65–99)
HBA1C MFR BLD: 6.3 % (ref 4.8–5.6)
POTASSIUM SERPL-SCNC: 4.7 MMOL/L (ref 3.5–5.2)
PROT SERPL-MCNC: 6.2 G/DL (ref 6–8.5)
SODIUM SERPL-SCNC: 144 MMOL/L (ref 136–145)

## 2019-07-16 RX ORDER — GABAPENTIN 300 MG/1
300 CAPSULE ORAL 3 TIMES DAILY
Qty: 42 CAPSULE | Refills: 0 | Status: ON HOLD | OUTPATIENT
Start: 2019-07-16 | End: 2019-09-25 | Stop reason: SDUPTHER

## 2019-07-16 NOTE — TELEPHONE ENCOUNTER
He is taking Gabapentin 300 mg TID , can we give him a couple week supply till his mail order comes in?    Please advise.    Patient's wife informed Dr DUMONT in surgery today therefore it maybe tomorrow before they get a response

## 2019-07-16 NOTE — TELEPHONE ENCOUNTER
Patient's wife called and wanted to know if they could get and emergency supply of gabapentin.  The [email or web address] mail pharmacy couldn't guarantee the delivery today for tomorrow and told the patient it could be as late Monday.  The emergency supply can be called into the [facility].

## 2019-07-17 NOTE — TELEPHONE ENCOUNTER
Informed patient's wife that Dr DUMONT sent 2 week supply of Gabapentin to his local pharmacy to tide him over till his mail order script comes in

## 2019-07-26 ENCOUNTER — APPOINTMENT (OUTPATIENT)
Dept: PAIN MEDICINE | Facility: HOSPITAL | Age: 79
End: 2019-07-26

## 2019-08-06 ENCOUNTER — HOSPITAL ENCOUNTER (OUTPATIENT)
Dept: PAIN MEDICINE | Facility: HOSPITAL | Age: 79
Discharge: HOME | End: 2019-08-06
Admitting: ANESTHESIOLOGY

## 2019-08-06 ENCOUNTER — ANESTHESIA (OUTPATIENT)
Dept: PAIN MEDICINE | Facility: HOSPITAL | Age: 79
End: 2019-08-06

## 2019-08-06 ENCOUNTER — HOSPITAL ENCOUNTER (OUTPATIENT)
Dept: GENERAL RADIOLOGY | Facility: HOSPITAL | Age: 79
Discharge: HOME | End: 2019-08-06

## 2019-08-06 ENCOUNTER — ANESTHESIA EVENT (OUTPATIENT)
Dept: PAIN MEDICINE | Facility: HOSPITAL | Age: 79
End: 2019-08-06

## 2019-08-06 VITALS
TEMPERATURE: 97.7 F | OXYGEN SATURATION: 99 % | DIASTOLIC BLOOD PRESSURE: 74 MMHG | HEART RATE: 75 BPM | SYSTOLIC BLOOD PRESSURE: 161 MMHG | RESPIRATION RATE: 16 BRPM

## 2019-08-06 DIAGNOSIS — R52 PAIN: ICD-10-CM

## 2019-08-06 DIAGNOSIS — M51.36 DDD (DEGENERATIVE DISC DISEASE), LUMBAR: ICD-10-CM

## 2019-08-06 DIAGNOSIS — M51.16 HERNIATION OF LUMBAR INTERVERTEBRAL DISC WITH RADICULOPATHY: ICD-10-CM

## 2019-08-06 DIAGNOSIS — M54.16 LUMBAR RADICULOPATHY: Primary | ICD-10-CM

## 2019-08-06 DIAGNOSIS — M48.062 SPINAL STENOSIS OF LUMBAR REGION WITH NEUROGENIC CLAUDICATION: ICD-10-CM

## 2019-08-06 LAB — GLUCOSE BLDC GLUCOMTR-MCNC: 120 MG/DL (ref 70–130)

## 2019-08-06 PROCEDURE — 82962 GLUCOSE BLOOD TEST: CPT

## 2019-08-06 PROCEDURE — 77003 FLUOROGUIDE FOR SPINE INJECT: CPT

## 2019-08-06 PROCEDURE — C1755 CATHETER, INTRASPINAL: HCPCS

## 2019-08-06 PROCEDURE — 0 IOPAMIDOL 41 % SOLUTION: Performed by: ANESTHESIOLOGY

## 2019-08-06 PROCEDURE — 25010000002 METHYLPREDNISOLONE PER 80 MG: Performed by: ANESTHESIOLOGY

## 2019-08-06 RX ORDER — METHYLPREDNISOLONE ACETATE 80 MG/ML
80 INJECTION, SUSPENSION INTRA-ARTICULAR; INTRALESIONAL; INTRAMUSCULAR; SOFT TISSUE ONCE
Status: COMPLETED | OUTPATIENT
Start: 2019-08-06 | End: 2019-08-06

## 2019-08-06 RX ORDER — LIDOCAINE HYDROCHLORIDE 10 MG/ML
1 INJECTION, SOLUTION INFILTRATION; PERINEURAL ONCE AS NEEDED
Status: DISCONTINUED | OUTPATIENT
Start: 2019-08-06 | End: 2019-08-07 | Stop reason: HOSPADM

## 2019-08-06 RX ORDER — SODIUM CHLORIDE 0.9 % (FLUSH) 0.9 %
1-10 SYRINGE (ML) INJECTION AS NEEDED
Status: DISCONTINUED | OUTPATIENT
Start: 2019-08-06 | End: 2019-08-07 | Stop reason: HOSPADM

## 2019-08-06 RX ORDER — FENTANYL CITRATE 50 UG/ML
50 INJECTION, SOLUTION INTRAMUSCULAR; INTRAVENOUS AS NEEDED
Status: DISCONTINUED | OUTPATIENT
Start: 2019-08-06 | End: 2019-08-07 | Stop reason: HOSPADM

## 2019-08-06 RX ORDER — MIDAZOLAM HYDROCHLORIDE 1 MG/ML
1 INJECTION INTRAMUSCULAR; INTRAVENOUS AS NEEDED
Status: DISCONTINUED | OUTPATIENT
Start: 2019-08-06 | End: 2019-08-07 | Stop reason: HOSPADM

## 2019-08-06 RX ADMIN — METHYLPREDNISOLONE ACETATE 80 MG: 80 INJECTION, SUSPENSION INTRA-ARTICULAR; INTRALESIONAL; INTRAMUSCULAR; SOFT TISSUE at 09:16

## 2019-08-06 RX ADMIN — IOPAMIDOL 3 ML: 408 INJECTION, SOLUTION INTRATHECAL at 09:16

## 2019-08-06 NOTE — H&P
"[facility]    History and Physical    Patient Name: Anthony Hernandez  :  1940  MRN:  [id number]  Date of Admission: 2019    Subjective     Patient is a 79 y.o. male presents with chief complaint of chronic, moderate, severe low back and leg: left pain.  Onset of symptoms was gradual starting several years ago.  Symptoms are associated/aggravated by nothing in particular or activity. Symptoms improve with injection.  Pt has h/o dementia, is a poor historian, & very hard of hearing.  Wife is with him.  Presents for LESI 3.  Has had 50% improvement with prior epidurals.      The following portions of the patients history were reviewed and updated as appropriate: current medications, allergies, past medical history, past surgical history, past family history, past social history and problem list                Objective     Past Medical History:   Past Medical History:   Diagnosis Date   • Acute myocardial infarction (CMS/HCC)    • Anesthesia complication     DIFFICULTY COMING OUT OF SEDATION   • Aortic valve sclerosis    • Coronary artery disease     History of remote anterior wall myocardial infarction in .   • Diabetes mellitus (CMS/HCC)    • Disease of thyroid gland    • Erectile dysfunction    • Fall     fell and was seen in the ED, \"his leg gave out on him\"   • Hearing difficulty of both ears     Normally wears hearing aids   • Heart attack (CMS/HCC)    • Heart attack (CMS/HCC)    • Heart murmur    • Hyperlipidemia    • Intermittent claudication (CMS/HCC)    • Low back pain    • Osteoarthritis    • Peripheral neuropathy    • Peripheral vascular disease (CMS/HCC)    • Skin cancer    • Sleep apnea     cpap, can't use due to drooling   • Spinal stenosis      Past Surgical History:   Past Surgical History:   Procedure Laterality Date   • ATHERECTOMY Left     BRANT FEMORAL-POPLITEAL INITIAL STENOSIS WITH ATHERECTOMY AND STENT LEFT   • CATARACT EXTRACTION, BILATERAL     • COLONOSCOPY     • COLONOSCOPY W/ " POLYPECTOMY      BENIGN   • CORONARY ANGIOPLASTY WITH STENT PLACEMENT     • CORONARY ARTERY BYPASS GRAFT  2010    cabg x 3: LIMA to LAD, vein graft to RCA and OM1   • EPIDURAL  2019   • LUMBAR LAMINECTOMY DISCECTOMY DECOMPRESSION Bilateral 2017    Procedure: L3/4, L4/5 OPEN LUMBAR DECOMPRESSION POSTERIOR 1-2 LEVELS;  Surgeon: Ranjeet Contreras MD;  Location: [facility];  Service:    • SPINE SURGERY     • THROMBOENDARTERECTOMY Left     NEUROLOGICAL SURGERY CAROTID ENDARTERECTOMY LEFT     Family History:   Family History   Problem Relation Age of Onset   • Breast cancer Mother    • Cancer Mother    • Brain cancer Sister    • Other Brother         CABG   • Stroke Brother    • Heart disease Brother    • Heart attack Brother    • Malig Hyperthermia Neg Hx      Social History:   Social History     Tobacco Use   • Smoking status: Former Smoker     Packs/day: 1.00     Years: 20.00     Pack years: 20.00     Last attempt to quit:      Years since quittin.6   • Smokeless tobacco: Never Used   Substance Use Topics   • Alcohol use: No   • Drug use: No       Vital Signs Range for the last 24 hours  Temperature:     Temp Source:     BP:     Pulse:     Respirations:     SPO2:     O2 Amount (l/min):     O2 Devices     Weight:           --------------------------------------------------------------------------------    Current Outpatient Medications   Medication Sig Dispense Refill   • aspirin 81 MG EC tablet Take 81 mg by mouth Daily.     • atorvastatin (LIPITOR) 80 MG tablet TAKE 1 TABLET EVERY DAY 90 tablet 1   • Ferrous Sulfate 27 MG tablet Take 27 mg by mouth Every Night.     • gabapentin (NEURONTIN) 300 MG capsule Take 1 capsule by mouth 3 (Three) Times a Day. Enough to last till mail order comes in 42 capsule 0   • levothyroxine (SYNTHROID, LEVOTHROID) 75 MCG tablet TAKE 1 TABLET EVERY DAY 90 tablet 1   • memantine (NAMENDA) 10 MG tablet Take 5 mg by mouth 2 (Two) Times a Day.     • SITagliptin  (JANUVIA) 100 MG tablet Take 1 tablet by mouth Daily. 90 tablet 1   • traMADol (ULTRAM) 50 MG tablet Take 1 tablet by mouth Daily As Needed for Moderate Pain . 30 tablet 2   • Lancets (ONETOUCH ULTRASOFT) lancets Daily fasting glucose 100 each 12   • ONE TOUCH ULTRA TEST test strip 1 each by Other route Daily. 100 each 5     No current facility-administered medications for this encounter.        --------------------------------------------------------------------------------  Assessment/Plan      Anesthesia Evaluation     Patient summary reviewed and Nursing notes reviewed   NPO Solid Status: > 8 hours             Airway   Mallampati: II  TM distance: >3 FB  Dental - normal exam     Pulmonary - normal exam   (+) sleep apnea,   Cardiovascular - normal exam  Exercise tolerance: good (4-7 METS)    Rhythm: regular    (+) hypertension, valvular problems/murmurs, past MI , CAD, CABG, PVD, hyperlipidemia,  carotid artery disease      Neuro/Psych- neuro exam normal  (+) headaches, numbness, psychiatric history, dementia,     GI/Hepatic/Renal/Endo    (+)   diabetes mellitus, hypothyroidism,     Musculoskeletal (-) normal exam    (+) back pain, chronic pain, radiculopathy  Abdominal  - normal exam   Substance History - negative use     OB/GYN negative ob/gyn ROS         Other   (+) arthritis   history of cancer               Diagnosis and Plan    Treatment Plan  ASA 3   Patient has had previous injection/procedure with 25-50% improvement.   Procedures: Lumbar Epidural Steroid Injection(LESI), With fluoroscopy,       Anesthetic plan and risks discussed with patient.          Diagnosis     * DDD (degenerative disc disease), lumbar [M51.36]     * Lumbar radiculopathy [M54.16]

## 2019-08-06 NOTE — ANESTHESIA PROCEDURE NOTES
PAIN Epidural block    Pre-sedation assessment completed: 8/6/2019 9:07 AM    Patient reassessed immediately prior to procedure    Patient location during procedure: pain clinic  Start Time: 8/6/2019 9:12 AM  Stop Time: 8/6/2019 9:17 AM  Indication:procedure for pain  Performed By  Anesthesiologist: Rekha Reyes MD  Preanesthetic Checklist  Completed: patient identified, site marked, surgical consent, pre-op evaluation, timeout performed, IV checked, risks and benefits discussed and monitors and equipment checked  Additional Notes  Lumbar: radiculopathy, degeneration  Fluoro used   Prep:  Pt Position:prone  Sterile Tech:cap, gloves, mask and sterile barrier  Prep:chlorhexidine gluconate and isopropyl alcohol  Monitoring:blood pressure monitoring, continuous pulse oximetry and EKG  Procedure:Sedation: no     Approach:left paramedian  Guidance: fluoroscopy  Location:lumbar  Level:3-4  Needle Type:Tuohy  Needle Gauge:20  Aspiration:negative  Medications:  Depomedrol:80  Preservative Free Saline:3mL  Isovue:3mL  Comments:B/l spread  Post Assessment:  Dressing:occlusive dressing applied  Pt Tolerance:patient tolerated the procedure well with no apparent complications  Complications:no

## 2019-08-14 RX ORDER — LEVOTHYROXINE SODIUM 0.07 MG/1
TABLET ORAL
Qty: 90 TABLET | Refills: 1 | Status: ON HOLD | OUTPATIENT
Start: 2019-08-14 | End: 2021-01-01

## 2019-08-19 ENCOUNTER — OFFICE VISIT (OUTPATIENT)
Dept: INTERNAL MEDICINE | Facility: CLINIC | Age: 79
End: 2019-08-19

## 2019-08-19 VITALS
BODY MASS INDEX: 22.36 KG/M2 | TEMPERATURE: 97.7 F | SYSTOLIC BLOOD PRESSURE: 112 MMHG | DIASTOLIC BLOOD PRESSURE: 64 MMHG | OXYGEN SATURATION: 98 % | RESPIRATION RATE: 18 BRPM | HEART RATE: 77 BPM | WEIGHT: 165.1 LBS | HEIGHT: 72 IN

## 2019-08-19 DIAGNOSIS — R42 VERTIGO: Primary | ICD-10-CM

## 2019-08-19 DIAGNOSIS — E11.9 DIABETES MELLITUS TYPE 2, NONINSULIN DEPENDENT (HCC): ICD-10-CM

## 2019-08-19 LAB — GLUCOSE BLDC GLUCOMTR-MCNC: 171 MG/DL (ref 70–130)

## 2019-08-19 PROCEDURE — 82962 GLUCOSE BLOOD TEST: CPT | Performed by: FAMILY MEDICINE

## 2019-08-19 PROCEDURE — 99213 OFFICE O/P EST LOW 20 MIN: CPT | Performed by: FAMILY MEDICINE

## 2019-08-19 RX ORDER — MECLIZINE HCL 12.5 MG/1
12.5 TABLET ORAL 3 TIMES DAILY PRN
Qty: 45 TABLET | Refills: 1 | Status: SHIPPED | OUTPATIENT
Start: 2019-08-19 | End: 2019-08-19 | Stop reason: SDUPTHER

## 2019-08-19 RX ORDER — MECLIZINE HCL 12.5 MG/1
12.5 TABLET ORAL 3 TIMES DAILY PRN
Qty: 45 TABLET | Refills: 1 | Status: SHIPPED | OUTPATIENT
Start: 2019-08-19 | End: 2019-09-17 | Stop reason: SDUPTHER

## 2019-08-21 NOTE — PROGRESS NOTES
Subjective   Anthony Hernandez is a 79 y.o. male.     Chief Complaint   Patient presents with   • Dizziness     has been a week for about first 3hrs every morning         History of Present Illness     Patient presents to today's office visit for concern that the room is spinning.  Patient is not sure why he feels this way.  Patient states that he is not sure if is related to diabetes.  Patient states is been going on for about a week.  If particular worse in the morning.  Last for few hours.  Patient states that it is not depending on his position.  Is currently not doing anything for this.  She does have bad dementia, and is present with his wife who is the historian.    The following portions of the patient's history were reviewed and updated as appropriate: allergies, current medications, past family history, past medical history, past social history, past surgical history and problem list.    Review of Systems   Constitutional: Negative.    HENT: Negative.    Respiratory: Negative.    Cardiovascular: Negative.    Neurological: Positive for dizziness.   Psychiatric/Behavioral: Negative.        Objective   Physical Exam   Constitutional: He is oriented to person, place, and time. He appears well-developed and well-nourished.   HENT:   Head: Normocephalic and atraumatic.   Eyes: EOM are normal.   Neck: Normal range of motion. Neck supple.   Cardiovascular: Normal rate, regular rhythm and normal heart sounds.   Pulmonary/Chest: Effort normal and breath sounds normal. No respiratory distress.   Neurological: He is alert and oriented to person, place, and time.   Psychiatric: He has a normal mood and affect. His behavior is normal.   Nursing note and vitals reviewed.      Assessment/Plan   Anthony was seen today for dizziness.    Diagnoses and all orders for this visit:    Vertigo  -     Likely has some underlying vertigo, will start patient on meclizine.  -     meclizine (ANTIVERT) 12.5 MG tablet; Take 1 tablet by mouth 3  (Three) Times a Day As Needed for dizziness.    Diabetes mellitus type 2, noninsulin dependent (CMS/HCC)  -     POC Glucose  -     Hemoglobin A1c  -     Comprehensive Metabolic Panel  -     Lipid Panel With LDL / HDL Ratio          No Follow-up on file.    Dictated utilizing Dragon Voice Recognition Software

## 2019-08-29 ENCOUNTER — DOCUMENTATION (OUTPATIENT)
Dept: PHYSICAL THERAPY | Facility: HOSPITAL | Age: 79
End: 2019-08-29

## 2019-08-29 NOTE — THERAPY DISCHARGE NOTE
Outpatient Physical Therapy Discharge Summary         Patient Name: Anthony Hernandez  : 1940  MRN: [id number]    Today's Date: 2019    Visit Dx:  No diagnosis found.    PT OP Goals     Row Name 19 1700          PT Short Term Goals    STG Date to Achieve  19  -LB     STG 1  Patient will be independent with education for symptom management and initial HEP to decrease LBP pain.  -LB     STG 1 Progress  Not Met  -LB     STG 1 Progress Comments  Pt requires cuing for memory.  -LB     STG 2  Pt will improve B LE strength to at least 4+/5.  -LB     STG 2 Progress  Not Met  -LB     STG 3  Pt will improve lumbar ROM to WNL with </=3/10 pain in all cardinal planes for improved mobility and participation in ADLs.  -LB     STG 3 Progress  Not Met  -LB        Long Term Goals    LTG Date to Achieve  19  -LB     LTG 1  Patient will be independent with education for symptom management and advanced HEP to decrease LBP pain.  -LB     LTG 1 Progress  Not Met  -LB     LTG 2  Patient will reduce level of percieved disability as measured by the Modified Oswestry  from 50% disability to </= 20% disability in order to improve quality of life.  -LB     LTG 2 Progress  Not Met  -LB     LTG 3  Patient will improve walking tolerance to 30 min without LBP to improve participation in community activities.  -LB     LTG 3 Progress  Not Met  -LB       User Key  (r) = Recorded By, (t) = Taken By, (c) = Cosigned By    Initials Name Provider Type    Awilda Reagan, PT Physical Therapist          OP PT Discharge Summary  Date of Discharge: 19  Reason for Discharge: Lack of progress  Outcomes Achieved: Patient able to partially acheive established goals  Discharge Destination: Home with home program  Discharge Instructions/Additional Comments: Pt making some progress with strengthening and decreased pain levels. Did not return to PT after last session.       Time Calculation:                    Awilda Agosto  PT  8/29/2019

## 2019-08-30 LAB
ALBUMIN SERPL-MCNC: NORMAL G/DL
ALP SERPL-CCNC: NORMAL U/L
ALT SERPL-CCNC: NORMAL U/L
AST SERPL-CCNC: NORMAL U/L
BILIRUB SERPL-MCNC: NORMAL MG/DL
BUN SERPL-MCNC: NORMAL MG/DL
CALCIUM SERPL-MCNC: NORMAL MG/DL
CHLORIDE SERPL-SCNC: NORMAL MMOL/L
CHOLEST SERPL-MCNC: 125 MG/DL (ref 0–200)
CO2 SERPL-SCNC: NORMAL MMOL/L
CREAT SERPL-MCNC: NORMAL MG/DL
GLUCOSE SERPL-MCNC: NORMAL MG/DL
HBA1C MFR BLD: 7 % (ref 4.8–5.6)
HDLC SERPL-MCNC: 53 MG/DL (ref 40–60)
LDLC SERPL CALC-MCNC: 57 MG/DL (ref 0–100)
LDLC/HDLC SERPL: 1.07 {RATIO}
POTASSIUM SERPL-SCNC: NORMAL MMOL/L
PROT SERPL-MCNC: NORMAL G/DL
SODIUM SERPL-SCNC: NORMAL MMOL/L
SPECIMEN STATUS: NORMAL
TRIGL SERPL-MCNC: 77 MG/DL (ref 0–150)
VLDLC SERPL CALC-MCNC: 15.4 MG/DL

## 2019-09-04 ENCOUNTER — OFFICE VISIT (OUTPATIENT)
Dept: INTERNAL MEDICINE | Facility: CLINIC | Age: 79
End: 2019-09-04

## 2019-09-04 VITALS
DIASTOLIC BLOOD PRESSURE: 54 MMHG | SYSTOLIC BLOOD PRESSURE: 106 MMHG | OXYGEN SATURATION: 97 % | HEIGHT: 72 IN | HEART RATE: 72 BPM | BODY MASS INDEX: 22.75 KG/M2 | WEIGHT: 168 LBS

## 2019-09-04 DIAGNOSIS — E11.9 DIABETES MELLITUS TYPE 2, NONINSULIN DEPENDENT (HCC): Primary | ICD-10-CM

## 2019-09-04 PROCEDURE — 99213 OFFICE O/P EST LOW 20 MIN: CPT | Performed by: FAMILY MEDICINE

## 2019-09-05 ENCOUNTER — RESULTS ENCOUNTER (OUTPATIENT)
Dept: INTERNAL MEDICINE | Facility: CLINIC | Age: 79
End: 2019-09-05

## 2019-09-05 DIAGNOSIS — E11.9 DIABETES MELLITUS TYPE 2, NONINSULIN DEPENDENT (HCC): ICD-10-CM

## 2019-09-06 NOTE — PROGRESS NOTES
Subjective   Anthony Hernandez is a 79 y.o. male.     Chief Complaint   Patient presents with   • Follow-up     labs/ 6 month         History of Present Illness     The patient's hemoglobin A1c is continuing to rise.  He is currently taking Januvia 100 mg daily.  She denies any fight side effects of the Januvia.  His hemoglobin A1c has come back 7.  I discussed with patient that we may need to add another agent to help him with his blood glucose level.  In the past patient has not been able to tolerate metformin.    The following portions of the patient's history were reviewed and updated as appropriate: allergies, current medications, past family history, past medical history, past social history, past surgical history and problem list.    Review of Systems   Constitutional: Negative.    HENT: Negative.    Respiratory: Negative.    Cardiovascular: Negative.    Musculoskeletal: Negative.    Psychiatric/Behavioral: Negative.        Objective   Physical Exam   Constitutional: He is oriented to person, place, and time. He appears well-developed and well-nourished.   HENT:   Head: Normocephalic and atraumatic.   Eyes: EOM are normal.   Neck: Normal range of motion. Neck supple.   Cardiovascular: Normal rate, regular rhythm and normal heart sounds.   Pulmonary/Chest: Effort normal and breath sounds normal. No respiratory distress.   Neurological: He is alert and oriented to person, place, and time.   Psychiatric: He has a normal mood and affect. His behavior is normal.   Nursing note and vitals reviewed.      Assessment/Plan   Anthony was seen today for follow-up.    Diagnoses and all orders for this visit:    Diabetes mellitus type 2, noninsulin dependent (CMS/HCC)  -     Canagliflozin (INVOKANA) 100 MG tablet; Take 1 tablet by mouth Daily.  -     Hemoglobin A1c; Future  -     Comprehensive Metabolic Panel; Future  -     We will continue patient on Januvia and start Invokana.          No Follow-up on file.    Dictated  utilizing Dragon Voice Recognition Software

## 2019-09-10 NOTE — PROGRESS NOTES
Subjective   Patient ID: Anthony Hernandez is a 79 y.o. male is here today for follow-up after CRISSY # 2 and #3.  His last CRISSY was 8.6.19.  Patient states that he did get good relief with the first 2 CRISSY, but the third one made his pain worse.      Patient was last seen 4.15.19 intermittent moderate low back and left leg pain.  Worse in am and with prolonged standing and walking.  He is unsure of leg weakness.  He denies N/T.    He is taking Gabapentin 300 mg TID.  He has Tramadol at home, but is not currently taking it.    He is with his wife. He has had recurrent leg pain after the initial lumbar decompression, related probably mostly to herniated disc. He is demented and we are trying to avoid surgery. We have been doing intermittent blocks. The one in April did seem to help but the one in July did not. The patient has requested that he go back to the anesthesiologist who gave him the block in April, and I think that is fine. He has no motive, it is just that he continues to have back and left leg pain. We will send the patient to Dr. Elliott to get the block in October of this fall and January of next year. We will get a prescription for a regular walker without wheels as per the wife's request and have him come back in 5 months.      Back Pain   The problem occurs intermittently. The problem is unchanged. The pain is present in the lumbar spine. The pain does not radiate. The pain is at a severity of 6/10. The pain is moderate. The pain is worse during the day. The symptoms are aggravated by standing and position. Stiffness is present in the morning. Associated symptoms include leg pain. Pertinent negatives include no numbness or weakness.   Leg Pain    The pain is present in the left leg. The pain is at a severity of 4/10. The pain is moderate. The pain has been intermittent since onset. Pertinent negatives include no numbness.       The following portions of the patient's history were reviewed and updated as  appropriate: allergies, current medications, past family history, past medical history, past social history, past surgical history and problem list.    Review of Systems   Musculoskeletal: Positive for back pain. Negative for arthralgias, gait problem and myalgias.   Neurological: Negative for weakness and numbness.   All other systems reviewed and are negative.      Objective   Physical Exam   Constitutional: He is oriented to person, place, and time. He appears well-developed and well-nourished.   HENT:   Head: Normocephalic and atraumatic.   Eyes: Conjunctivae and EOM are normal. Pupils are equal, round, and reactive to light.   Fundoscopic exam:       The right eye shows no papilledema. The right eye shows venous pulsations.        The left eye shows no papilledema. The left eye shows venous pulsations.   Neck: Carotid bruit is not present.   Neurological: He is oriented to person, place, and time. He has a normal Finger-Nose-Finger Test and a normal Heel to Shin Test. Gait normal.   Reflex Scores:       Tricep reflexes are 2+ on the right side and 2+ on the left side.       Bicep reflexes are 2+ on the right side and 2+ on the left side.       Brachioradialis reflexes are 2+ on the right side and 2+ on the left side.       Patellar reflexes are 2+ on the right side and 2+ on the left side.       Achilles reflexes are 2+ on the right side and 2+ on the left side.  Psychiatric: His speech is normal.     Neurologic Exam     Mental Status   Oriented to person, place, and time.   Registration of memory: Good recent and remote memory.   Attention: normal. Concentration: normal.   Speech: speech is normal   Level of consciousness: alert  Knowledge: consistent with education.     Cranial Nerves     CN II   Visual fields full to confrontation.   Visual acuity: normal    CN III, IV, VI   Pupils are equal, round, and reactive to light.  Extraocular motions are normal.     CN V   Facial sensation intact.   Right corneal  reflex: normal  Left corneal reflex: normal    CN VII   Facial expression full, symmetric.   Right facial weakness: none  Left facial weakness: none    CN VIII   Hearing: intact    CN IX, X   Palate: symmetric    CN XI   Right sternocleidomastoid strength: normal  Left sternocleidomastoid strength: normal    CN XII   Tongue: not atrophic  Tongue deviation: none    Motor Exam   Muscle bulk: normal  Right arm tone: normal  Left arm tone: normal  Right leg tone: normal  Left leg tone: normal    Strength   Strength 5/5 except as noted.     Sensory Exam   Light touch normal.     Gait, Coordination, and Reflexes     Gait  Gait: normal    Coordination   Finger to nose coordination: normal  Heel to shin coordination: normal    Reflexes   Right brachioradialis: 2+  Left brachioradialis: 2+  Right biceps: 2+  Left biceps: 2+  Right triceps: 2+  Left triceps: 2+  Right patellar: 2+  Left patellar: 2+  Right achilles: 2+  Left achilles: 2+  Right : 2+  Left : 2+      Assessment/Plan   Independent Review of Radiographic Studies:    The MRI done in December 2018 showed some recurrence of nerve compression on the left at L3-L4 due to herniated disc.  Agree with the report.      Medical Decision Making:    We will stick with blocks for now going back to the previous anesthesiologist.  A block will be ordered for October then January next year with a follow-up in February, 5 months from now.       Anthony was seen today for back pain and leg pain.    Diagnoses and all orders for this visit:    Spinal stenosis of lumbar region with neurogenic claudication  -     Epidural Block    DDD (degenerative disc disease), lumbar  -     Epidural Block      Return in about 5 months (around 2/16/2020).

## 2019-09-16 ENCOUNTER — OFFICE VISIT (OUTPATIENT)
Dept: NEUROSURGERY | Facility: CLINIC | Age: 79
End: 2019-09-16

## 2019-09-16 VITALS
HEIGHT: 72 IN | SYSTOLIC BLOOD PRESSURE: 100 MMHG | BODY MASS INDEX: 22.78 KG/M2 | HEART RATE: 74 BPM | DIASTOLIC BLOOD PRESSURE: 64 MMHG

## 2019-09-16 DIAGNOSIS — M48.062 SPINAL STENOSIS OF LUMBAR REGION WITH NEUROGENIC CLAUDICATION: Primary | ICD-10-CM

## 2019-09-16 DIAGNOSIS — M51.36 DDD (DEGENERATIVE DISC DISEASE), LUMBAR: ICD-10-CM

## 2019-09-16 PROCEDURE — 99214 OFFICE O/P EST MOD 30 MIN: CPT | Performed by: NEUROLOGICAL SURGERY

## 2019-09-17 DIAGNOSIS — R42 VERTIGO: ICD-10-CM

## 2019-09-18 RX ORDER — MECLIZINE HCL 12.5 MG/1
TABLET ORAL
Qty: 45 TABLET | Refills: 1 | Status: ON HOLD | OUTPATIENT
Start: 2019-09-18 | End: 2021-01-01

## 2019-09-22 ENCOUNTER — APPOINTMENT (OUTPATIENT)
Dept: GENERAL RADIOLOGY | Facility: HOSPITAL | Age: 79
End: 2019-09-22

## 2019-09-22 ENCOUNTER — APPOINTMENT (OUTPATIENT)
Dept: CT IMAGING | Facility: HOSPITAL | Age: 79
End: 2019-09-22

## 2019-09-22 ENCOUNTER — HOSPITAL ENCOUNTER (INPATIENT)
Facility: HOSPITAL | Age: 79
LOS: 3 days | Discharge: NURSING, REHAB OR LONG-TERM CARE | End: 2019-09-25
Attending: EMERGENCY MEDICINE | Admitting: INTERNAL MEDICINE

## 2019-09-22 DIAGNOSIS — S27.0XXA CLOSED TRAUMATIC FRACTURE OF RIBS OF RIGHT SIDE WITH PNEUMOTHORAX, INITIAL ENCOUNTER: Primary | ICD-10-CM

## 2019-09-22 DIAGNOSIS — S22.41XA CLOSED TRAUMATIC FRACTURE OF RIBS OF RIGHT SIDE WITH PNEUMOTHORAX, INITIAL ENCOUNTER: Primary | ICD-10-CM

## 2019-09-22 DIAGNOSIS — S32.010A COMPRESSION FRACTURE OF L1 LUMBAR VERTEBRA, CLOSED, INITIAL ENCOUNTER (HCC): ICD-10-CM
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
## 2019-09-22 PROBLEM — M48.50XA VERTEBRAL COMPRESSION FRACTURE (HCC): Status: ACTIVE | Noted: 2019-09-22

## 2019-09-22 PROBLEM — J96.01 ACUTE RESPIRATORY FAILURE WITH HYPOXIA (HCC): Status: ACTIVE | Noted: 2019-09-22

## 2019-09-22 PROBLEM — S22.49XA TRAUMATIC FRACTURE OF RIBS WITH PNEUMOTHORAX: Status: ACTIVE | Noted: 2019-09-22

## 2019-09-22 LAB
ALBUMIN SERPL-MCNC: 4.1 G/DL (ref 3.5–5.2)
ALBUMIN/GLOB SERPL: 1.6 G/DL
ALP SERPL-CCNC: 145 U/L (ref 39–117)
ALT SERPL W P-5'-P-CCNC: 12 U/L (ref 1–41)
ANION GAP SERPL CALCULATED.3IONS-SCNC: 13 MMOL/L (ref 5–15)
AST SERPL-CCNC: 16 U/L (ref 1–40)
BASOPHILS # BLD AUTO: 0.08 10*3/MM3 (ref 0–0.2)
BASOPHILS NFR BLD AUTO: 0.6 % (ref 0–1.5)
BILIRUB SERPL-MCNC: 0.5 MG/DL (ref 0.2–1.2)
BUN BLD-MCNC: 19 MG/DL (ref 8–23)
BUN/CREAT SERPL: 16.7 (ref 7–25)
CALCIUM SPEC-SCNC: 9.5 MG/DL (ref 8.6–10.5)
CHLORIDE SERPL-SCNC: 102 MMOL/L (ref 98–107)
CO2 SERPL-SCNC: 27 MMOL/L (ref 22–29)
CREAT BLD-MCNC: 1.14 MG/DL (ref 0.76–1.27)
DEPRECATED RDW RBC AUTO: 44.9 FL (ref 37–54)
EOSINOPHIL # BLD AUTO: 0.05 10*3/MM3 (ref 0–0.4)
EOSINOPHIL NFR BLD AUTO: 0.4 % (ref 0.3–6.2)
ERYTHROCYTE [DISTWIDTH] IN BLOOD BY AUTOMATED COUNT: 13 % (ref 12.3–15.4)
GFR SERPL CREATININE-BSD FRML MDRD: 62 ML/MIN/1.73
GLOBULIN UR ELPH-MCNC: 2.6 GM/DL
GLUCOSE BLD-MCNC: 136 MG/DL (ref 65–99)
GLUCOSE BLDC GLUCOMTR-MCNC: 122 MG/DL (ref 70–130)
GLUCOSE BLDC GLUCOMTR-MCNC: 170 MG/DL (ref 70–130)
GLUCOSE BLDC GLUCOMTR-MCNC: 172 MG/DL (ref 70–130)
HCT VFR BLD AUTO: 41.7 % (ref 37.5–51)
HGB BLD-MCNC: 13.6 G/DL (ref 13–17.7)
IMM GRANULOCYTES # BLD AUTO: 0.12 10*3/MM3 (ref 0–0.05)
IMM GRANULOCYTES NFR BLD AUTO: 0.9 % (ref 0–0.5)
LYMPHOCYTES # BLD AUTO: 0.79 10*3/MM3 (ref 0.7–3.1)
LYMPHOCYTES NFR BLD AUTO: 6.1 % (ref 19.6–45.3)
MCH RBC QN AUTO: 30.8 PG (ref 26.6–33)
MCHC RBC AUTO-ENTMCNC: 32.6 G/DL (ref 31.5–35.7)
MCV RBC AUTO: 94.6 FL (ref 79–97)
MONOCYTES # BLD AUTO: 0.81 10*3/MM3 (ref 0.1–0.9)
MONOCYTES NFR BLD AUTO: 6.2 % (ref 5–12)
NEUTROPHILS # BLD AUTO: 11.13 10*3/MM3 (ref 1.7–7)
NEUTROPHILS NFR BLD AUTO: 85.8 % (ref 42.7–76)
NRBC BLD AUTO-RTO: 0 /100 WBC (ref 0–0.2)
PLATELET # BLD AUTO: 199 10*3/MM3 (ref 140–450)
PMV BLD AUTO: 10.6 FL (ref 6–12)
POTASSIUM BLD-SCNC: 4.3 MMOL/L (ref 3.5–5.2)
PROT SERPL-MCNC: 6.7 G/DL (ref 6–8.5)
RBC # BLD AUTO: 4.41 10*6/MM3 (ref 4.14–5.8)
SODIUM BLD-SCNC: 142 MMOL/L (ref 136–145)
WBC NRBC COR # BLD: 12.98 10*3/MM3 (ref 3.4–10.8)

## 2019-09-22 PROCEDURE — 85025 COMPLETE CBC W/AUTO DIFF WBC: CPT | Performed by: NURSE PRACTITIONER

## 2019-09-22 PROCEDURE — 94799 UNLISTED PULMONARY SVC/PX: CPT

## 2019-09-22 PROCEDURE — 0W9930Z DRAINAGE OF RIGHT PLEURAL CAVITY WITH DRAINAGE DEVICE, PERCUTANEOUS APPROACH: ICD-10-PCS | Performed by: INTERNAL MEDICINE

## 2019-09-22 PROCEDURE — 71045 X-RAY EXAM CHEST 1 VIEW: CPT

## 2019-09-22 PROCEDURE — 99285 EMERGENCY DEPT VISIT HI MDM: CPT

## 2019-09-22 PROCEDURE — 80053 COMPREHEN METABOLIC PANEL: CPT | Performed by: NURSE PRACTITIONER

## 2019-09-22 PROCEDURE — 82962 GLUCOSE BLOOD TEST: CPT

## 2019-09-22 PROCEDURE — 72110 X-RAY EXAM L-2 SPINE 4/>VWS: CPT

## 2019-09-22 PROCEDURE — 99221 1ST HOSP IP/OBS SF/LOW 40: CPT | Performed by: THORACIC SURGERY (CARDIOTHORACIC VASCULAR SURGERY)

## 2019-09-22 PROCEDURE — 72125 CT NECK SPINE W/O DYE: CPT

## 2019-09-22 PROCEDURE — 71101 X-RAY EXAM UNILAT RIBS/CHEST: CPT

## 2019-09-22 PROCEDURE — 70450 CT HEAD/BRAIN W/O DYE: CPT

## 2019-09-22 RX ORDER — ONDANSETRON 2 MG/ML
4 INJECTION INTRAMUSCULAR; INTRAVENOUS EVERY 6 HOURS PRN
Status: DISCONTINUED | OUTPATIENT
Start: 2019-09-22 | End: 2019-09-25 | Stop reason: HOSPADM

## 2019-09-22 RX ORDER — ACETAMINOPHEN 500 MG
1000 TABLET ORAL 3 TIMES DAILY
Status: DISCONTINUED | OUTPATIENT
Start: 2019-09-22 | End: 2019-09-25 | Stop reason: HOSPADM

## 2019-09-22 RX ORDER — SODIUM CHLORIDE 0.9 % (FLUSH) 0.9 %
10 SYRINGE (ML) INJECTION AS NEEDED
Status: DISCONTINUED | OUTPATIENT
Start: 2019-09-22 | End: 2019-09-25 | Stop reason: HOSPADM

## 2019-09-22 RX ORDER — ACETAMINOPHEN 160 MG/5ML
650 SOLUTION ORAL EVERY 4 HOURS PRN
Status: DISCONTINUED | OUTPATIENT
Start: 2019-09-22 | End: 2019-09-22

## 2019-09-22 RX ORDER — LEVOTHYROXINE SODIUM 0.07 MG/1
75 TABLET ORAL
Status: DISCONTINUED | OUTPATIENT
Start: 2019-09-23 | End: 2019-09-25 | Stop reason: HOSPADM

## 2019-09-22 RX ORDER — LIDOCAINE HYDROCHLORIDE AND EPINEPHRINE 10; 10 MG/ML; UG/ML
10 INJECTION, SOLUTION INFILTRATION; PERINEURAL ONCE
Status: COMPLETED | OUTPATIENT
Start: 2019-09-22 | End: 2019-09-22

## 2019-09-22 RX ORDER — ACETAMINOPHEN 325 MG/1
650 TABLET ORAL EVERY 4 HOURS PRN
Status: DISCONTINUED | OUTPATIENT
Start: 2019-09-22 | End: 2019-09-22

## 2019-09-22 RX ORDER — FERROUS SULFATE 325(65) MG
325 TABLET ORAL
Status: DISCONTINUED | OUTPATIENT
Start: 2019-09-22 | End: 2019-09-23

## 2019-09-22 RX ORDER — DEXTROSE MONOHYDRATE 25 G/50ML
25 INJECTION, SOLUTION INTRAVENOUS
Status: DISCONTINUED | OUTPATIENT
Start: 2019-09-22 | End: 2019-09-25 | Stop reason: HOSPADM

## 2019-09-22 RX ORDER — ATORVASTATIN CALCIUM 80 MG/1
80 TABLET, FILM COATED ORAL DAILY
Status: DISCONTINUED | OUTPATIENT
Start: 2019-09-22 | End: 2019-09-25 | Stop reason: HOSPADM

## 2019-09-22 RX ORDER — ACETAMINOPHEN 650 MG/1
650 SUPPOSITORY RECTAL EVERY 4 HOURS PRN
Status: DISCONTINUED | OUTPATIENT
Start: 2019-09-22 | End: 2019-09-22

## 2019-09-22 RX ORDER — TRAMADOL HYDROCHLORIDE 50 MG/1
50 TABLET ORAL EVERY 6 HOURS PRN
Status: DISCONTINUED | OUTPATIENT
Start: 2019-09-22 | End: 2019-09-23

## 2019-09-22 RX ORDER — HYDROCODONE BITARTRATE AND ACETAMINOPHEN 7.5; 325 MG/1; MG/1
2 TABLET ORAL EVERY 4 HOURS PRN
Status: DISCONTINUED | OUTPATIENT
Start: 2019-09-22 | End: 2019-09-22

## 2019-09-22 RX ORDER — NICOTINE POLACRILEX 4 MG
15 LOZENGE BUCCAL
Status: DISCONTINUED | OUTPATIENT
Start: 2019-09-22 | End: 2019-09-25 | Stop reason: HOSPADM

## 2019-09-22 RX ORDER — TRAMADOL HYDROCHLORIDE 50 MG/1
50 TABLET ORAL EVERY 8 HOURS SCHEDULED
Status: DISCONTINUED | OUTPATIENT
Start: 2019-09-22 | End: 2019-09-25 | Stop reason: HOSPADM

## 2019-09-22 RX ORDER — ACETAMINOPHEN 325 MG/1
650 TABLET ORAL 3 TIMES DAILY
Status: DISCONTINUED | OUTPATIENT
Start: 2019-09-22 | End: 2019-09-22

## 2019-09-22 RX ORDER — MEMANTINE HYDROCHLORIDE 5 MG/1
5 TABLET ORAL 2 TIMES DAILY
Status: DISCONTINUED | OUTPATIENT
Start: 2019-09-22 | End: 2019-09-25 | Stop reason: HOSPADM

## 2019-09-22 RX ORDER — HYDROCODONE BITARTRATE AND ACETAMINOPHEN 7.5; 325 MG/1; MG/1
1 TABLET ORAL EVERY 4 HOURS PRN
Status: DISCONTINUED | OUTPATIENT
Start: 2019-09-22 | End: 2019-09-22

## 2019-09-22 RX ORDER — SODIUM CHLORIDE 0.9 % (FLUSH) 0.9 %
10 SYRINGE (ML) INJECTION EVERY 12 HOURS SCHEDULED
Status: DISCONTINUED | OUTPATIENT
Start: 2019-09-22 | End: 2019-09-25 | Stop reason: HOSPADM

## 2019-09-22 RX ORDER — GABAPENTIN 300 MG/1
300 CAPSULE ORAL 3 TIMES DAILY
Status: DISCONTINUED | OUTPATIENT
Start: 2019-09-22 | End: 2019-09-23

## 2019-09-22 RX ADMIN — SODIUM CHLORIDE, PRESERVATIVE FREE 10 ML: 5 INJECTION INTRAVENOUS at 20:49

## 2019-09-22 RX ADMIN — MEMANTINE HYDROCHLORIDE 5 MG: 5 TABLET, FILM COATED ORAL at 20:48

## 2019-09-22 RX ADMIN — ATORVASTATIN CALCIUM 80 MG: 80 TABLET, FILM COATED ORAL at 17:34

## 2019-09-22 RX ADMIN — GABAPENTIN 300 MG: 300 CAPSULE ORAL at 17:38

## 2019-09-22 RX ADMIN — FERROUS SULFATE TAB 325 MG (65 MG ELEMENTAL FE) 325 MG: 325 (65 FE) TAB at 17:35

## 2019-09-22 RX ADMIN — LINAGLIPTIN 5 MG: 5 TABLET, FILM COATED ORAL at 17:34

## 2019-09-22 RX ADMIN — LIDOCAINE HYDROCHLORIDE AND EPINEPHRINE 10 ML: 10; 10 INJECTION, SOLUTION INFILTRATION; PERINEURAL at 10:34

## 2019-09-22 RX ADMIN — TRAMADOL HYDROCHLORIDE 50 MG: 50 TABLET, FILM COATED ORAL at 17:35

## 2019-09-22 RX ADMIN — HYDROCODONE BITARTRATE AND ACETAMINOPHEN 1 TABLET: 7.5; 325 TABLET ORAL at 13:41

## 2019-09-23 ENCOUNTER — APPOINTMENT (OUTPATIENT)
Dept: GENERAL RADIOLOGY | Facility: HOSPITAL | Age: 79
End: 2019-09-23

## 2019-09-23 LAB
ANION GAP SERPL CALCULATED.3IONS-SCNC: 14.2 MMOL/L (ref 5–15)
BUN BLD-MCNC: 24 MG/DL (ref 8–23)
BUN/CREAT SERPL: 22 (ref 7–25)
CALCIUM SPEC-SCNC: 8.6 MG/DL (ref 8.6–10.5)
CHLORIDE SERPL-SCNC: 101 MMOL/L (ref 98–107)
CO2 SERPL-SCNC: 22.8 MMOL/L (ref 22–29)
CREAT BLD-MCNC: 1.09 MG/DL (ref 0.76–1.27)
DEPRECATED RDW RBC AUTO: 44.2 FL (ref 37–54)
ERYTHROCYTE [DISTWIDTH] IN BLOOD BY AUTOMATED COUNT: 12.9 % (ref 12.3–15.4)
GFR SERPL CREATININE-BSD FRML MDRD: 65 ML/MIN/1.73
GLUCOSE BLD-MCNC: 150 MG/DL (ref 65–99)
GLUCOSE BLDC GLUCOMTR-MCNC: 129 MG/DL (ref 70–130)
GLUCOSE BLDC GLUCOMTR-MCNC: 137 MG/DL (ref 70–130)
GLUCOSE BLDC GLUCOMTR-MCNC: 148 MG/DL (ref 70–130)
GLUCOSE BLDC GLUCOMTR-MCNC: 160 MG/DL (ref 70–130)
HCT VFR BLD AUTO: 35.3 % (ref 37.5–51)
HGB BLD-MCNC: 11.5 G/DL (ref 13–17.7)
MCH RBC QN AUTO: 30.1 PG (ref 26.6–33)
MCHC RBC AUTO-ENTMCNC: 32.6 G/DL (ref 31.5–35.7)
MCV RBC AUTO: 92.4 FL (ref 79–97)
PLATELET # BLD AUTO: 176 10*3/MM3 (ref 140–450)
PMV BLD AUTO: 10.5 FL (ref 6–12)
POTASSIUM BLD-SCNC: 4 MMOL/L (ref 3.5–5.2)
RBC # BLD AUTO: 3.82 10*6/MM3 (ref 4.14–5.8)
SODIUM BLD-SCNC: 138 MMOL/L (ref 136–145)
WBC NRBC COR # BLD: 11.59 10*3/MM3 (ref 3.4–10.8)

## 2019-09-23 PROCEDURE — 80048 BASIC METABOLIC PNL TOTAL CA: CPT | Performed by: INTERNAL MEDICINE

## 2019-09-23 PROCEDURE — 82962 GLUCOSE BLOOD TEST: CPT

## 2019-09-23 PROCEDURE — 63710000001 INSULIN LISPRO (HUMAN) PER 5 UNITS: Performed by: INTERNAL MEDICINE

## 2019-09-23 PROCEDURE — 71045 X-RAY EXAM CHEST 1 VIEW: CPT

## 2019-09-23 PROCEDURE — 25010000002 HALOPERIDOL LACTATE PER 5 MG: Performed by: INTERNAL MEDICINE

## 2019-09-23 PROCEDURE — 99232 SBSQ HOSP IP/OBS MODERATE 35: CPT | Performed by: NURSE PRACTITIONER

## 2019-09-23 PROCEDURE — 85027 COMPLETE CBC AUTOMATED: CPT | Performed by: INTERNAL MEDICINE

## 2019-09-23 RX ORDER — MORPHINE SULFATE 2 MG/ML
2 INJECTION, SOLUTION INTRAMUSCULAR; INTRAVENOUS EVERY 4 HOURS PRN
Status: DISCONTINUED | OUTPATIENT
Start: 2019-09-23 | End: 2019-09-25 | Stop reason: HOSPADM

## 2019-09-23 RX ORDER — NALOXONE HCL 0.4 MG/ML
0.4 VIAL (ML) INJECTION
Status: DISCONTINUED | OUTPATIENT
Start: 2019-09-23 | End: 2019-09-25 | Stop reason: HOSPADM

## 2019-09-23 RX ORDER — GABAPENTIN 250 MG/5ML
300 SOLUTION ORAL EVERY 8 HOURS SCHEDULED
Status: DISCONTINUED | OUTPATIENT
Start: 2019-09-23 | End: 2019-09-25 | Stop reason: HOSPADM

## 2019-09-23 RX ORDER — HALOPERIDOL 5 MG/ML
1 INJECTION INTRAMUSCULAR EVERY 6 HOURS PRN
Status: DISCONTINUED | OUTPATIENT
Start: 2019-09-23 | End: 2019-09-25 | Stop reason: HOSPADM

## 2019-09-23 RX ADMIN — MEMANTINE HYDROCHLORIDE 5 MG: 5 TABLET, FILM COATED ORAL at 09:13

## 2019-09-23 RX ADMIN — ACETAMINOPHEN 1000 MG: 500 TABLET, FILM COATED ORAL at 00:43

## 2019-09-23 RX ADMIN — INSULIN LISPRO 2 UNITS: 100 INJECTION, SOLUTION INTRAVENOUS; SUBCUTANEOUS at 21:52

## 2019-09-23 RX ADMIN — GABAPENTIN 300 MG: 250 SOLUTION ORAL at 18:17

## 2019-09-23 RX ADMIN — MEMANTINE HYDROCHLORIDE 5 MG: 5 TABLET, FILM COATED ORAL at 20:43

## 2019-09-23 RX ADMIN — ACETAMINOPHEN 1000 MG: 500 TABLET, FILM COATED ORAL at 06:20

## 2019-09-23 RX ADMIN — TRAMADOL HYDROCHLORIDE 50 MG: 50 TABLET, FILM COATED ORAL at 21:52

## 2019-09-23 RX ADMIN — GABAPENTIN 300 MG: 300 CAPSULE ORAL at 09:13

## 2019-09-23 RX ADMIN — TRAMADOL HYDROCHLORIDE 50 MG: 50 TABLET ORAL at 09:40

## 2019-09-23 RX ADMIN — ACETAMINOPHEN 1000 MG: 500 TABLET, FILM COATED ORAL at 13:12

## 2019-09-23 RX ADMIN — SODIUM CHLORIDE, PRESERVATIVE FREE 10 ML: 5 INJECTION INTRAVENOUS at 20:44

## 2019-09-23 RX ADMIN — LINAGLIPTIN 5 MG: 5 TABLET, FILM COATED ORAL at 09:13

## 2019-09-23 RX ADMIN — SODIUM CHLORIDE, PRESERVATIVE FREE 10 ML: 5 INJECTION INTRAVENOUS at 09:14

## 2019-09-23 RX ADMIN — ACETAMINOPHEN 1000 MG: 500 TABLET, FILM COATED ORAL at 21:52

## 2019-09-23 RX ADMIN — LEVOTHYROXINE SODIUM 75 MCG: 75 TABLET ORAL at 06:20

## 2019-09-23 RX ADMIN — TRAMADOL HYDROCHLORIDE 50 MG: 50 TABLET, FILM COATED ORAL at 01:36

## 2019-09-23 RX ADMIN — GABAPENTIN 300 MG: 300 CAPSULE ORAL at 00:43

## 2019-09-23 RX ADMIN — ATORVASTATIN CALCIUM 80 MG: 80 TABLET, FILM COATED ORAL at 09:13

## 2019-09-23 RX ADMIN — HALOPERIDOL LACTATE 1 MG: 5 INJECTION INTRAMUSCULAR at 13:12

## 2019-09-24 ENCOUNTER — APPOINTMENT (OUTPATIENT)
Dept: GENERAL RADIOLOGY | Facility: HOSPITAL | Age: 79
End: 2019-09-24

## 2019-09-24 LAB
ANION GAP SERPL CALCULATED.3IONS-SCNC: 14.3 MMOL/L (ref 5–15)
BASOPHILS # BLD AUTO: 0.06 10*3/MM3 (ref 0–0.2)
BASOPHILS NFR BLD AUTO: 0.5 % (ref 0–1.5)
BUN BLD-MCNC: 19 MG/DL (ref 8–23)
BUN/CREAT SERPL: 19.4 (ref 7–25)
CALCIUM SPEC-SCNC: 8.8 MG/DL (ref 8.6–10.5)
CHLORIDE SERPL-SCNC: 98 MMOL/L (ref 98–107)
CO2 SERPL-SCNC: 24.7 MMOL/L (ref 22–29)
CREAT BLD-MCNC: 0.98 MG/DL (ref 0.76–1.27)
DEPRECATED RDW RBC AUTO: 43.9 FL (ref 37–54)
EOSINOPHIL # BLD AUTO: 0.08 10*3/MM3 (ref 0–0.4)
EOSINOPHIL NFR BLD AUTO: 0.6 % (ref 0.3–6.2)
ERYTHROCYTE [DISTWIDTH] IN BLOOD BY AUTOMATED COUNT: 12.7 % (ref 12.3–15.4)
GFR SERPL CREATININE-BSD FRML MDRD: 74 ML/MIN/1.73
GLUCOSE BLD-MCNC: 117 MG/DL (ref 65–99)
GLUCOSE BLDC GLUCOMTR-MCNC: 114 MG/DL (ref 70–130)
GLUCOSE BLDC GLUCOMTR-MCNC: 130 MG/DL (ref 70–130)
GLUCOSE BLDC GLUCOMTR-MCNC: 155 MG/DL (ref 70–130)
GLUCOSE BLDC GLUCOMTR-MCNC: 159 MG/DL (ref 70–130)
HCT VFR BLD AUTO: 37.2 % (ref 37.5–51)
HGB BLD-MCNC: 12 G/DL (ref 13–17.7)
IMM GRANULOCYTES # BLD AUTO: 0.07 10*3/MM3 (ref 0–0.05)
IMM GRANULOCYTES NFR BLD AUTO: 0.6 % (ref 0–0.5)
LYMPHOCYTES # BLD AUTO: 1.1 10*3/MM3 (ref 0.7–3.1)
LYMPHOCYTES NFR BLD AUTO: 8.7 % (ref 19.6–45.3)
MCH RBC QN AUTO: 30.1 PG (ref 26.6–33)
MCHC RBC AUTO-ENTMCNC: 32.3 G/DL (ref 31.5–35.7)
MCV RBC AUTO: 93.2 FL (ref 79–97)
MONOCYTES # BLD AUTO: 1.07 10*3/MM3 (ref 0.1–0.9)
MONOCYTES NFR BLD AUTO: 8.5 % (ref 5–12)
NEUTROPHILS # BLD AUTO: 10.27 10*3/MM3 (ref 1.7–7)
NEUTROPHILS NFR BLD AUTO: 81.1 % (ref 42.7–76)
NRBC BLD AUTO-RTO: 0 /100 WBC (ref 0–0.2)
PLATELET # BLD AUTO: 176 10*3/MM3 (ref 140–450)
PMV BLD AUTO: 10.6 FL (ref 6–12)
POTASSIUM BLD-SCNC: 4 MMOL/L (ref 3.5–5.2)
RBC # BLD AUTO: 3.99 10*6/MM3 (ref 4.14–5.8)
SODIUM BLD-SCNC: 137 MMOL/L (ref 136–145)
WBC NRBC COR # BLD: 12.65 10*3/MM3 (ref 3.4–10.8)

## 2019-09-24 PROCEDURE — 97110 THERAPEUTIC EXERCISES: CPT

## 2019-09-24 PROCEDURE — 25010000002 MORPHINE PER 10 MG: Performed by: INTERNAL MEDICINE

## 2019-09-24 PROCEDURE — 71045 X-RAY EXAM CHEST 1 VIEW: CPT

## 2019-09-24 PROCEDURE — 99232 SBSQ HOSP IP/OBS MODERATE 35: CPT | Performed by: NURSE PRACTITIONER

## 2019-09-24 PROCEDURE — 82962 GLUCOSE BLOOD TEST: CPT

## 2019-09-24 PROCEDURE — 25010000002 HALOPERIDOL LACTATE PER 5 MG: Performed by: INTERNAL MEDICINE

## 2019-09-24 PROCEDURE — 85025 COMPLETE CBC W/AUTO DIFF WBC: CPT | Performed by: NURSE PRACTITIONER

## 2019-09-24 PROCEDURE — 97162 PT EVAL MOD COMPLEX 30 MIN: CPT

## 2019-09-24 PROCEDURE — 80048 BASIC METABOLIC PNL TOTAL CA: CPT | Performed by: NURSE PRACTITIONER

## 2019-09-24 RX ORDER — SODIUM CHLORIDE, SODIUM LACTATE, POTASSIUM CHLORIDE, CALCIUM CHLORIDE 600; 310; 30; 20 MG/100ML; MG/100ML; MG/100ML; MG/100ML
75 INJECTION, SOLUTION INTRAVENOUS CONTINUOUS
Status: DISCONTINUED | OUTPATIENT
Start: 2019-09-24 | End: 2019-09-25 | Stop reason: HOSPADM

## 2019-09-24 RX ADMIN — MORPHINE SULFATE 2 MG: 2 INJECTION, SOLUTION INTRAMUSCULAR; INTRAVENOUS at 08:30

## 2019-09-24 RX ADMIN — SODIUM CHLORIDE, POTASSIUM CHLORIDE, SODIUM LACTATE AND CALCIUM CHLORIDE 75 ML/HR: 600; 310; 30; 20 INJECTION, SOLUTION INTRAVENOUS at 15:28

## 2019-09-24 RX ADMIN — MEMANTINE HYDROCHLORIDE 5 MG: 5 TABLET, FILM COATED ORAL at 21:06

## 2019-09-24 RX ADMIN — ACETAMINOPHEN 1000 MG: 500 TABLET, FILM COATED ORAL at 06:00

## 2019-09-24 RX ADMIN — HALOPERIDOL LACTATE 1 MG: 5 INJECTION INTRAMUSCULAR at 08:13

## 2019-09-24 RX ADMIN — ACETAMINOPHEN 1000 MG: 500 TABLET, FILM COATED ORAL at 21:06

## 2019-09-24 RX ADMIN — GABAPENTIN 300 MG: 250 SOLUTION ORAL at 06:00

## 2019-09-24 RX ADMIN — TRAMADOL HYDROCHLORIDE 50 MG: 50 TABLET, FILM COATED ORAL at 21:06

## 2019-09-24 RX ADMIN — TRAMADOL HYDROCHLORIDE 50 MG: 50 TABLET, FILM COATED ORAL at 06:00

## 2019-09-24 RX ADMIN — GABAPENTIN 300 MG: 250 SOLUTION ORAL at 15:28

## 2019-09-24 RX ADMIN — TRAMADOL HYDROCHLORIDE 50 MG: 50 TABLET, FILM COATED ORAL at 15:29

## 2019-09-24 RX ADMIN — SODIUM CHLORIDE, PRESERVATIVE FREE 10 ML: 5 INJECTION INTRAVENOUS at 10:52

## 2019-09-24 RX ADMIN — ACETAMINOPHEN 1000 MG: 500 TABLET, FILM COATED ORAL at 15:29

## 2019-09-24 RX ADMIN — LEVOTHYROXINE SODIUM 75 MCG: 75 TABLET ORAL at 06:00

## 2019-09-24 RX ADMIN — GABAPENTIN 300 MG: 250 SOLUTION ORAL at 21:06

## 2019-09-25 ENCOUNTER — APPOINTMENT (OUTPATIENT)
Dept: GENERAL RADIOLOGY | Facility: HOSPITAL | Age: 79
End: 2019-09-25

## 2019-09-25 VITALS
HEIGHT: 73 IN | OXYGEN SATURATION: 93 % | BODY MASS INDEX: 22.26 KG/M2 | RESPIRATION RATE: 16 BRPM | DIASTOLIC BLOOD PRESSURE: 84 MMHG | HEART RATE: 70 BPM | SYSTOLIC BLOOD PRESSURE: 147 MMHG | WEIGHT: 168 LBS | TEMPERATURE: 97.8 F

## 2019-09-25 LAB
ANION GAP SERPL CALCULATED.3IONS-SCNC: 10.7 MMOL/L (ref 5–15)
BASOPHILS # BLD AUTO: 0.06 10*3/MM3 (ref 0–0.2)
BASOPHILS NFR BLD AUTO: 0.7 % (ref 0–1.5)
BUN BLD-MCNC: 22 MG/DL (ref 8–23)
BUN/CREAT SERPL: 28.2 (ref 7–25)
CALCIUM SPEC-SCNC: 8.7 MG/DL (ref 8.6–10.5)
CHLORIDE SERPL-SCNC: 105 MMOL/L (ref 98–107)
CO2 SERPL-SCNC: 26.3 MMOL/L (ref 22–29)
CREAT BLD-MCNC: 0.78 MG/DL (ref 0.76–1.27)
DEPRECATED RDW RBC AUTO: 43 FL (ref 37–54)
EOSINOPHIL # BLD AUTO: 0.21 10*3/MM3 (ref 0–0.4)
EOSINOPHIL NFR BLD AUTO: 2.4 % (ref 0.3–6.2)
ERYTHROCYTE [DISTWIDTH] IN BLOOD BY AUTOMATED COUNT: 12.5 % (ref 12.3–15.4)
GFR SERPL CREATININE-BSD FRML MDRD: 96 ML/MIN/1.73
GLUCOSE BLD-MCNC: 100 MG/DL (ref 65–99)
GLUCOSE BLDC GLUCOMTR-MCNC: 151 MG/DL (ref 70–130)
GLUCOSE BLDC GLUCOMTR-MCNC: 96 MG/DL (ref 70–130)
HCT VFR BLD AUTO: 33.7 % (ref 37.5–51)
HGB BLD-MCNC: 10.9 G/DL (ref 13–17.7)
IMM GRANULOCYTES # BLD AUTO: 0.08 10*3/MM3 (ref 0–0.05)
IMM GRANULOCYTES NFR BLD AUTO: 0.9 % (ref 0–0.5)
LYMPHOCYTES # BLD AUTO: 1.56 10*3/MM3 (ref 0.7–3.1)
LYMPHOCYTES NFR BLD AUTO: 17.7 % (ref 19.6–45.3)
MCH RBC QN AUTO: 30.4 PG (ref 26.6–33)
MCHC RBC AUTO-ENTMCNC: 32.3 G/DL (ref 31.5–35.7)
MCV RBC AUTO: 94.1 FL (ref 79–97)
MONOCYTES # BLD AUTO: 0.87 10*3/MM3 (ref 0.1–0.9)
MONOCYTES NFR BLD AUTO: 9.9 % (ref 5–12)
NEUTROPHILS # BLD AUTO: 6.03 10*3/MM3 (ref 1.7–7)
NEUTROPHILS NFR BLD AUTO: 68.4 % (ref 42.7–76)
NRBC BLD AUTO-RTO: 0 /100 WBC (ref 0–0.2)
PLATELET # BLD AUTO: 152 10*3/MM3 (ref 140–450)
PMV BLD AUTO: 10.3 FL (ref 6–12)
POTASSIUM BLD-SCNC: 3.8 MMOL/L (ref 3.5–5.2)
RBC # BLD AUTO: 3.58 10*6/MM3 (ref 4.14–5.8)
SODIUM BLD-SCNC: 142 MMOL/L (ref 136–145)
WBC NRBC COR # BLD: 8.81 10*3/MM3 (ref 3.4–10.8)

## 2019-09-25 PROCEDURE — 97165 OT EVAL LOW COMPLEX 30 MIN: CPT | Performed by: OCCUPATIONAL THERAPIST

## 2019-09-25 PROCEDURE — 85025 COMPLETE CBC W/AUTO DIFF WBC: CPT | Performed by: NURSE PRACTITIONER

## 2019-09-25 PROCEDURE — 97110 THERAPEUTIC EXERCISES: CPT | Performed by: OCCUPATIONAL THERAPIST

## 2019-09-25 PROCEDURE — 80048 BASIC METABOLIC PNL TOTAL CA: CPT | Performed by: NURSE PRACTITIONER

## 2019-09-25 PROCEDURE — 73030 X-RAY EXAM OF SHOULDER: CPT

## 2019-09-25 PROCEDURE — 71045 X-RAY EXAM CHEST 1 VIEW: CPT

## 2019-09-25 PROCEDURE — 82962 GLUCOSE BLOOD TEST: CPT

## 2019-09-25 PROCEDURE — 99232 SBSQ HOSP IP/OBS MODERATE 35: CPT | Performed by: NURSE PRACTITIONER

## 2019-09-25 RX ORDER — TRAMADOL HYDROCHLORIDE 50 MG/1
50 TABLET ORAL EVERY 8 HOURS PRN
Qty: 30 TABLET | Refills: 2 | Status: SHIPPED | OUTPATIENT
Start: 2019-09-25 | End: 2019-09-25 | Stop reason: SDUPTHER

## 2019-09-25 RX ORDER — GABAPENTIN 300 MG/1
300 CAPSULE ORAL 3 TIMES DAILY
Qty: 9 CAPSULE | Refills: 0 | Status: ON HOLD | OUTPATIENT
Start: 2019-09-25 | End: 2021-01-01

## 2019-09-25 RX ORDER — TRAMADOL HYDROCHLORIDE 50 MG/1
50 TABLET ORAL EVERY 8 HOURS PRN
Qty: 9 TABLET | Refills: 0 | Status: ON HOLD | OUTPATIENT
Start: 2019-09-25 | End: 2021-01-01

## 2019-09-25 RX ADMIN — LEVOTHYROXINE SODIUM 75 MCG: 75 TABLET ORAL at 06:16

## 2019-09-25 RX ADMIN — TRAMADOL HYDROCHLORIDE 50 MG: 50 TABLET, FILM COATED ORAL at 06:15

## 2019-09-25 RX ADMIN — SODIUM CHLORIDE, POTASSIUM CHLORIDE, SODIUM LACTATE AND CALCIUM CHLORIDE 75 ML/HR: 600; 310; 30; 20 INJECTION, SOLUTION INTRAVENOUS at 08:49

## 2019-09-25 RX ADMIN — GABAPENTIN 300 MG: 250 SOLUTION ORAL at 14:15

## 2019-09-25 RX ADMIN — ACETAMINOPHEN 1000 MG: 500 TABLET, FILM COATED ORAL at 14:15

## 2019-09-25 RX ADMIN — MEMANTINE HYDROCHLORIDE 5 MG: 5 TABLET, FILM COATED ORAL at 08:48

## 2019-09-25 RX ADMIN — LINAGLIPTIN 5 MG: 5 TABLET, FILM COATED ORAL at 08:48

## 2019-09-25 RX ADMIN — ATORVASTATIN CALCIUM 80 MG: 80 TABLET, FILM COATED ORAL at 08:48

## 2019-09-25 RX ADMIN — ACETAMINOPHEN 1000 MG: 500 TABLET, FILM COATED ORAL at 06:16

## 2019-09-25 RX ADMIN — TRAMADOL HYDROCHLORIDE 50 MG: 50 TABLET, FILM COATED ORAL at 14:15

## 2019-09-25 RX ADMIN — GABAPENTIN 300 MG: 250 SOLUTION ORAL at 06:15

## 2019-09-26 ENCOUNTER — EPISODE CHANGES (OUTPATIENT)
Dept: CASE MANAGEMENT | Facility: OTHER | Age: 79
End: 2019-09-26

## 2019-10-01 ENCOUNTER — APPOINTMENT (OUTPATIENT)
Dept: PAIN MEDICINE | Facility: HOSPITAL | Age: 79
End: 2019-10-01

## 2019-10-01 ENCOUNTER — EPISODE CHANGES (OUTPATIENT)
Dept: CASE MANAGEMENT | Facility: OTHER | Age: 79
End: 2019-10-01

## 2019-10-10 ENCOUNTER — EPISODE CHANGES (OUTPATIENT)
Dept: CASE MANAGEMENT | Facility: OTHER | Age: 79
End: 2019-10-10

## 2019-10-10 ENCOUNTER — PATIENT OUTREACH (OUTPATIENT)
Dept: CASE MANAGEMENT | Facility: OTHER | Age: 79
End: 2019-10-10

## 2019-10-10 NOTE — OUTREACH NOTE
Care Coordination Note  [phone] She states patient admitted on 9/28/19 to [facility] Care Unit receiving SN, PT, OT services. No discharge date at this time.     Leia Mayberry RN  Community Care Coordinator    10/10/2019, 9:32 AM

## 2019-10-10 NOTE — OUTREACH NOTE
Care Coordination Note  Talked with Staff at [facility] who states patient was discharged to [facility] on 9/28/19.     Leia Mayberry RN  Community Care Coordinator    10/10/2019, 9:31 AM

## 2019-10-14 ENCOUNTER — EPISODE CHANGES (OUTPATIENT)
Dept: CASE MANAGEMENT | Facility: OTHER | Age: 79
End: 2019-10-14

## 2019-10-30 ENCOUNTER — EPISODE CHANGES (OUTPATIENT)
Dept: CASE MANAGEMENT | Facility: OTHER | Age: 79
End: 2019-10-30

## 2019-11-05 ENCOUNTER — EPISODE CHANGES (OUTPATIENT)
Dept: CASE MANAGEMENT | Facility: OTHER | Age: 79
End: 2019-11-05

## 2019-11-14 ENCOUNTER — EPISODE CHANGES (OUTPATIENT)
Dept: CASE MANAGEMENT | Facility: OTHER | Age: 79
End: 2019-11-14

## 2019-11-21 ENCOUNTER — EPISODE CHANGES (OUTPATIENT)
Dept: CASE MANAGEMENT | Facility: OTHER | Age: 79
End: 2019-11-21

## 2019-11-22 ENCOUNTER — PATIENT OUTREACH (OUTPATIENT)
Dept: CASE MANAGEMENT | Facility: OTHER | Age: 79
End: 2019-11-22

## 2019-11-22 ENCOUNTER — EPISODE CHANGES (OUTPATIENT)
Dept: CASE MANAGEMENT | Facility: OTHER | Age: 79
End: 2019-11-22

## 2019-11-22 NOTE — OUTREACH NOTE
Care Coordination Note  Talked with Pretty/ Staff at [facility] (Memory Unit) [phone]. She states patient is long term care resident.     Leia Mayberry RN  Ambulatory     11/22/2019, 9:20 AM

## 2020-01-01 ENCOUNTER — LAB REQUISITION (OUTPATIENT)
Dept: LAB | Facility: HOSPITAL | Age: 80
End: 2020-01-01

## 2020-01-01 DIAGNOSIS — Z00.00 ROUTINE GENERAL MEDICAL EXAMINATION AT A HEALTH CARE FACILITY: ICD-10-CM

## 2020-01-01 LAB
ALBUMIN SERPL-MCNC: 3.1 G/DL (ref 3.5–5.2)
ALBUMIN/GLOB SERPL: 1.5 G/DL
ALP SERPL-CCNC: 132 U/L (ref 39–117)
ALT SERPL W P-5'-P-CCNC: 15 U/L (ref 1–41)
ANION GAP SERPL CALCULATED.3IONS-SCNC: 11 MMOL/L (ref 5–15)
ANION GAP SERPL CALCULATED.3IONS-SCNC: 12.8 MMOL/L (ref 5–15)
AST SERPL-CCNC: 17 U/L (ref 1–40)
BASOPHILS # BLD AUTO: 0.01 10*3/MM3 (ref 0–0.2)
BASOPHILS # BLD AUTO: 0.04 10*3/MM3 (ref 0–0.2)
BASOPHILS NFR BLD AUTO: 0.3 % (ref 0–1.5)
BASOPHILS NFR BLD AUTO: 0.4 % (ref 0–1.5)
BILIRUB SERPL-MCNC: 0.3 MG/DL (ref 0.2–1.2)
BILIRUB UR QL STRIP: NEGATIVE
BUN BLD-MCNC: 16 MG/DL (ref 8–23)
BUN BLD-MCNC: 30 MG/DL (ref 8–23)
BUN/CREAT SERPL: 23.9 (ref 7–25)
BUN/CREAT SERPL: 31.3 (ref 7–25)
CALCIUM SPEC-SCNC: 8.2 MG/DL (ref 8.6–10.5)
CALCIUM SPEC-SCNC: 9.2 MG/DL (ref 8.6–10.5)
CHLORIDE SERPL-SCNC: 102 MMOL/L (ref 98–107)
CHLORIDE SERPL-SCNC: 104 MMOL/L (ref 98–107)
CLARITY UR: CLEAR
CO2 SERPL-SCNC: 26.2 MMOL/L (ref 22–29)
CO2 SERPL-SCNC: 27 MMOL/L (ref 22–29)
COLOR UR: YELLOW
CREAT BLD-MCNC: 0.67 MG/DL (ref 0.76–1.27)
CREAT BLD-MCNC: 0.96 MG/DL (ref 0.76–1.27)
DEPRECATED RDW RBC AUTO: 41.2 FL (ref 37–54)
DEPRECATED RDW RBC AUTO: 41.7 FL (ref 37–54)
EOSINOPHIL # BLD AUTO: 0.04 10*3/MM3 (ref 0–0.4)
EOSINOPHIL # BLD AUTO: 0.06 10*3/MM3 (ref 0–0.4)
EOSINOPHIL NFR BLD AUTO: 0.6 % (ref 0.3–6.2)
EOSINOPHIL NFR BLD AUTO: 1.2 % (ref 0.3–6.2)
ERYTHROCYTE [DISTWIDTH] IN BLOOD BY AUTOMATED COUNT: 12.4 % (ref 12.3–15.4)
ERYTHROCYTE [DISTWIDTH] IN BLOOD BY AUTOMATED COUNT: 12.5 % (ref 12.3–15.4)
GFR SERPL CREATININE-BSD FRML MDRD: 114 ML/MIN/1.73
GFR SERPL CREATININE-BSD FRML MDRD: 75 ML/MIN/1.73
GLOBULIN UR ELPH-MCNC: 2.1 GM/DL
GLUCOSE BLD-MCNC: 114 MG/DL (ref 65–99)
GLUCOSE BLD-MCNC: 141 MG/DL (ref 65–99)
GLUCOSE UR STRIP-MCNC: ABNORMAL MG/DL
HCT VFR BLD AUTO: 30.3 % (ref 37.5–51)
HCT VFR BLD AUTO: 32.8 % (ref 37.5–51)
HGB BLD-MCNC: 10.3 G/DL (ref 13–17.7)
HGB BLD-MCNC: 10.7 G/DL (ref 13–17.7)
HGB UR QL STRIP.AUTO: NEGATIVE
IMM GRANULOCYTES # BLD AUTO: 0.05 10*3/MM3 (ref 0–0.05)
IMM GRANULOCYTES # BLD AUTO: 0.09 10*3/MM3 (ref 0–0.05)
IMM GRANULOCYTES NFR BLD AUTO: 0.5 % (ref 0–0.5)
IMM GRANULOCYTES NFR BLD AUTO: 2.6 % (ref 0–0.5)
KETONES UR QL STRIP: NEGATIVE
LEUKOCYTE ESTERASE UR QL STRIP.AUTO: NEGATIVE
LYMPHOCYTES # BLD AUTO: 0.86 10*3/MM3 (ref 0.7–3.1)
LYMPHOCYTES # BLD AUTO: 1.01 10*3/MM3 (ref 0.7–3.1)
LYMPHOCYTES NFR BLD AUTO: 29.4 % (ref 19.6–45.3)
LYMPHOCYTES NFR BLD AUTO: 9.3 % (ref 19.6–45.3)
MCH RBC QN AUTO: 29.8 PG (ref 26.6–33)
MCH RBC QN AUTO: 31.3 PG (ref 26.6–33)
MCHC RBC AUTO-ENTMCNC: 32.6 G/DL (ref 31.5–35.7)
MCHC RBC AUTO-ENTMCNC: 34 G/DL (ref 31.5–35.7)
MCV RBC AUTO: 91.4 FL (ref 79–97)
MCV RBC AUTO: 92.1 FL (ref 79–97)
MONOCYTES # BLD AUTO: 0.4 10*3/MM3 (ref 0.1–0.9)
MONOCYTES # BLD AUTO: 0.74 10*3/MM3 (ref 0.1–0.9)
MONOCYTES NFR BLD AUTO: 11.7 % (ref 5–12)
MONOCYTES NFR BLD AUTO: 8 % (ref 5–12)
NEUTROPHILS # BLD AUTO: 1.88 10*3/MM3 (ref 1.7–7)
NEUTROPHILS # BLD AUTO: 7.54 10*3/MM3 (ref 1.7–7)
NEUTROPHILS NFR BLD AUTO: 54.8 % (ref 42.7–76)
NEUTROPHILS NFR BLD AUTO: 81.2 % (ref 42.7–76)
NITRITE UR QL STRIP: NEGATIVE
NRBC BLD AUTO-RTO: 0 /100 WBC (ref 0–0.2)
NRBC BLD AUTO-RTO: 0 /100 WBC (ref 0–0.2)
PH UR STRIP.AUTO: <=5 [PH] (ref 5–8)
PLATELET # BLD AUTO: 146 10*3/MM3 (ref 140–450)
PLATELET # BLD AUTO: 147 10*3/MM3 (ref 140–450)
PMV BLD AUTO: 10.6 FL (ref 6–12)
PMV BLD AUTO: 10.6 FL (ref 6–12)
POTASSIUM BLD-SCNC: 4.2 MMOL/L (ref 3.5–5.2)
POTASSIUM BLD-SCNC: 4.4 MMOL/L (ref 3.5–5.2)
PROT SERPL-MCNC: 5.2 G/DL (ref 6–8.5)
PROT UR QL STRIP: NEGATIVE
RBC # BLD AUTO: 3.29 10*6/MM3 (ref 4.14–5.8)
RBC # BLD AUTO: 3.59 10*6/MM3 (ref 4.14–5.8)
SODIUM BLD-SCNC: 141 MMOL/L (ref 136–145)
SODIUM BLD-SCNC: 142 MMOL/L (ref 136–145)
SP GR UR STRIP: >=1.03 (ref 1–1.03)
UROBILINOGEN UR QL STRIP: ABNORMAL
WBC NRBC COR # BLD: 3.43 10*3/MM3 (ref 3.4–10.8)
WBC NRBC COR # BLD: 9.29 10*3/MM3 (ref 3.4–10.8)

## 2020-01-01 PROCEDURE — 80048 BASIC METABOLIC PNL TOTAL CA: CPT

## 2020-01-01 PROCEDURE — 81003 URINALYSIS AUTO W/O SCOPE: CPT

## 2020-01-01 PROCEDURE — 85025 COMPLETE CBC W/AUTO DIFF WBC: CPT

## 2020-01-01 PROCEDURE — 80053 COMPREHEN METABOLIC PANEL: CPT

## 2021-01-01 ENCOUNTER — HOSPITAL ENCOUNTER (INPATIENT)
Facility: HOSPITAL | Age: 81
LOS: 7 days | End: 2021-01-24
Attending: INTERNAL MEDICINE | Admitting: INTERNAL MEDICINE

## 2021-01-01 ENCOUNTER — APPOINTMENT (OUTPATIENT)
Dept: CT IMAGING | Facility: HOSPITAL | Age: 81
End: 2021-01-01

## 2021-01-01 ENCOUNTER — APPOINTMENT (OUTPATIENT)
Dept: GENERAL RADIOLOGY | Facility: HOSPITAL | Age: 81
End: 2021-01-01

## 2021-01-01 ENCOUNTER — HOSPITAL ENCOUNTER (EMERGENCY)
Facility: HOSPITAL | Age: 81
Discharge: NURSING, REHAB OR LONG-TERM CARE | End: 2021-01-07
Attending: EMERGENCY MEDICINE | Admitting: EMERGENCY MEDICINE

## 2021-01-01 ENCOUNTER — HOSPITAL ENCOUNTER (INPATIENT)
Facility: HOSPITAL | Age: 81
LOS: 4 days | Discharge: HOSPICE | End: 2021-01-17
Attending: EMERGENCY MEDICINE | Admitting: INTERNAL MEDICINE

## 2021-01-01 VITALS
OXYGEN SATURATION: 99 % | RESPIRATION RATE: 18 BRPM | SYSTOLIC BLOOD PRESSURE: 123 MMHG | HEIGHT: 70 IN | HEART RATE: 91 BPM | TEMPERATURE: 97.8 F | DIASTOLIC BLOOD PRESSURE: 91 MMHG | BODY MASS INDEX: 24.05 KG/M2 | WEIGHT: 168 LBS

## 2021-01-01 VITALS
OXYGEN SATURATION: 80 % | SYSTOLIC BLOOD PRESSURE: 93 MMHG | DIASTOLIC BLOOD PRESSURE: 55 MMHG | RESPIRATION RATE: 8 BRPM | HEART RATE: 82 BPM | TEMPERATURE: 99.5 F

## 2021-01-01 VITALS
HEIGHT: 70 IN | HEART RATE: 74 BPM | SYSTOLIC BLOOD PRESSURE: 94 MMHG | DIASTOLIC BLOOD PRESSURE: 61 MMHG | BODY MASS INDEX: 19.9 KG/M2 | OXYGEN SATURATION: 88 % | TEMPERATURE: 97.2 F | RESPIRATION RATE: 18 BRPM | WEIGHT: 139 LBS

## 2021-01-01 DIAGNOSIS — E86.9 VOLUME DEPLETION: ICD-10-CM

## 2021-01-01 DIAGNOSIS — E87.0 HYPERNATREMIA: ICD-10-CM

## 2021-01-01 DIAGNOSIS — J93.81 CHRONIC PNEUMOTHORAX: ICD-10-CM
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
## 2021-01-01 DIAGNOSIS — I21.4 NSTEMI (NON-ST ELEVATED MYOCARDIAL INFARCTION) (HCC): ICD-10-CM

## 2021-01-01 DIAGNOSIS — A41.9 SEPSIS, DUE TO UNSPECIFIED ORGANISM, UNSPECIFIED WHETHER ACUTE ORGAN DYSFUNCTION PRESENT (HCC): Primary | ICD-10-CM

## 2021-01-01 DIAGNOSIS — S01.112A EYEBROW LACERATION, LEFT, INITIAL ENCOUNTER: Primary | ICD-10-CM

## 2021-01-01 LAB
ALBUMIN SERPL-MCNC: 2.6 G/DL (ref 3.5–5.2)
ALBUMIN SERPL-MCNC: 3 G/DL (ref 3.5–5.2)
ALBUMIN SERPL-MCNC: 3.8 G/DL (ref 3.5–5.2)
ALBUMIN/GLOB SERPL: 0.8 G/DL
ALBUMIN/GLOB SERPL: 1 G/DL
ALP SERPL-CCNC: 114 U/L (ref 39–117)
ALP SERPL-CCNC: 136 U/L (ref 39–117)
ALT SERPL W P-5'-P-CCNC: 25 U/L (ref 1–41)
ALT SERPL W P-5'-P-CCNC: 27 U/L (ref 1–41)
ANION GAP SERPL CALCULATED.3IONS-SCNC: 10.3 MMOL/L (ref 5–15)
ANION GAP SERPL CALCULATED.3IONS-SCNC: 10.7 MMOL/L (ref 5–15)
ANION GAP SERPL CALCULATED.3IONS-SCNC: 12 MMOL/L (ref 5–15)
ANION GAP SERPL CALCULATED.3IONS-SCNC: 13.9 MMOL/L (ref 5–15)
ANION GAP SERPL CALCULATED.3IONS-SCNC: 13.9 MMOL/L (ref 5–15)
ANION GAP SERPL CALCULATED.3IONS-SCNC: 15.9 MMOL/L (ref 5–15)
ANION GAP SERPL CALCULATED.3IONS-SCNC: 16.7 MMOL/L (ref 5–15)
ANION GAP SERPL CALCULATED.3IONS-SCNC: 8 MMOL/L (ref 5–15)
ANION GAP SERPL CALCULATED.3IONS-SCNC: 8.7 MMOL/L (ref 5–15)
ANION GAP SERPL CALCULATED.3IONS-SCNC: 9.4 MMOL/L (ref 5–15)
AST SERPL-CCNC: 19 U/L (ref 1–40)
AST SERPL-CCNC: 39 U/L (ref 1–40)
B PARAPERT DNA SPEC QL NAA+PROBE: NOT DETECTED
B PERT DNA SPEC QL NAA+PROBE: NOT DETECTED
BACTERIA SPEC AEROBE CULT: NORMAL
BACTERIA SPEC AEROBE CULT: NORMAL
BACTERIA UR QL AUTO: ABNORMAL /HPF
BASOPHILS # BLD AUTO: 0.07 10*3/MM3 (ref 0–0.2)
BASOPHILS NFR BLD AUTO: 0.5 % (ref 0–1.5)
BILIRUB SERPL-MCNC: 0.6 MG/DL (ref 0–1.2)
BILIRUB SERPL-MCNC: 0.8 MG/DL (ref 0–1.2)
BILIRUB UR QL STRIP: NEGATIVE
BUN SERPL-MCNC: 23 MG/DL (ref 8–23)
BUN SERPL-MCNC: 26 MG/DL (ref 8–23)
BUN SERPL-MCNC: 29 MG/DL (ref 8–23)
BUN SERPL-MCNC: 33 MG/DL (ref 8–23)
BUN SERPL-MCNC: 36 MG/DL (ref 8–23)
BUN SERPL-MCNC: 40 MG/DL (ref 8–23)
BUN SERPL-MCNC: 41 MG/DL (ref 8–23)
BUN SERPL-MCNC: 44 MG/DL (ref 8–23)
BUN SERPL-MCNC: 46 MG/DL (ref 8–23)
BUN SERPL-MCNC: 47 MG/DL (ref 8–23)
BUN/CREAT SERPL: 28.4 (ref 7–25)
BUN/CREAT SERPL: 34.2 (ref 7–25)
BUN/CREAT SERPL: 35.5 (ref 7–25)
BUN/CREAT SERPL: 36.3 (ref 7–25)
BUN/CREAT SERPL: 37.5 (ref 7–25)
BUN/CREAT SERPL: 38.5 (ref 7–25)
BUN/CREAT SERPL: 43.5 (ref 7–25)
BUN/CREAT SERPL: 45.1 (ref 7–25)
BUN/CREAT SERPL: 47.3 (ref 7–25)
BUN/CREAT SERPL: 50 (ref 7–25)
C PNEUM DNA NPH QL NAA+NON-PROBE: NOT DETECTED
CALCIUM SPEC-SCNC: 7.9 MG/DL (ref 8.6–10.5)
CALCIUM SPEC-SCNC: 7.9 MG/DL (ref 8.6–10.5)
CALCIUM SPEC-SCNC: 8.2 MG/DL (ref 8.6–10.5)
CALCIUM SPEC-SCNC: 8.3 MG/DL (ref 8.6–10.5)
CALCIUM SPEC-SCNC: 8.3 MG/DL (ref 8.6–10.5)
CALCIUM SPEC-SCNC: 8.5 MG/DL (ref 8.6–10.5)
CALCIUM SPEC-SCNC: 8.7 MG/DL (ref 8.6–10.5)
CALCIUM SPEC-SCNC: 9.5 MG/DL (ref 8.6–10.5)
CHLORIDE SERPL-SCNC: 113 MMOL/L (ref 98–107)
CHLORIDE SERPL-SCNC: 115 MMOL/L (ref 98–107)
CHLORIDE SERPL-SCNC: 115 MMOL/L (ref 98–107)
CHLORIDE SERPL-SCNC: 116 MMOL/L (ref 98–107)
CHLORIDE SERPL-SCNC: 117 MMOL/L (ref 98–107)
CHLORIDE SERPL-SCNC: 117 MMOL/L (ref 98–107)
CHLORIDE SERPL-SCNC: 118 MMOL/L (ref 98–107)
CHLORIDE SERPL-SCNC: 119 MMOL/L (ref 98–107)
CHLORIDE SERPL-SCNC: 120 MMOL/L (ref 98–107)
CHLORIDE SERPL-SCNC: 122 MMOL/L (ref 98–107)
CLARITY UR: CLEAR
CO2 SERPL-SCNC: 21.3 MMOL/L (ref 22–29)
CO2 SERPL-SCNC: 23.1 MMOL/L (ref 22–29)
CO2 SERPL-SCNC: 24.1 MMOL/L (ref 22–29)
CO2 SERPL-SCNC: 25 MMOL/L (ref 22–29)
CO2 SERPL-SCNC: 26 MMOL/L (ref 22–29)
CO2 SERPL-SCNC: 26.3 MMOL/L (ref 22–29)
CO2 SERPL-SCNC: 26.6 MMOL/L (ref 22–29)
CO2 SERPL-SCNC: 26.7 MMOL/L (ref 22–29)
CO2 SERPL-SCNC: 27.3 MMOL/L (ref 22–29)
CO2 SERPL-SCNC: 31.1 MMOL/L (ref 22–29)
COLOR UR: YELLOW
CREAT SERPL-MCNC: 0.76 MG/DL (ref 0.76–1.27)
CREAT SERPL-MCNC: 0.8 MG/DL (ref 0.76–1.27)
CREAT SERPL-MCNC: 0.81 MG/DL (ref 0.76–1.27)
CREAT SERPL-MCNC: 0.91 MG/DL (ref 0.76–1.27)
CREAT SERPL-MCNC: 0.92 MG/DL (ref 0.76–1.27)
CREAT SERPL-MCNC: 0.93 MG/DL (ref 0.76–1.27)
CREAT SERPL-MCNC: 0.93 MG/DL (ref 0.76–1.27)
CREAT SERPL-MCNC: 0.96 MG/DL (ref 0.76–1.27)
CREAT SERPL-MCNC: 1.04 MG/DL (ref 0.76–1.27)
CREAT SERPL-MCNC: 1.08 MG/DL (ref 0.76–1.27)
D-LACTATE SERPL-SCNC: 1.9 MMOL/L (ref 0.5–2)
D-LACTATE SERPL-SCNC: 3.9 MMOL/L (ref 0.5–2)
DEPRECATED RDW RBC AUTO: 43 FL (ref 37–54)
DEPRECATED RDW RBC AUTO: 43 FL (ref 37–54)
DEPRECATED RDW RBC AUTO: 43.4 FL (ref 37–54)
EOSINOPHIL # BLD AUTO: 0 10*3/MM3 (ref 0–0.4)
EOSINOPHIL NFR BLD AUTO: 0 % (ref 0.3–6.2)
ERYTHROCYTE [DISTWIDTH] IN BLOOD BY AUTOMATED COUNT: 12.7 % (ref 12.3–15.4)
ERYTHROCYTE [DISTWIDTH] IN BLOOD BY AUTOMATED COUNT: 12.8 % (ref 12.3–15.4)
ERYTHROCYTE [DISTWIDTH] IN BLOOD BY AUTOMATED COUNT: 12.9 % (ref 12.3–15.4)
FLUAV SUBTYP SPEC NAA+PROBE: NOT DETECTED
FLUBV RNA ISLT QL NAA+PROBE: NOT DETECTED
GFR SERPL CREATININE-BSD FRML MDRD: 66 ML/MIN/1.73
GFR SERPL CREATININE-BSD FRML MDRD: 69 ML/MIN/1.73
GFR SERPL CREATININE-BSD FRML MDRD: 75 ML/MIN/1.73
GFR SERPL CREATININE-BSD FRML MDRD: 78 ML/MIN/1.73
GFR SERPL CREATININE-BSD FRML MDRD: 78 ML/MIN/1.73
GFR SERPL CREATININE-BSD FRML MDRD: 79 ML/MIN/1.73
GFR SERPL CREATININE-BSD FRML MDRD: 80 ML/MIN/1.73
GFR SERPL CREATININE-BSD FRML MDRD: 92 ML/MIN/1.73
GFR SERPL CREATININE-BSD FRML MDRD: 93 ML/MIN/1.73
GFR SERPL CREATININE-BSD FRML MDRD: 99 ML/MIN/1.73
GLOBULIN UR ELPH-MCNC: 3.9 GM/DL
GLOBULIN UR ELPH-MCNC: 4 GM/DL
GLUCOSE BLDC GLUCOMTR-MCNC: 134 MG/DL (ref 70–130)
GLUCOSE BLDC GLUCOMTR-MCNC: 147 MG/DL (ref 70–130)
GLUCOSE BLDC GLUCOMTR-MCNC: 148 MG/DL (ref 70–130)
GLUCOSE BLDC GLUCOMTR-MCNC: 150 MG/DL (ref 70–130)
GLUCOSE BLDC GLUCOMTR-MCNC: 166 MG/DL (ref 70–130)
GLUCOSE BLDC GLUCOMTR-MCNC: 171 MG/DL (ref 70–130)
GLUCOSE BLDC GLUCOMTR-MCNC: 174 MG/DL (ref 70–130)
GLUCOSE BLDC GLUCOMTR-MCNC: 189 MG/DL (ref 70–130)
GLUCOSE BLDC GLUCOMTR-MCNC: 194 MG/DL (ref 70–130)
GLUCOSE SERPL-MCNC: 129 MG/DL (ref 65–99)
GLUCOSE SERPL-MCNC: 131 MG/DL (ref 65–99)
GLUCOSE SERPL-MCNC: 150 MG/DL (ref 65–99)
GLUCOSE SERPL-MCNC: 152 MG/DL (ref 65–99)
GLUCOSE SERPL-MCNC: 152 MG/DL (ref 65–99)
GLUCOSE SERPL-MCNC: 166 MG/DL (ref 65–99)
GLUCOSE SERPL-MCNC: 169 MG/DL (ref 65–99)
GLUCOSE SERPL-MCNC: 178 MG/DL (ref 65–99)
GLUCOSE SERPL-MCNC: 184 MG/DL (ref 65–99)
GLUCOSE SERPL-MCNC: 187 MG/DL (ref 65–99)
GLUCOSE UR STRIP-MCNC: ABNORMAL MG/DL
HADV DNA SPEC NAA+PROBE: NOT DETECTED
HBA1C MFR BLD: 6.58 % (ref 4.8–5.6)
HCOV 229E RNA SPEC QL NAA+PROBE: NOT DETECTED
HCOV HKU1 RNA SPEC QL NAA+PROBE: NOT DETECTED
HCOV NL63 RNA SPEC QL NAA+PROBE: NOT DETECTED
HCOV OC43 RNA SPEC QL NAA+PROBE: NOT DETECTED
HCT VFR BLD AUTO: 36.3 % (ref 37.5–51)
HCT VFR BLD AUTO: 42.9 % (ref 37.5–51)
HCT VFR BLD AUTO: 45 % (ref 37.5–51)
HGB BLD-MCNC: 11.9 G/DL (ref 13–17.7)
HGB BLD-MCNC: 13.5 G/DL (ref 13–17.7)
HGB BLD-MCNC: 14.6 G/DL (ref 13–17.7)
HGB UR QL STRIP.AUTO: ABNORMAL
HMPV RNA NPH QL NAA+NON-PROBE: NOT DETECTED
HPIV1 RNA SPEC QL NAA+PROBE: NOT DETECTED
HPIV2 RNA SPEC QL NAA+PROBE: NOT DETECTED
HPIV3 RNA NPH QL NAA+PROBE: NOT DETECTED
HPIV4 P GENE NPH QL NAA+PROBE: NOT DETECTED
HYALINE CASTS UR QL AUTO: ABNORMAL /LPF
IMM GRANULOCYTES # BLD AUTO: 0.08 10*3/MM3 (ref 0–0.05)
IMM GRANULOCYTES NFR BLD AUTO: 0.5 % (ref 0–0.5)
INR PPP: 1.48 (ref 0.9–1.1)
KETONES UR QL STRIP: ABNORMAL
LACTATE HOLD SPECIMEN: NORMAL
LEUKOCYTE ESTERASE UR QL STRIP.AUTO: NEGATIVE
LYMPHOCYTES # BLD AUTO: 0.68 10*3/MM3 (ref 0.7–3.1)
LYMPHOCYTES NFR BLD AUTO: 4.5 % (ref 19.6–45.3)
M PNEUMO IGG SER IA-ACNC: NOT DETECTED
MAGNESIUM SERPL-MCNC: 2.3 MG/DL (ref 1.6–2.4)
MCH RBC QN AUTO: 29.2 PG (ref 26.6–33)
MCH RBC QN AUTO: 29.6 PG (ref 26.6–33)
MCH RBC QN AUTO: 30.5 PG (ref 26.6–33)
MCHC RBC AUTO-ENTMCNC: 31.5 G/DL (ref 31.5–35.7)
MCHC RBC AUTO-ENTMCNC: 32.4 G/DL (ref 31.5–35.7)
MCHC RBC AUTO-ENTMCNC: 32.8 G/DL (ref 31.5–35.7)
MCV RBC AUTO: 91.3 FL (ref 79–97)
MCV RBC AUTO: 92.9 FL (ref 79–97)
MCV RBC AUTO: 93.1 FL (ref 79–97)
MONOCYTES # BLD AUTO: 0.55 10*3/MM3 (ref 0.1–0.9)
MONOCYTES NFR BLD AUTO: 3.7 % (ref 5–12)
MRSA DNA SPEC QL NAA+PROBE: NORMAL
NEUTROPHILS NFR BLD AUTO: 13.66 10*3/MM3 (ref 1.7–7)
NEUTROPHILS NFR BLD AUTO: 90.8 % (ref 42.7–76)
NITRITE UR QL STRIP: NEGATIVE
NRBC BLD AUTO-RTO: 0 /100 WBC (ref 0–0.2)
OSMOLALITY UR: 709 MOSM/KG
PH UR STRIP.AUTO: <=5 [PH] (ref 5–8)
PHOSPHATE SERPL-MCNC: 2.1 MG/DL (ref 2.5–4.5)
PLATELET # BLD AUTO: 157 10*3/MM3 (ref 140–450)
PLATELET # BLD AUTO: 199 10*3/MM3 (ref 140–450)
PLATELET # BLD AUTO: 231 10*3/MM3 (ref 140–450)
PMV BLD AUTO: 10.7 FL (ref 6–12)
PMV BLD AUTO: 10.8 FL (ref 6–12)
PMV BLD AUTO: 11.2 FL (ref 6–12)
POTASSIUM SERPL-SCNC: 2.9 MMOL/L (ref 3.5–5.2)
POTASSIUM SERPL-SCNC: 3.1 MMOL/L (ref 3.5–5.2)
POTASSIUM SERPL-SCNC: 3.2 MMOL/L (ref 3.5–5.2)
POTASSIUM SERPL-SCNC: 3.3 MMOL/L (ref 3.5–5.2)
POTASSIUM SERPL-SCNC: 3.4 MMOL/L (ref 3.5–5.2)
POTASSIUM SERPL-SCNC: 3.4 MMOL/L (ref 3.5–5.2)
POTASSIUM SERPL-SCNC: 3.6 MMOL/L (ref 3.5–5.2)
POTASSIUM SERPL-SCNC: 3.6 MMOL/L (ref 3.5–5.2)
PROCALCITONIN SERPL-MCNC: 0.28 NG/ML (ref 0–0.25)
PROCALCITONIN SERPL-MCNC: 0.64 NG/ML (ref 0–0.25)
PROT SERPL-MCNC: 6.9 G/DL (ref 6–8.5)
PROT SERPL-MCNC: 7.8 G/DL (ref 6–8.5)
PROT UR QL STRIP: NEGATIVE
PROTHROMBIN TIME: 17.7 SECONDS (ref 11.7–14.2)
QT INTERVAL: 441 MS
RBC # BLD AUTO: 3.9 10*6/MM3 (ref 4.14–5.8)
RBC # BLD AUTO: 4.62 10*6/MM3 (ref 4.14–5.8)
RBC # BLD AUTO: 4.93 10*6/MM3 (ref 4.14–5.8)
RBC # UR: ABNORMAL /HPF
REF LAB TEST METHOD: ABNORMAL
RHINOVIRUS RNA SPEC NAA+PROBE: NOT DETECTED
RSV RNA NPH QL NAA+NON-PROBE: NOT DETECTED
SARS-COV-2 RNA NPH QL NAA+NON-PROBE: NOT DETECTED
SODIUM SERPL-SCNC: 146 MMOL/L (ref 136–145)
SODIUM SERPL-SCNC: 151 MMOL/L (ref 136–145)
SODIUM SERPL-SCNC: 152 MMOL/L (ref 136–145)
SODIUM SERPL-SCNC: 154 MMOL/L (ref 136–145)
SODIUM SERPL-SCNC: 154 MMOL/L (ref 136–145)
SODIUM SERPL-SCNC: 156 MMOL/L (ref 136–145)
SODIUM SERPL-SCNC: 157 MMOL/L (ref 136–145)
SODIUM SERPL-SCNC: 158 MMOL/L (ref 136–145)
SODIUM SERPL-SCNC: 159 MMOL/L (ref 136–145)
SODIUM SERPL-SCNC: 162 MMOL/L (ref 136–145)
SODIUM UR-SCNC: 35 MMOL/L
SP GR UR STRIP: >=1.03 (ref 1–1.03)
SQUAMOUS #/AREA URNS HPF: ABNORMAL /HPF
TROPONIN T SERPL-MCNC: 0.04 NG/ML (ref 0–0.03)
TROPONIN T SERPL-MCNC: 0.04 NG/ML (ref 0–0.03)
TROPONIN T SERPL-MCNC: 0.05 NG/ML (ref 0–0.03)
TROPONIN T SERPL-MCNC: 0.06 NG/ML (ref 0–0.03)
TSH SERPL DL<=0.05 MIU/L-ACNC: 3.33 UIU/ML (ref 0.27–4.2)
URATE SERPL-MCNC: 3.2 MG/DL (ref 3.4–7)
UROBILINOGEN UR QL STRIP: ABNORMAL
WBC # BLD AUTO: 12.65 10*3/MM3 (ref 3.4–10.8)
WBC # BLD AUTO: 15.04 10*3/MM3 (ref 3.4–10.8)
WBC # BLD AUTO: 9.76 10*3/MM3 (ref 3.4–10.8)
WBC UR QL AUTO: ABNORMAL /HPF

## 2021-01-01 PROCEDURE — 82962 GLUCOSE BLOOD TEST: CPT

## 2021-01-01 PROCEDURE — 25010000002 MORPHINE PER 10 MG: Performed by: NURSE PRACTITIONER

## 2021-01-01 PROCEDURE — 84145 PROCALCITONIN (PCT): CPT | Performed by: EMERGENCY MEDICINE

## 2021-01-01 PROCEDURE — 83935 ASSAY OF URINE OSMOLALITY: CPT | Performed by: INTERNAL MEDICINE

## 2021-01-01 PROCEDURE — 25010000002 PIPERACILLIN SOD-TAZOBACTAM PER 1 G: Performed by: NURSE PRACTITIONER

## 2021-01-01 PROCEDURE — 70450 CT HEAD/BRAIN W/O DYE: CPT

## 2021-01-01 PROCEDURE — 25010000002 LORAZEPAM PER 2 MG: Performed by: INTERNAL MEDICINE

## 2021-01-01 PROCEDURE — 85027 COMPLETE CBC AUTOMATED: CPT | Performed by: INTERNAL MEDICINE

## 2021-01-01 PROCEDURE — 25010000002 HYDROMORPHONE 1 MG/ML SOLUTION: Performed by: INTERNAL MEDICINE

## 2021-01-01 PROCEDURE — 71250 CT THORAX DX C-: CPT

## 2021-01-01 PROCEDURE — 93005 ELECTROCARDIOGRAM TRACING: CPT | Performed by: EMERGENCY MEDICINE

## 2021-01-01 PROCEDURE — 80048 BASIC METABOLIC PNL TOTAL CA: CPT | Performed by: INTERNAL MEDICINE

## 2021-01-01 PROCEDURE — 80053 COMPREHEN METABOLIC PANEL: CPT | Performed by: EMERGENCY MEDICINE

## 2021-01-01 PROCEDURE — 36415 COLL VENOUS BLD VENIPUNCTURE: CPT | Performed by: EMERGENCY MEDICINE

## 2021-01-01 PROCEDURE — 85025 COMPLETE CBC W/AUTO DIFF WBC: CPT | Performed by: EMERGENCY MEDICINE

## 2021-01-01 PROCEDURE — 25010000002 LORAZEPAM PER 2 MG: Performed by: HOSPITALIST

## 2021-01-01 PROCEDURE — 84484 ASSAY OF TROPONIN QUANT: CPT | Performed by: EMERGENCY MEDICINE

## 2021-01-01 PROCEDURE — 71045 X-RAY EXAM CHEST 1 VIEW: CPT

## 2021-01-01 PROCEDURE — 25010000002 MORPHINE PER 10 MG: Performed by: HOSPITALIST

## 2021-01-01 PROCEDURE — 84300 ASSAY OF URINE SODIUM: CPT | Performed by: INTERNAL MEDICINE

## 2021-01-01 PROCEDURE — 80069 RENAL FUNCTION PANEL: CPT | Performed by: INTERNAL MEDICINE

## 2021-01-01 PROCEDURE — 63710000001 INSULIN LISPRO (HUMAN) PER 5 UNITS: Performed by: NURSE PRACTITIONER

## 2021-01-01 PROCEDURE — 99222 1ST HOSP IP/OBS MODERATE 55: CPT | Performed by: INTERNAL MEDICINE

## 2021-01-01 PROCEDURE — 83735 ASSAY OF MAGNESIUM: CPT | Performed by: INTERNAL MEDICINE

## 2021-01-01 PROCEDURE — 99283 EMERGENCY DEPT VISIT LOW MDM: CPT

## 2021-01-01 PROCEDURE — 85610 PROTHROMBIN TIME: CPT | Performed by: EMERGENCY MEDICINE

## 2021-01-01 PROCEDURE — 25010000003 POTASSIUM CHLORIDE 10 MEQ/100ML SOLUTION: Performed by: NURSE PRACTITIONER

## 2021-01-01 PROCEDURE — 0202U NFCT DS 22 TRGT SARS-COV-2: CPT | Performed by: EMERGENCY MEDICINE

## 2021-01-01 PROCEDURE — 83036 HEMOGLOBIN GLYCOSYLATED A1C: CPT | Performed by: NURSE PRACTITIONER

## 2021-01-01 PROCEDURE — 84550 ASSAY OF BLOOD/URIC ACID: CPT | Performed by: INTERNAL MEDICINE

## 2021-01-01 PROCEDURE — 80053 COMPREHEN METABOLIC PANEL: CPT | Performed by: INTERNAL MEDICINE

## 2021-01-01 PROCEDURE — 93005 ELECTROCARDIOGRAM TRACING: CPT | Performed by: NURSE PRACTITIONER

## 2021-01-01 PROCEDURE — 84443 ASSAY THYROID STIM HORMONE: CPT | Performed by: INTERNAL MEDICINE

## 2021-01-01 PROCEDURE — 81001 URINALYSIS AUTO W/SCOPE: CPT | Performed by: EMERGENCY MEDICINE

## 2021-01-01 PROCEDURE — 85027 COMPLETE CBC AUTOMATED: CPT | Performed by: NURSE PRACTITIONER

## 2021-01-01 PROCEDURE — 25010000002 VANCOMYCIN 10 G RECONSTITUTED SOLUTION: Performed by: EMERGENCY MEDICINE

## 2021-01-01 PROCEDURE — 87641 MR-STAPH DNA AMP PROBE: CPT | Performed by: NURSE PRACTITIONER

## 2021-01-01 PROCEDURE — 84484 ASSAY OF TROPONIN QUANT: CPT | Performed by: NURSE PRACTITIONER

## 2021-01-01 PROCEDURE — 25010000002 PIPERACILLIN SOD-TAZOBACTAM PER 1 G: Performed by: EMERGENCY MEDICINE

## 2021-01-01 PROCEDURE — 83605 ASSAY OF LACTIC ACID: CPT | Performed by: EMERGENCY MEDICINE

## 2021-01-01 PROCEDURE — 25010000002 MORPHINE PER 10 MG: Performed by: INTERNAL MEDICINE

## 2021-01-01 PROCEDURE — 87040 BLOOD CULTURE FOR BACTERIA: CPT | Performed by: EMERGENCY MEDICINE

## 2021-01-01 PROCEDURE — 93010 ELECTROCARDIOGRAM REPORT: CPT | Performed by: INTERNAL MEDICINE

## 2021-01-01 PROCEDURE — 99285 EMERGENCY DEPT VISIT HI MDM: CPT

## 2021-01-01 PROCEDURE — 84145 PROCALCITONIN (PCT): CPT | Performed by: NURSE PRACTITIONER

## 2021-01-01 PROCEDURE — P9612 CATHETERIZE FOR URINE SPEC: HCPCS

## 2021-01-01 PROCEDURE — 99238 HOSP IP/OBS DSCHRG MGMT 30/<: CPT | Performed by: INTERNAL MEDICINE

## 2021-01-01 RX ORDER — ACETAMINOPHEN 160 MG/5ML
650 SOLUTION ORAL EVERY 4 HOURS PRN
Status: CANCELLED | OUTPATIENT
Start: 2021-01-01

## 2021-01-01 RX ORDER — LORAZEPAM 2 MG/ML
0.5 INJECTION INTRAMUSCULAR
Status: DISCONTINUED | OUTPATIENT
Start: 2021-01-01 | End: 2021-01-01 | Stop reason: HOSPADM

## 2021-01-01 RX ORDER — MORPHINE SULFATE 4 MG/ML
4 INJECTION, SOLUTION INTRAMUSCULAR; INTRAVENOUS
Status: CANCELLED | OUTPATIENT
Start: 2021-01-01 | End: 2021-01-01

## 2021-01-01 RX ORDER — HYDROMORPHONE HYDROCHLORIDE 1 MG/ML
0.5 INJECTION, SOLUTION INTRAMUSCULAR; INTRAVENOUS; SUBCUTANEOUS
Status: DISCONTINUED | OUTPATIENT
Start: 2021-01-01 | End: 2021-01-01 | Stop reason: HOSPADM

## 2021-01-01 RX ORDER — DEXTROSE MONOHYDRATE 25 G/50ML
25 INJECTION, SOLUTION INTRAVENOUS
Status: DISCONTINUED | OUTPATIENT
Start: 2021-01-01 | End: 2021-01-01

## 2021-01-01 RX ORDER — LORAZEPAM 2 MG/ML
1 INJECTION INTRAMUSCULAR
Status: CANCELLED | OUTPATIENT
Start: 2021-01-01 | End: 2021-01-26

## 2021-01-01 RX ORDER — LORAZEPAM 2 MG/ML
1 CONCENTRATE ORAL
Status: CANCELLED | OUTPATIENT
Start: 2021-01-01 | End: 2021-01-25

## 2021-01-01 RX ORDER — LORAZEPAM 2 MG/ML
1 INJECTION INTRAMUSCULAR
Status: CANCELLED | OUTPATIENT
Start: 2021-01-01 | End: 2021-01-01

## 2021-01-01 RX ORDER — LORAZEPAM 2 MG/ML
0.5 INJECTION INTRAMUSCULAR
Status: DISCONTINUED | OUTPATIENT
Start: 2021-01-01 | End: 2021-01-01

## 2021-01-01 RX ORDER — LORAZEPAM 2 MG/ML
0.5 INJECTION INTRAMUSCULAR
Status: CANCELLED | OUTPATIENT
Start: 2021-01-01 | End: 2021-01-25

## 2021-01-01 RX ORDER — MORPHINE SULFATE 20 MG/ML
5 SOLUTION ORAL
Status: DISCONTINUED | OUTPATIENT
Start: 2021-01-01 | End: 2021-01-01

## 2021-01-01 RX ORDER — SODIUM CHLORIDE 450 MG/100ML
125 INJECTION, SOLUTION INTRAVENOUS CONTINUOUS
Status: DISCONTINUED | OUTPATIENT
Start: 2021-01-01 | End: 2021-01-01

## 2021-01-01 RX ORDER — LORAZEPAM 2 MG/ML
2 INJECTION INTRAMUSCULAR
Status: DISCONTINUED | OUTPATIENT
Start: 2021-01-01 | End: 2021-01-01

## 2021-01-01 RX ORDER — OLANZAPINE 10 MG/1
5 INJECTION, POWDER, LYOPHILIZED, FOR SOLUTION INTRAMUSCULAR ONCE AS NEEDED
Status: CANCELLED | OUTPATIENT
Start: 2021-01-01

## 2021-01-01 RX ORDER — NICOTINE POLACRILEX 4 MG
15 LOZENGE BUCCAL
Status: DISCONTINUED | OUTPATIENT
Start: 2021-01-01 | End: 2021-01-01

## 2021-01-01 RX ORDER — SODIUM CHLORIDE 0.9 % (FLUSH) 0.9 %
10 SYRINGE (ML) INJECTION AS NEEDED
Status: DISCONTINUED | OUTPATIENT
Start: 2021-01-01 | End: 2021-01-01 | Stop reason: HOSPADM

## 2021-01-01 RX ORDER — LORAZEPAM 2 MG/ML
2 CONCENTRATE ORAL
Status: DISCONTINUED | OUTPATIENT
Start: 2021-01-01 | End: 2021-01-01

## 2021-01-01 RX ORDER — LORAZEPAM 2 MG/ML
1 INJECTION INTRAMUSCULAR
Status: CANCELLED | OUTPATIENT
Start: 2021-01-01 | End: 2021-01-25

## 2021-01-01 RX ORDER — SCOLOPAMINE TRANSDERMAL SYSTEM 1 MG/1
1 PATCH, EXTENDED RELEASE TRANSDERMAL
Status: DISCONTINUED | OUTPATIENT
Start: 2021-01-01 | End: 2021-01-01

## 2021-01-01 RX ORDER — ONDANSETRON 2 MG/ML
4 INJECTION INTRAMUSCULAR; INTRAVENOUS EVERY 6 HOURS PRN
Status: DISCONTINUED | OUTPATIENT
Start: 2021-01-01 | End: 2021-01-25 | Stop reason: HOSPADM

## 2021-01-01 RX ORDER — ACETAMINOPHEN 160 MG/5ML
650 SOLUTION ORAL EVERY 4 HOURS PRN
Status: DISCONTINUED | OUTPATIENT
Start: 2021-01-01 | End: 2021-01-01

## 2021-01-01 RX ORDER — HYDROMORPHONE HYDROCHLORIDE 1 MG/ML
0.5 INJECTION, SOLUTION INTRAMUSCULAR; INTRAVENOUS; SUBCUTANEOUS
Status: DISCONTINUED | OUTPATIENT
Start: 2021-01-01 | End: 2021-01-25 | Stop reason: HOSPADM

## 2021-01-01 RX ORDER — SCOLOPAMINE TRANSDERMAL SYSTEM 1 MG/1
1 PATCH, EXTENDED RELEASE TRANSDERMAL
Status: CANCELLED | OUTPATIENT
Start: 2021-01-01

## 2021-01-01 RX ORDER — ACETAMINOPHEN 325 MG/1
650 TABLET ORAL EVERY 4 HOURS PRN
Status: DISCONTINUED | OUTPATIENT
Start: 2021-01-01 | End: 2021-01-01 | Stop reason: HOSPADM

## 2021-01-01 RX ORDER — MORPHINE SULFATE 20 MG/ML
5 SOLUTION ORAL
Status: CANCELLED | OUTPATIENT
Start: 2021-01-01 | End: 2021-01-01

## 2021-01-01 RX ORDER — HYDROMORPHONE HYDROCHLORIDE 1 MG/ML
0.5 INJECTION, SOLUTION INTRAMUSCULAR; INTRAVENOUS; SUBCUTANEOUS
Status: DISCONTINUED | OUTPATIENT
Start: 2021-01-01 | End: 2021-01-01

## 2021-01-01 RX ORDER — ACETAMINOPHEN 325 MG/1
650 TABLET ORAL EVERY 4 HOURS PRN
Status: CANCELLED | OUTPATIENT
Start: 2021-01-01

## 2021-01-01 RX ORDER — LORAZEPAM 2 MG/ML
1 CONCENTRATE ORAL
Status: CANCELLED | OUTPATIENT
Start: 2021-01-01 | End: 2021-01-26

## 2021-01-01 RX ORDER — KETOROLAC TROMETHAMINE 30 MG/ML
15 INJECTION, SOLUTION INTRAMUSCULAR; INTRAVENOUS EVERY 6 HOURS PRN
Status: DISCONTINUED | OUTPATIENT
Start: 2021-01-01 | End: 2021-01-01

## 2021-01-01 RX ORDER — MORPHINE SULFATE 20 MG/ML
5 SOLUTION ORAL
Status: DISCONTINUED | OUTPATIENT
Start: 2021-01-01 | End: 2021-01-01 | Stop reason: HOSPADM

## 2021-01-01 RX ORDER — LORAZEPAM 2 MG/ML
2 CONCENTRATE ORAL
Status: CANCELLED | OUTPATIENT
Start: 2021-01-01 | End: 2021-01-25

## 2021-01-01 RX ORDER — ACETAMINOPHEN 650 MG/1
650 SUPPOSITORY RECTAL EVERY 4 HOURS PRN
Status: DISCONTINUED | OUTPATIENT
Start: 2021-01-01 | End: 2021-01-25 | Stop reason: HOSPADM

## 2021-01-01 RX ORDER — NITROGLYCERIN 0.4 MG/1
0.4 TABLET SUBLINGUAL
Status: DISCONTINUED | OUTPATIENT
Start: 2021-01-01 | End: 2021-01-01

## 2021-01-01 RX ORDER — LORAZEPAM 2 MG/ML
0.5 CONCENTRATE ORAL
Status: CANCELLED | OUTPATIENT
Start: 2021-01-01 | End: 2021-01-26

## 2021-01-01 RX ORDER — ACETAMINOPHEN 325 MG/1
650 TABLET ORAL EVERY 4 HOURS PRN
Status: DISCONTINUED | OUTPATIENT
Start: 2021-01-01 | End: 2021-01-01

## 2021-01-01 RX ORDER — ACETAMINOPHEN 650 MG/1
650 SUPPOSITORY RECTAL EVERY 4 HOURS PRN
Status: CANCELLED | OUTPATIENT
Start: 2021-01-01

## 2021-01-01 RX ORDER — LORAZEPAM 2 MG/ML
2 INJECTION INTRAMUSCULAR
Status: CANCELLED | OUTPATIENT
Start: 2021-01-01 | End: 2021-01-25

## 2021-01-01 RX ORDER — KETOROLAC TROMETHAMINE 30 MG/ML
15 INJECTION, SOLUTION INTRAMUSCULAR; INTRAVENOUS EVERY 6 HOURS PRN
Status: DISCONTINUED | OUTPATIENT
Start: 2021-01-01 | End: 2021-01-01 | Stop reason: HOSPADM

## 2021-01-01 RX ORDER — POTASSIUM CHLORIDE 750 MG/1
40 TABLET, FILM COATED, EXTENDED RELEASE ORAL AS NEEDED
Status: CANCELLED | OUTPATIENT
Start: 2021-01-01

## 2021-01-01 RX ORDER — POTASSIUM CHLORIDE 1.5 G/1.77G
40 POWDER, FOR SOLUTION ORAL AS NEEDED
Status: DISCONTINUED | OUTPATIENT
Start: 2021-01-01 | End: 2021-01-01

## 2021-01-01 RX ORDER — HYDROMORPHONE HYDROCHLORIDE 1 MG/ML
0.5 INJECTION, SOLUTION INTRAMUSCULAR; INTRAVENOUS; SUBCUTANEOUS
Status: CANCELLED | OUTPATIENT
Start: 2021-01-01 | End: 2021-01-01

## 2021-01-01 RX ORDER — LORAZEPAM 2 MG/ML
1 CONCENTRATE ORAL
Status: DISCONTINUED | OUTPATIENT
Start: 2021-01-01 | End: 2021-01-01

## 2021-01-01 RX ORDER — POTASSIUM CHLORIDE 7.45 MG/ML
10 INJECTION INTRAVENOUS
Status: CANCELLED | OUTPATIENT
Start: 2021-01-01

## 2021-01-01 RX ORDER — LORAZEPAM 2 MG/ML
0.5 INJECTION INTRAMUSCULAR
Status: CANCELLED | OUTPATIENT
Start: 2021-01-01 | End: 2021-01-26

## 2021-01-01 RX ORDER — LORAZEPAM 2 MG/ML
2 INJECTION INTRAMUSCULAR
Status: DISCONTINUED | OUTPATIENT
Start: 2021-01-01 | End: 2021-01-01 | Stop reason: HOSPADM

## 2021-01-01 RX ORDER — OLANZAPINE 10 MG/1
5 INJECTION, POWDER, LYOPHILIZED, FOR SOLUTION INTRAMUSCULAR ONCE AS NEEDED
Status: DISCONTINUED | OUTPATIENT
Start: 2021-01-01 | End: 2021-01-01 | Stop reason: HOSPADM

## 2021-01-01 RX ORDER — LORAZEPAM 2 MG/ML
2 CONCENTRATE ORAL
Status: CANCELLED | OUTPATIENT
Start: 2021-01-01 | End: 2021-01-26

## 2021-01-01 RX ORDER — POTASSIUM CHLORIDE 1.5 G/1.77G
40 POWDER, FOR SOLUTION ORAL AS NEEDED
Status: CANCELLED | OUTPATIENT
Start: 2021-01-01

## 2021-01-01 RX ORDER — KETOROLAC TROMETHAMINE 30 MG/ML
15 INJECTION, SOLUTION INTRAMUSCULAR; INTRAVENOUS EVERY 6 HOURS PRN
Status: CANCELLED | OUTPATIENT
Start: 2021-01-01 | End: 2021-01-01

## 2021-01-01 RX ORDER — DEXTROSE MONOHYDRATE 50 MG/ML
150 INJECTION, SOLUTION INTRAVENOUS CONTINUOUS
Status: DISCONTINUED | OUTPATIENT
Start: 2021-01-01 | End: 2021-01-01

## 2021-01-01 RX ORDER — LORAZEPAM 2 MG/ML
0.5 CONCENTRATE ORAL
Status: DISCONTINUED | OUTPATIENT
Start: 2021-01-01 | End: 2021-01-01

## 2021-01-01 RX ORDER — SCOLOPAMINE TRANSDERMAL SYSTEM 1 MG/1
1 PATCH, EXTENDED RELEASE TRANSDERMAL
Status: DISCONTINUED | OUTPATIENT
Start: 2021-01-01 | End: 2021-01-25 | Stop reason: HOSPADM

## 2021-01-01 RX ORDER — POTASSIUM CHLORIDE 750 MG/1
40 TABLET, FILM COATED, EXTENDED RELEASE ORAL AS NEEDED
Status: DISCONTINUED | OUTPATIENT
Start: 2021-01-01 | End: 2021-01-01 | Stop reason: HOSPADM

## 2021-01-01 RX ORDER — DIPHENOXYLATE HYDROCHLORIDE AND ATROPINE SULFATE 2.5; .025 MG/1; MG/1
1 TABLET ORAL
Status: CANCELLED | OUTPATIENT
Start: 2021-01-01 | End: 2021-01-26

## 2021-01-01 RX ORDER — DIPHENOXYLATE HYDROCHLORIDE AND ATROPINE SULFATE 2.5; .025 MG/1; MG/1
1 TABLET ORAL
Status: DISCONTINUED | OUTPATIENT
Start: 2021-01-01 | End: 2021-01-01

## 2021-01-01 RX ORDER — ACETAMINOPHEN 650 MG/1
650 SUPPOSITORY RECTAL EVERY 4 HOURS PRN
Status: DISCONTINUED | OUTPATIENT
Start: 2021-01-01 | End: 2021-01-01 | Stop reason: HOSPADM

## 2021-01-01 RX ORDER — MORPHINE SULFATE 4 MG/ML
4 INJECTION, SOLUTION INTRAMUSCULAR; INTRAVENOUS
Status: DISCONTINUED | OUTPATIENT
Start: 2021-01-01 | End: 2021-01-01

## 2021-01-01 RX ORDER — LORAZEPAM 2 MG/ML
2 INJECTION INTRAMUSCULAR
Status: CANCELLED | OUTPATIENT
Start: 2021-01-01 | End: 2021-01-26

## 2021-01-01 RX ORDER — ONDANSETRON 2 MG/ML
4 INJECTION INTRAMUSCULAR; INTRAVENOUS EVERY 6 HOURS PRN
Status: CANCELLED | OUTPATIENT
Start: 2021-01-01

## 2021-01-01 RX ORDER — MORPHINE SULFATE 2 MG/ML
2 INJECTION, SOLUTION INTRAMUSCULAR; INTRAVENOUS
Status: DISCONTINUED | OUTPATIENT
Start: 2021-01-01 | End: 2021-01-01

## 2021-01-01 RX ORDER — SCOLOPAMINE TRANSDERMAL SYSTEM 1 MG/1
1 PATCH, EXTENDED RELEASE TRANSDERMAL
Status: DISCONTINUED | OUTPATIENT
Start: 2021-01-01 | End: 2021-01-01 | Stop reason: HOSPADM

## 2021-01-01 RX ORDER — LORAZEPAM 2 MG/ML
0.5 CONCENTRATE ORAL
Status: DISCONTINUED | OUTPATIENT
Start: 2021-01-01 | End: 2021-01-01 | Stop reason: HOSPADM

## 2021-01-01 RX ORDER — ACETAMINOPHEN 650 MG/1
650 SUPPOSITORY RECTAL EVERY 4 HOURS PRN
Status: DISCONTINUED | OUTPATIENT
Start: 2021-01-01 | End: 2021-01-01

## 2021-01-01 RX ORDER — LORAZEPAM 2 MG/ML
1 INJECTION INTRAMUSCULAR
Status: DISCONTINUED | OUTPATIENT
Start: 2021-01-01 | End: 2021-01-01

## 2021-01-01 RX ORDER — INSULIN LISPRO 100 [IU]/ML
0-7 INJECTION, SOLUTION INTRAVENOUS; SUBCUTANEOUS
Status: DISCONTINUED | OUTPATIENT
Start: 2021-01-01 | End: 2021-01-01

## 2021-01-01 RX ORDER — ONDANSETRON 2 MG/ML
4 INJECTION INTRAMUSCULAR; INTRAVENOUS EVERY 6 HOURS PRN
Status: DISCONTINUED | OUTPATIENT
Start: 2021-01-01 | End: 2021-01-01 | Stop reason: HOSPADM

## 2021-01-01 RX ORDER — POTASSIUM CHLORIDE 7.45 MG/ML
10 INJECTION INTRAVENOUS
Status: DISCONTINUED | OUTPATIENT
Start: 2021-01-01 | End: 2021-01-01

## 2021-01-01 RX ORDER — MORPHINE SULFATE 4 MG/ML
4 INJECTION, SOLUTION INTRAMUSCULAR; INTRAVENOUS
Status: DISCONTINUED | OUTPATIENT
Start: 2021-01-01 | End: 2021-01-01 | Stop reason: HOSPADM

## 2021-01-01 RX ORDER — LORAZEPAM 2 MG/ML
0.5 CONCENTRATE ORAL
Status: CANCELLED | OUTPATIENT
Start: 2021-01-01 | End: 2021-01-25

## 2021-01-01 RX ORDER — SODIUM CHLORIDE 0.9 % (FLUSH) 0.9 %
10 SYRINGE (ML) INJECTION AS NEEDED
Status: CANCELLED | OUTPATIENT
Start: 2021-01-01

## 2021-01-01 RX ORDER — POTASSIUM CHLORIDE 1.5 G/1.77G
40 POWDER, FOR SOLUTION ORAL AS NEEDED
Status: DISCONTINUED | OUTPATIENT
Start: 2021-01-01 | End: 2021-01-01 | Stop reason: HOSPADM

## 2021-01-01 RX ORDER — LORAZEPAM 2 MG/ML
2 INJECTION INTRAMUSCULAR
Status: CANCELLED | OUTPATIENT
Start: 2021-01-01

## 2021-01-01 RX ORDER — DIPHENOXYLATE HYDROCHLORIDE AND ATROPINE SULFATE 2.5; .025 MG/1; MG/1
1 TABLET ORAL
Status: CANCELLED | OUTPATIENT
Start: 2021-01-01 | End: 2021-01-25

## 2021-01-01 RX ORDER — SODIUM CHLORIDE 0.9 % (FLUSH) 0.9 %
10 SYRINGE (ML) INJECTION AS NEEDED
Status: DISCONTINUED | OUTPATIENT
Start: 2021-01-01 | End: 2021-01-25 | Stop reason: HOSPADM

## 2021-01-01 RX ORDER — ACETAMINOPHEN 160 MG/5ML
650 SOLUTION ORAL EVERY 4 HOURS PRN
Status: DISCONTINUED | OUTPATIENT
Start: 2021-01-01 | End: 2021-01-01 | Stop reason: HOSPADM

## 2021-01-01 RX ORDER — OLANZAPINE 10 MG/1
5 INJECTION, POWDER, LYOPHILIZED, FOR SOLUTION INTRAMUSCULAR ONCE AS NEEDED
Status: DISCONTINUED | OUTPATIENT
Start: 2021-01-01 | End: 2021-01-01

## 2021-01-01 RX ORDER — POTASSIUM CHLORIDE 750 MG/1
40 TABLET, FILM COATED, EXTENDED RELEASE ORAL AS NEEDED
Status: DISCONTINUED | OUTPATIENT
Start: 2021-01-01 | End: 2021-01-01

## 2021-01-01 RX ORDER — LORAZEPAM 2 MG/ML
1 INJECTION INTRAMUSCULAR
Status: DISCONTINUED | OUTPATIENT
Start: 2021-01-01 | End: 2021-01-25 | Stop reason: HOSPADM

## 2021-01-01 RX ORDER — HYDROMORPHONE HCL 110MG/55ML
1.5 PATIENT CONTROLLED ANALGESIA SYRINGE INTRAVENOUS
Status: DISCONTINUED | OUTPATIENT
Start: 2021-01-01 | End: 2021-01-25 | Stop reason: HOSPADM

## 2021-01-01 RX ORDER — SODIUM CHLORIDE 0.9 % (FLUSH) 0.9 %
10 SYRINGE (ML) INJECTION EVERY 12 HOURS SCHEDULED
Status: DISCONTINUED | OUTPATIENT
Start: 2021-01-01 | End: 2021-01-01

## 2021-01-01 RX ORDER — DIPHENOXYLATE HYDROCHLORIDE AND ATROPINE SULFATE 2.5; .025 MG/1; MG/1
1 TABLET ORAL
Status: DISCONTINUED | OUTPATIENT
Start: 2021-01-01 | End: 2021-01-01 | Stop reason: HOSPADM

## 2021-01-01 RX ORDER — BISACODYL 10 MG
10 SUPPOSITORY, RECTAL RECTAL ONCE
Status: COMPLETED | OUTPATIENT
Start: 2021-01-01 | End: 2021-01-01

## 2021-01-01 RX ORDER — MORPHINE SULFATE 2 MG/ML
2 INJECTION, SOLUTION INTRAMUSCULAR; INTRAVENOUS
Status: CANCELLED | OUTPATIENT
Start: 2021-01-01 | End: 2021-01-01

## 2021-01-01 RX ORDER — POTASSIUM CHLORIDE 7.45 MG/ML
10 INJECTION INTRAVENOUS
Status: DISCONTINUED | OUTPATIENT
Start: 2021-01-01 | End: 2021-01-01 | Stop reason: HOSPADM

## 2021-01-01 RX ORDER — ACETAMINOPHEN 325 MG/1
650 TABLET ORAL EVERY 4 HOURS PRN
Status: DISCONTINUED | OUTPATIENT
Start: 2021-01-01 | End: 2021-01-25 | Stop reason: HOSPADM

## 2021-01-01 RX ORDER — ACETAMINOPHEN 160 MG/5ML
650 SOLUTION ORAL EVERY 4 HOURS PRN
Status: DISCONTINUED | OUTPATIENT
Start: 2021-01-01 | End: 2021-01-25 | Stop reason: HOSPADM

## 2021-01-01 RX ORDER — LORAZEPAM 2 MG/ML
2 INJECTION INTRAMUSCULAR
Status: DISCONTINUED | OUTPATIENT
Start: 2021-01-01 | End: 2021-01-25 | Stop reason: HOSPADM

## 2021-01-01 RX ORDER — LIDOCAINE HYDROCHLORIDE AND EPINEPHRINE 10; 10 MG/ML; UG/ML
10 INJECTION, SOLUTION INFILTRATION; PERINEURAL ONCE
Status: COMPLETED | OUTPATIENT
Start: 2021-01-01 | End: 2021-01-01

## 2021-01-01 RX ORDER — SODIUM CHLORIDE 450 MG/100ML
250 INJECTION, SOLUTION INTRAVENOUS CONTINUOUS
Status: ACTIVE | OUTPATIENT
Start: 2021-01-01 | End: 2021-01-01

## 2021-01-01 RX ORDER — SODIUM CHLORIDE 0.9 % (FLUSH) 0.9 %
10 SYRINGE (ML) INJECTION AS NEEDED
Status: DISCONTINUED | OUTPATIENT
Start: 2021-01-01 | End: 2021-01-01

## 2021-01-01 RX ORDER — LORAZEPAM 2 MG/ML
1 INJECTION INTRAMUSCULAR
Status: DISCONTINUED | OUTPATIENT
Start: 2021-01-01 | End: 2021-01-01 | Stop reason: HOSPADM

## 2021-01-01 RX ORDER — LORAZEPAM 2 MG/ML
0.5 INJECTION INTRAMUSCULAR
Status: DISCONTINUED | OUTPATIENT
Start: 2021-01-01 | End: 2021-01-25 | Stop reason: HOSPADM

## 2021-01-01 RX ADMIN — HYDROMORPHONE HYDROCHLORIDE 1 MG: 1 INJECTION, SOLUTION INTRAMUSCULAR; INTRAVENOUS; SUBCUTANEOUS at 20:24

## 2021-01-01 RX ADMIN — GLYCOPYRROLATE 0.4 MG: 0.2 INJECTION, SOLUTION INTRAMUSCULAR; INTRAVITREAL at 12:47

## 2021-01-01 RX ADMIN — LORAZEPAM 2 MG: 2 INJECTION INTRAMUSCULAR; INTRAVENOUS at 20:36

## 2021-01-01 RX ADMIN — LORAZEPAM 2 MG: 2 INJECTION INTRAMUSCULAR; INTRAVENOUS at 01:02

## 2021-01-01 RX ADMIN — LORAZEPAM 2 MG: 2 INJECTION INTRAMUSCULAR; INTRAVENOUS at 00:27

## 2021-01-01 RX ADMIN — LORAZEPAM 2 MG: 2 INJECTION INTRAMUSCULAR; INTRAVENOUS at 05:49

## 2021-01-01 RX ADMIN — HYDROMORPHONE HYDROCHLORIDE 1 MG: 1 INJECTION, SOLUTION INTRAMUSCULAR; INTRAVENOUS; SUBCUTANEOUS at 00:42

## 2021-01-01 RX ADMIN — LORAZEPAM 2 MG: 2 INJECTION INTRAMUSCULAR; INTRAVENOUS at 04:40

## 2021-01-01 RX ADMIN — LORAZEPAM 2 MG: 2 INJECTION INTRAMUSCULAR; INTRAVENOUS at 12:47

## 2021-01-01 RX ADMIN — HYDROMORPHONE HYDROCHLORIDE 1 MG: 1 INJECTION, SOLUTION INTRAMUSCULAR; INTRAVENOUS; SUBCUTANEOUS at 08:37

## 2021-01-01 RX ADMIN — LORAZEPAM 2 MG: 2 INJECTION INTRAMUSCULAR; INTRAVENOUS at 12:48

## 2021-01-01 RX ADMIN — LORAZEPAM 2 MG: 2 INJECTION INTRAMUSCULAR; INTRAVENOUS at 16:31

## 2021-01-01 RX ADMIN — LORAZEPAM 2 MG: 2 INJECTION INTRAMUSCULAR; INTRAVENOUS at 17:37

## 2021-01-01 RX ADMIN — HYDROMORPHONE HYDROCHLORIDE 1 MG: 1 INJECTION, SOLUTION INTRAMUSCULAR; INTRAVENOUS; SUBCUTANEOUS at 20:36

## 2021-01-01 RX ADMIN — LORAZEPAM 2 MG: 2 INJECTION INTRAMUSCULAR; INTRAVENOUS at 13:18

## 2021-01-01 RX ADMIN — LORAZEPAM 2 MG: 2 INJECTION INTRAMUSCULAR; INTRAVENOUS at 08:33

## 2021-01-01 RX ADMIN — HYDROMORPHONE HYDROCHLORIDE 1 MG: 1 INJECTION, SOLUTION INTRAMUSCULAR; INTRAVENOUS; SUBCUTANEOUS at 20:37

## 2021-01-01 RX ADMIN — GLYCOPYRROLATE 0.2 MG: 0.2 INJECTION, SOLUTION INTRAMUSCULAR; INTRAVITREAL at 05:10

## 2021-01-01 RX ADMIN — HYDROMORPHONE HYDROCHLORIDE 1 MG: 1 INJECTION, SOLUTION INTRAMUSCULAR; INTRAVENOUS; SUBCUTANEOUS at 04:48

## 2021-01-01 RX ADMIN — GLYCOPYRROLATE 0.2 MG: 0.2 INJECTION, SOLUTION INTRAMUSCULAR; INTRAVITREAL at 16:28

## 2021-01-01 RX ADMIN — GLYCOPYRROLATE 0.4 MG: 0.2 INJECTION, SOLUTION INTRAMUSCULAR; INTRAVITREAL at 16:59

## 2021-01-01 RX ADMIN — GLYCOPYRROLATE 0.4 MG: 0.2 INJECTION, SOLUTION INTRAMUSCULAR; INTRAVITREAL at 20:47

## 2021-01-01 RX ADMIN — LIDOCAINE HYDROCHLORIDE AND EPINEPHRINE 10 ML: 10; 10 INJECTION, SOLUTION INFILTRATION; PERINEURAL at 17:59

## 2021-01-01 RX ADMIN — GLYCOPYRROLATE 0.4 MG: 0.2 INJECTION, SOLUTION INTRAMUSCULAR; INTRAVITREAL at 17:37

## 2021-01-01 RX ADMIN — LORAZEPAM 0.5 MG: 2 INJECTION INTRAMUSCULAR; INTRAVENOUS at 02:46

## 2021-01-01 RX ADMIN — GLYCOPYRROLATE 0.4 MG: 0.2 INJECTION, SOLUTION INTRAMUSCULAR; INTRAVITREAL at 00:28

## 2021-01-01 RX ADMIN — MORPHINE SULFATE 4 MG: 4 INJECTION, SOLUTION INTRAMUSCULAR; INTRAVENOUS at 00:39

## 2021-01-01 RX ADMIN — LORAZEPAM 2 MG: 2 INJECTION INTRAMUSCULAR; INTRAVENOUS at 16:02

## 2021-01-01 RX ADMIN — HYDROMORPHONE HYDROCHLORIDE 1 MG: 1 INJECTION, SOLUTION INTRAMUSCULAR; INTRAVENOUS; SUBCUTANEOUS at 20:47

## 2021-01-01 RX ADMIN — GLYCOPYRROLATE 0.4 MG: 0.2 INJECTION, SOLUTION INTRAMUSCULAR; INTRAVITREAL at 13:15

## 2021-01-01 RX ADMIN — HYDROMORPHONE HYDROCHLORIDE 1 MG: 1 INJECTION, SOLUTION INTRAMUSCULAR; INTRAVENOUS; SUBCUTANEOUS at 13:15

## 2021-01-01 RX ADMIN — HYDROMORPHONE HYDROCHLORIDE 1 MG: 1 INJECTION, SOLUTION INTRAMUSCULAR; INTRAVENOUS; SUBCUTANEOUS at 17:42

## 2021-01-01 RX ADMIN — GLYCOPYRROLATE 0.4 MG: 0.2 INJECTION, SOLUTION INTRAMUSCULAR; INTRAVITREAL at 04:45

## 2021-01-01 RX ADMIN — LORAZEPAM 1 MG: 2 INJECTION INTRAMUSCULAR; INTRAVENOUS at 12:20

## 2021-01-01 RX ADMIN — GLYCOPYRROLATE 0.4 MG: 0.2 INJECTION, SOLUTION INTRAMUSCULAR; INTRAVITREAL at 04:26

## 2021-01-01 RX ADMIN — MORPHINE SULFATE 4 MG: 4 INJECTION, SOLUTION INTRAMUSCULAR; INTRAVENOUS at 12:21

## 2021-01-01 RX ADMIN — GLYCOPYRROLATE 0.4 MG: 0.2 INJECTION, SOLUTION INTRAMUSCULAR; INTRAVITREAL at 20:36

## 2021-01-01 RX ADMIN — GLYCOPYRROLATE 0.4 MG: 0.2 INJECTION, SOLUTION INTRAMUSCULAR; INTRAVITREAL at 04:43

## 2021-01-01 RX ADMIN — GLYCOPYRROLATE 0.4 MG: 0.2 INJECTION, SOLUTION INTRAMUSCULAR; INTRAVITREAL at 20:37

## 2021-01-01 RX ADMIN — MORPHINE SULFATE 4 MG: 4 INJECTION, SOLUTION INTRAMUSCULAR; INTRAVENOUS at 10:17

## 2021-01-01 RX ADMIN — LORAZEPAM 2 MG: 2 INJECTION INTRAMUSCULAR; INTRAVENOUS at 12:55

## 2021-01-01 RX ADMIN — SODIUM CHLORIDE 125 ML/HR: 4.5 INJECTION, SOLUTION INTRAVENOUS at 12:10

## 2021-01-01 RX ADMIN — HYDROMORPHONE HYDROCHLORIDE 1 MG: 1 INJECTION, SOLUTION INTRAMUSCULAR; INTRAVENOUS; SUBCUTANEOUS at 16:31

## 2021-01-01 RX ADMIN — GLYCOPYRROLATE 0.4 MG: 0.2 INJECTION, SOLUTION INTRAMUSCULAR; INTRAVITREAL at 13:18

## 2021-01-01 RX ADMIN — LORAZEPAM 2 MG: 2 INJECTION INTRAMUSCULAR; INTRAVENOUS at 08:16

## 2021-01-01 RX ADMIN — BISACODYL 10 MG: 10 SUPPOSITORY RECTAL at 15:24

## 2021-01-01 RX ADMIN — POTASSIUM CHLORIDE 10 MEQ: 7.46 INJECTION, SOLUTION INTRAVENOUS at 03:09

## 2021-01-01 RX ADMIN — GLYCOPYRROLATE 0.2 MG: 0.2 INJECTION, SOLUTION INTRAMUSCULAR; INTRAVITREAL at 21:17

## 2021-01-01 RX ADMIN — POTASSIUM CHLORIDE 10 MEQ: 7.46 INJECTION, SOLUTION INTRAVENOUS at 08:38

## 2021-01-01 RX ADMIN — HYDROMORPHONE HYDROCHLORIDE 1 MG: 1 INJECTION, SOLUTION INTRAMUSCULAR; INTRAVENOUS; SUBCUTANEOUS at 00:28

## 2021-01-01 RX ADMIN — LORAZEPAM 2 MG: 2 INJECTION INTRAMUSCULAR; INTRAVENOUS at 16:38

## 2021-01-01 RX ADMIN — HYDROMORPHONE HYDROCHLORIDE 1 MG: 1 INJECTION, SOLUTION INTRAMUSCULAR; INTRAVENOUS; SUBCUTANEOUS at 12:48

## 2021-01-01 RX ADMIN — LORAZEPAM 2 MG: 2 INJECTION INTRAMUSCULAR; INTRAVENOUS at 20:29

## 2021-01-01 RX ADMIN — MORPHINE SULFATE 2 MG: 2 INJECTION, SOLUTION INTRAMUSCULAR; INTRAVENOUS at 08:22

## 2021-01-01 RX ADMIN — DEXTROSE MONOHYDRATE 125 ML/HR: 50 INJECTION, SOLUTION INTRAVENOUS at 16:39

## 2021-01-01 RX ADMIN — GLYCOPYRROLATE 0.2 MG: 0.2 INJECTION, SOLUTION INTRAMUSCULAR; INTRAVITREAL at 10:17

## 2021-01-01 RX ADMIN — TAZOBACTAM SODIUM AND PIPERACILLIN SODIUM 3.38 G: 375; 3 INJECTION, SOLUTION INTRAVENOUS at 08:37

## 2021-01-01 RX ADMIN — LORAZEPAM 2 MG: 2 INJECTION INTRAMUSCULAR; INTRAVENOUS at 16:58

## 2021-01-01 RX ADMIN — LORAZEPAM 0.5 MG: 2 INJECTION INTRAMUSCULAR; INTRAVENOUS at 08:22

## 2021-01-01 RX ADMIN — MORPHINE SULFATE 4 MG: 4 INJECTION, SOLUTION INTRAMUSCULAR; INTRAVENOUS at 21:16

## 2021-01-01 RX ADMIN — LORAZEPAM 2 MG: 2 INJECTION INTRAMUSCULAR; INTRAVENOUS at 04:44

## 2021-01-01 RX ADMIN — LORAZEPAM 2 MG: 2 INJECTION INTRAMUSCULAR; INTRAVENOUS at 17:42

## 2021-01-01 RX ADMIN — GLYCOPYRROLATE 0.4 MG: 0.2 INJECTION, SOLUTION INTRAMUSCULAR; INTRAVITREAL at 08:16

## 2021-01-01 RX ADMIN — TAZOBACTAM SODIUM AND PIPERACILLIN SODIUM 3.38 G: 375; 3 INJECTION, SOLUTION INTRAVENOUS at 02:08

## 2021-01-01 RX ADMIN — INSULIN LISPRO 2 UNITS: 100 INJECTION, SOLUTION INTRAVENOUS; SUBCUTANEOUS at 17:05

## 2021-01-01 RX ADMIN — GLYCOPYRROLATE 0.2 MG: 0.2 INJECTION, SOLUTION INTRAMUSCULAR; INTRAVITREAL at 00:40

## 2021-01-01 RX ADMIN — LORAZEPAM 2 MG: 2 INJECTION INTRAMUSCULAR; INTRAVENOUS at 00:14

## 2021-01-01 RX ADMIN — HYDROMORPHONE HYDROCHLORIDE 1 MG: 1 INJECTION, SOLUTION INTRAMUSCULAR; INTRAVENOUS; SUBCUTANEOUS at 12:47

## 2021-01-01 RX ADMIN — LORAZEPAM 1 MG: 2 INJECTION INTRAMUSCULAR; INTRAVENOUS at 21:17

## 2021-01-01 RX ADMIN — MORPHINE SULFATE 4 MG: 4 INJECTION, SOLUTION INTRAMUSCULAR; INTRAVENOUS at 08:16

## 2021-01-01 RX ADMIN — HYDROMORPHONE HYDROCHLORIDE 1 MG: 1 INJECTION, SOLUTION INTRAMUSCULAR; INTRAVENOUS; SUBCUTANEOUS at 04:26

## 2021-01-01 RX ADMIN — GLYCOPYRROLATE 0.4 MG: 0.2 INJECTION, SOLUTION INTRAMUSCULAR; INTRAVITREAL at 08:57

## 2021-01-01 RX ADMIN — HYDROMORPHONE HYDROCHLORIDE 1 MG: 1 INJECTION, SOLUTION INTRAMUSCULAR; INTRAVENOUS; SUBCUTANEOUS at 12:25

## 2021-01-01 RX ADMIN — MORPHINE SULFATE 4 MG: 4 INJECTION, SOLUTION INTRAMUSCULAR; INTRAVENOUS at 20:38

## 2021-01-01 RX ADMIN — LORAZEPAM 2 MG: 2 INJECTION INTRAMUSCULAR; INTRAVENOUS at 16:59

## 2021-01-01 RX ADMIN — GLYCOPYRROLATE 0.4 MG: 0.2 INJECTION, SOLUTION INTRAMUSCULAR; INTRAVITREAL at 04:40

## 2021-01-01 RX ADMIN — LORAZEPAM 0.5 MG: 2 INJECTION INTRAMUSCULAR; INTRAVENOUS at 04:27

## 2021-01-01 RX ADMIN — HYDROMORPHONE HYDROCHLORIDE 1 MG: 1 INJECTION, SOLUTION INTRAMUSCULAR; INTRAVENOUS; SUBCUTANEOUS at 20:41

## 2021-01-01 RX ADMIN — GLYCOPYRROLATE 0.2 MG: 0.2 INJECTION, SOLUTION INTRAMUSCULAR; INTRAVITREAL at 00:54

## 2021-01-01 RX ADMIN — HYDROMORPHONE HYDROCHLORIDE 1 MG: 1 INJECTION, SOLUTION INTRAMUSCULAR; INTRAVENOUS; SUBCUTANEOUS at 00:26

## 2021-01-01 RX ADMIN — INSULIN LISPRO 2 UNITS: 100 INJECTION, SOLUTION INTRAVENOUS; SUBCUTANEOUS at 12:01

## 2021-01-01 RX ADMIN — LORAZEPAM 2 MG: 2 INJECTION INTRAMUSCULAR; INTRAVENOUS at 20:37

## 2021-01-01 RX ADMIN — SODIUM CHLORIDE 125 ML/HR: 4.5 INJECTION, SOLUTION INTRAVENOUS at 02:07

## 2021-01-01 RX ADMIN — TAZOBACTAM SODIUM AND PIPERACILLIN SODIUM 3.38 G: 375; 3 INJECTION, SOLUTION INTRAVENOUS at 12:11

## 2021-01-01 RX ADMIN — LORAZEPAM 2 MG: 2 INJECTION INTRAMUSCULAR; INTRAVENOUS at 04:31

## 2021-01-01 RX ADMIN — HYDROMORPHONE HYDROCHLORIDE 1 MG: 1 INJECTION, SOLUTION INTRAMUSCULAR; INTRAVENOUS; SUBCUTANEOUS at 17:37

## 2021-01-01 RX ADMIN — TAZOBACTAM SODIUM AND PIPERACILLIN SODIUM 3.38 G: 375; 3 INJECTION, SOLUTION INTRAVENOUS at 11:11

## 2021-01-01 RX ADMIN — HYDROMORPHONE HYDROCHLORIDE 1 MG: 1 INJECTION, SOLUTION INTRAMUSCULAR; INTRAVENOUS; SUBCUTANEOUS at 05:49

## 2021-01-01 RX ADMIN — GLYCOPYRROLATE 0.4 MG: 0.2 INJECTION, SOLUTION INTRAMUSCULAR; INTRAVITREAL at 07:40

## 2021-01-01 RX ADMIN — MORPHINE SULFATE 4 MG: 4 INJECTION, SOLUTION INTRAMUSCULAR; INTRAVENOUS at 12:56

## 2021-01-01 RX ADMIN — LORAZEPAM 2 MG: 2 INJECTION INTRAMUSCULAR; INTRAVENOUS at 00:28

## 2021-01-01 RX ADMIN — GLYCOPYRROLATE 0.4 MG: 0.2 INJECTION, SOLUTION INTRAMUSCULAR; INTRAVITREAL at 16:31

## 2021-01-01 RX ADMIN — DEXTROSE MONOHYDRATE 125 ML/HR: 50 INJECTION, SOLUTION INTRAVENOUS at 08:36

## 2021-01-01 RX ADMIN — SODIUM CHLORIDE 1000 ML: 9 INJECTION, SOLUTION INTRAVENOUS at 11:37

## 2021-01-01 RX ADMIN — GLYCOPYRROLATE 0.4 MG: 0.2 INJECTION, SOLUTION INTRAMUSCULAR; INTRAVITREAL at 00:14

## 2021-01-01 RX ADMIN — HYDROMORPHONE HYDROCHLORIDE 1 MG: 1 INJECTION, SOLUTION INTRAMUSCULAR; INTRAVENOUS; SUBCUTANEOUS at 13:18

## 2021-01-01 RX ADMIN — HYDROMORPHONE HYDROCHLORIDE 1 MG: 1 INJECTION, SOLUTION INTRAMUSCULAR; INTRAVENOUS; SUBCUTANEOUS at 08:56

## 2021-01-01 RX ADMIN — LORAZEPAM 2 MG: 2 INJECTION INTRAMUSCULAR; INTRAVENOUS at 08:40

## 2021-01-01 RX ADMIN — HYDROMORPHONE HYDROCHLORIDE 1 MG: 1 INJECTION, SOLUTION INTRAMUSCULAR; INTRAVENOUS; SUBCUTANEOUS at 20:35

## 2021-01-01 RX ADMIN — GLYCOPYRROLATE 0.4 MG: 0.2 INJECTION, SOLUTION INTRAMUSCULAR; INTRAVITREAL at 05:49

## 2021-01-01 RX ADMIN — GLYCOPYRROLATE 0.4 MG: 0.2 INJECTION, SOLUTION INTRAMUSCULAR; INTRAVITREAL at 08:46

## 2021-01-01 RX ADMIN — HYDROMORPHONE HYDROCHLORIDE 1 MG: 1 INJECTION, SOLUTION INTRAMUSCULAR; INTRAVENOUS; SUBCUTANEOUS at 08:47

## 2021-01-01 RX ADMIN — LORAZEPAM 2 MG: 2 INJECTION INTRAMUSCULAR; INTRAVENOUS at 08:44

## 2021-01-01 RX ADMIN — HYDROMORPHONE HYDROCHLORIDE 1 MG: 1 INJECTION, SOLUTION INTRAMUSCULAR; INTRAVENOUS; SUBCUTANEOUS at 13:16

## 2021-01-01 RX ADMIN — HYDROMORPHONE HYDROCHLORIDE 1 MG: 1 INJECTION, SOLUTION INTRAMUSCULAR; INTRAVENOUS; SUBCUTANEOUS at 04:32

## 2021-01-01 RX ADMIN — INSULIN LISPRO 2 UNITS: 100 INJECTION, SOLUTION INTRAVENOUS; SUBCUTANEOUS at 07:50

## 2021-01-01 RX ADMIN — HYDROMORPHONE HYDROCHLORIDE 1 MG: 1 INJECTION, SOLUTION INTRAMUSCULAR; INTRAVENOUS; SUBCUTANEOUS at 00:14

## 2021-01-01 RX ADMIN — HYDROMORPHONE HYDROCHLORIDE 1 MG: 1 INJECTION, SOLUTION INTRAMUSCULAR; INTRAVENOUS; SUBCUTANEOUS at 08:44

## 2021-01-01 RX ADMIN — LORAZEPAM 2 MG: 2 INJECTION INTRAMUSCULAR; INTRAVENOUS at 00:26

## 2021-01-01 RX ADMIN — SODIUM CHLORIDE 1000 ML: 9 INJECTION, SOLUTION INTRAVENOUS at 10:01

## 2021-01-01 RX ADMIN — LORAZEPAM 2 MG: 2 INJECTION INTRAMUSCULAR; INTRAVENOUS at 04:43

## 2021-01-01 RX ADMIN — HYDROMORPHONE HYDROCHLORIDE 1 MG: 1 INJECTION, SOLUTION INTRAMUSCULAR; INTRAVENOUS; SUBCUTANEOUS at 08:40

## 2021-01-01 RX ADMIN — LORAZEPAM 2 MG: 2 INJECTION INTRAMUSCULAR; INTRAVENOUS at 20:47

## 2021-01-01 RX ADMIN — GLYCOPYRROLATE 0.4 MG: 0.2 INJECTION, SOLUTION INTRAMUSCULAR; INTRAVITREAL at 00:27

## 2021-01-01 RX ADMIN — LORAZEPAM 1 MG: 2 INJECTION INTRAMUSCULAR; INTRAVENOUS at 16:28

## 2021-01-01 RX ADMIN — GLYCOPYRROLATE 0.4 MG: 0.2 INJECTION, SOLUTION INTRAMUSCULAR; INTRAVITREAL at 00:42

## 2021-01-01 RX ADMIN — LORAZEPAM 2 MG: 2 INJECTION INTRAMUSCULAR; INTRAVENOUS at 08:37

## 2021-01-01 RX ADMIN — SODIUM CHLORIDE 125 ML/HR: 4.5 INJECTION, SOLUTION INTRAVENOUS at 17:48

## 2021-01-01 RX ADMIN — LORAZEPAM 2 MG: 2 INJECTION INTRAMUSCULAR; INTRAVENOUS at 04:26

## 2021-01-01 RX ADMIN — SODIUM CHLORIDE, PRESERVATIVE FREE 10 ML: 5 INJECTION INTRAVENOUS at 20:09

## 2021-01-01 RX ADMIN — HYDROMORPHONE HYDROCHLORIDE 1 MG: 1 INJECTION, SOLUTION INTRAMUSCULAR; INTRAVENOUS; SUBCUTANEOUS at 07:40

## 2021-01-01 RX ADMIN — HYDROMORPHONE HYDROCHLORIDE 1 MG: 1 INJECTION, SOLUTION INTRAMUSCULAR; INTRAVENOUS; SUBCUTANEOUS at 08:33

## 2021-01-01 RX ADMIN — TAZOBACTAM SODIUM AND PIPERACILLIN SODIUM 3.38 G: 375; 3 INJECTION, SOLUTION INTRAVENOUS at 02:15

## 2021-01-01 RX ADMIN — POTASSIUM CHLORIDE 10 MEQ: 7.46 INJECTION, SOLUTION INTRAVENOUS at 07:35

## 2021-01-01 RX ADMIN — POTASSIUM CHLORIDE 10 MEQ: 7.46 INJECTION, SOLUTION INTRAVENOUS at 02:09

## 2021-01-01 RX ADMIN — MORPHINE SULFATE 2 MG: 2 INJECTION, SOLUTION INTRAMUSCULAR; INTRAVENOUS at 04:27

## 2021-01-01 RX ADMIN — HYDROMORPHONE HYDROCHLORIDE 1 MG: 1 INJECTION, SOLUTION INTRAMUSCULAR; INTRAVENOUS; SUBCUTANEOUS at 00:54

## 2021-01-01 RX ADMIN — MORPHINE SULFATE 4 MG: 4 INJECTION, SOLUTION INTRAMUSCULAR; INTRAVENOUS at 16:28

## 2021-01-01 RX ADMIN — HYDROMORPHONE HYDROCHLORIDE 1 MG: 1 INJECTION, SOLUTION INTRAMUSCULAR; INTRAVENOUS; SUBCUTANEOUS at 04:43

## 2021-01-01 RX ADMIN — HYDROMORPHONE HYDROCHLORIDE 1 MG: 1 INJECTION, SOLUTION INTRAMUSCULAR; INTRAVENOUS; SUBCUTANEOUS at 20:29

## 2021-01-01 RX ADMIN — LORAZEPAM 2 MG: 2 INJECTION INTRAMUSCULAR; INTRAVENOUS at 13:15

## 2021-01-01 RX ADMIN — HYDROMORPHONE HYDROCHLORIDE 1 MG: 1 INJECTION, SOLUTION INTRAMUSCULAR; INTRAVENOUS; SUBCUTANEOUS at 04:42

## 2021-01-01 RX ADMIN — HYDROMORPHONE HYDROCHLORIDE 1 MG: 1 INJECTION, SOLUTION INTRAMUSCULAR; INTRAVENOUS; SUBCUTANEOUS at 16:02

## 2021-01-01 RX ADMIN — GLYCOPYRROLATE 0.4 MG: 0.2 INJECTION, SOLUTION INTRAMUSCULAR; INTRAVITREAL at 00:26

## 2021-01-01 RX ADMIN — GLYCOPYRROLATE 0.4 MG: 0.2 INJECTION, SOLUTION INTRAMUSCULAR; INTRAVITREAL at 12:48

## 2021-01-01 RX ADMIN — LORAZEPAM 2 MG: 2 INJECTION INTRAMUSCULAR; INTRAVENOUS at 20:35

## 2021-01-01 RX ADMIN — TAZOBACTAM SODIUM AND PIPERACILLIN SODIUM 3.38 G: 375; 3 INJECTION, SOLUTION INTRAVENOUS at 17:48

## 2021-01-01 RX ADMIN — MORPHINE SULFATE 4 MG: 4 INJECTION, SOLUTION INTRAMUSCULAR; INTRAVENOUS at 16:59

## 2021-01-01 RX ADMIN — LORAZEPAM 1 MG: 2 INJECTION INTRAMUSCULAR; INTRAVENOUS at 10:17

## 2021-01-01 RX ADMIN — MORPHINE SULFATE 4 MG: 4 INJECTION, SOLUTION INTRAMUSCULAR; INTRAVENOUS at 05:11

## 2021-01-01 RX ADMIN — LORAZEPAM 2 MG: 2 INJECTION INTRAMUSCULAR; INTRAVENOUS at 12:25

## 2021-01-01 RX ADMIN — TAZOBACTAM SODIUM AND PIPERACILLIN SODIUM 3.38 G: 375; 3 INJECTION, SOLUTION INTRAVENOUS at 17:05

## 2021-01-01 RX ADMIN — GLYCOPYRROLATE 0.4 MG: 0.2 INJECTION, SOLUTION INTRAMUSCULAR; INTRAVITREAL at 20:35

## 2021-01-01 RX ADMIN — LORAZEPAM 2 MG: 2 INJECTION INTRAMUSCULAR; INTRAVENOUS at 00:54

## 2021-01-01 RX ADMIN — SODIUM CHLORIDE, PRESERVATIVE FREE 10 ML: 5 INJECTION INTRAVENOUS at 11:11

## 2021-01-01 RX ADMIN — GLYCERIN, HYPROMELLOSE, POLYETHYLENE GLYCOL 1 DROP: .2; .2; 1 LIQUID OPHTHALMIC at 14:56

## 2021-01-01 RX ADMIN — HYDROMORPHONE HYDROCHLORIDE 1 MG: 1 INJECTION, SOLUTION INTRAMUSCULAR; INTRAVENOUS; SUBCUTANEOUS at 00:27

## 2021-01-01 RX ADMIN — LORAZEPAM 2 MG: 2 INJECTION INTRAMUSCULAR; INTRAVENOUS at 08:47

## 2021-01-01 RX ADMIN — LORAZEPAM 2 MG: 2 INJECTION INTRAMUSCULAR; INTRAVENOUS at 07:40

## 2021-01-01 RX ADMIN — HYDROMORPHONE HYDROCHLORIDE 1 MG: 1 INJECTION, SOLUTION INTRAMUSCULAR; INTRAVENOUS; SUBCUTANEOUS at 01:02

## 2021-01-01 RX ADMIN — INSULIN LISPRO 2 UNITS: 100 INJECTION, SOLUTION INTRAVENOUS; SUBCUTANEOUS at 12:21

## 2021-01-01 RX ADMIN — MORPHINE SULFATE 2 MG: 2 INJECTION, SOLUTION INTRAMUSCULAR; INTRAVENOUS at 02:46

## 2021-01-01 RX ADMIN — LORAZEPAM 2 MG: 2 INJECTION INTRAMUSCULAR; INTRAVENOUS at 08:57

## 2021-01-01 RX ADMIN — HYDROMORPHONE HYDROCHLORIDE 1 MG: 1 INJECTION, SOLUTION INTRAMUSCULAR; INTRAVENOUS; SUBCUTANEOUS at 16:38

## 2021-01-01 RX ADMIN — GLYCOPYRROLATE 0.4 MG: 0.2 INJECTION, SOLUTION INTRAMUSCULAR; INTRAVITREAL at 20:29

## 2021-01-01 RX ADMIN — DEXTROSE MONOHYDRATE 125 ML/HR: 50 INJECTION, SOLUTION INTRAVENOUS at 00:38

## 2021-01-01 RX ADMIN — LORAZEPAM 2 MG: 2 INJECTION INTRAMUSCULAR; INTRAVENOUS at 00:39

## 2021-01-01 RX ADMIN — LORAZEPAM 2 MG: 2 INJECTION INTRAMUSCULAR; INTRAVENOUS at 13:16

## 2021-01-01 RX ADMIN — HYDROMORPHONE HYDROCHLORIDE 1 MG: 1 INJECTION, SOLUTION INTRAMUSCULAR; INTRAVENOUS; SUBCUTANEOUS at 04:40

## 2021-01-01 RX ADMIN — GLYCOPYRROLATE 0.4 MG: 0.2 INJECTION, SOLUTION INTRAMUSCULAR; INTRAVITREAL at 16:58

## 2021-01-01 RX ADMIN — GLYCOPYRROLATE 0.4 MG: 0.2 INJECTION, SOLUTION INTRAMUSCULAR; INTRAVITREAL at 12:56

## 2021-01-01 RX ADMIN — LORAZEPAM 2 MG: 2 INJECTION INTRAMUSCULAR; INTRAVENOUS at 20:24

## 2021-01-01 RX ADMIN — LORAZEPAM 2 MG: 2 INJECTION INTRAMUSCULAR; INTRAVENOUS at 04:48

## 2021-01-01 RX ADMIN — LORAZEPAM 2 MG: 2 INJECTION INTRAMUSCULAR; INTRAVENOUS at 00:42

## 2021-01-01 RX ADMIN — GLYCOPYRROLATE 0.4 MG: 0.2 INJECTION, SOLUTION INTRAMUSCULAR; INTRAVITREAL at 17:42

## 2021-01-01 RX ADMIN — HYDROMORPHONE HYDROCHLORIDE 1 MG: 1 INJECTION, SOLUTION INTRAMUSCULAR; INTRAVENOUS; SUBCUTANEOUS at 16:58

## 2021-01-01 RX ADMIN — LORAZEPAM 2 MG: 2 INJECTION INTRAMUSCULAR; INTRAVENOUS at 20:41

## 2021-01-01 RX ADMIN — LORAZEPAM 2 MG: 2 INJECTION INTRAMUSCULAR; INTRAVENOUS at 05:11

## 2021-01-01 RX ADMIN — VANCOMYCIN HYDROCHLORIDE 1500 MG: 10 INJECTION, POWDER, LYOPHILIZED, FOR SOLUTION INTRAVENOUS at 12:49

## 2021-01-06 NOTE — ED PROVIDER NOTES
Pt presents to the ED c/o  left eyebrow laceration from unwitnessed fall at the nursing home prior to arrival.  Reportedly, patient is at baseline.  Patient cannot give history secondary to underlying confusion.  Denies headache, nausea or vomiting.     On exam,   His heart is regular rate and rhythm without murmur.  His lungs are clear to auscultation bilaterally.  His C-spine is nontender to palpation.  He has an approximate 1 cm laceration through his left eyebrow.  His extraocular movements are intact.  His left shoulder is nontender to palpation.  His abdomen is normoactive bowel sounds, soft, nontender nondistended.  He is moving all 4 extremities.     Plan: Agree with plan of laceration repair and CT of the head.      Patient was placed in face mask in first look. Patient was wearing facemask when I entered the room and throughout our encounter. I wore full protective equipment throughout this patient encounter including a face mask, eye shield and gloves. Hand hygiene was performed before donning protective equipment and after removal when leaving the room.       Attestation:  The ANJANA and I have discussed this patient's history, physical exam, and treatment plan.  I have reviewed the documentation and personally had a face to face interaction with the patient. I affirm the documentation and agree with the treatment and plan.  The attached note describes my personal findings.            Angelo Naidu MD  01/06/21 1950

## 2021-01-06 NOTE — ED PROVIDER NOTES
EMERGENCY DEPARTMENT ENCOUNTER    Room Number:  43/43  Date seen:  1/6/2021  Time seen: 17:14 EST  PCP: Chris Dahl MD  Historian: EMS, NH    HPI:  Chief complaint:unwitnessed fall at NH  A complete HPI/ROS/PMH/PSH/SH/FH are unobtainable due to: dementia  Context:Anthony Hernandez is a 80 y.o. male who presents to the ED with c/o left eyebrow laceration from an unwitnessed fall at his NH about 2 hours ago.  Per NH he is at his mental baseline.  He denies complaints to me. History is limited due to dementia.  He is not on blood thinners.     Patient was placed in face mask in first look. Patient was wearing facemask when I entered the room and throughout our encounter. I wore full protective equipment throughout this patient encounter including a face mask, eye shield and gloves. Hand hygiene/washing of hands was performed before donning protective equipment and after removal when leaving the room.    MEDICAL RECORD REVIEW    ALLERGIES  Ativan [lorazepam], Benzodiazepines, Diazepam, and Farxiga [dapagliflozin]    PAST MEDICAL HISTORY  Active Ambulatory Problems     Diagnosis Date Noted   • Aortic heart valve narrowing 02/15/2016   • Asymptomatic carotid artery narrowing without infarction 02/15/2016   • Cor pulmonale (CMS/HCC) 02/15/2016   • Functional murmur 02/15/2016   • BP (high blood pressure) 02/15/2016   • Lumbar radiculopathy 02/15/2016   • CN (constipation) 02/15/2016   • DDD (degenerative disc disease), lumbar 02/15/2016   • Fatigue 02/15/2016   • HLD (hyperlipidemia) 02/15/2016   • Amnesia 02/15/2016   • ZAIN (obstructive sleep apnea) 02/15/2016   • Loud breathing during sleep 02/15/2016   • Diabetes mellitus type 2, noninsulin dependent (CMS/HCC) 02/15/2016   • Multiple thyroid nodules 03/30/2017   • Spinal stenosis of lumbar region with neurogenic claudication 06/27/2017   • Difficulty walking 06/28/2017   • Hearing loss of both ears 09/14/2017   • Episodic confusion 11/06/2017   • Altered mental status  12/15/2017   • Late onset Alzheimer's disease with behavioral disturbance (CMS/HCC) 01/05/2018   • Dementia without behavioral disturbance (CMS/HCC) 05/09/2018   • Hypothyroidism 05/09/2018   • Acute intractable headache 06/14/2018   • Herniation of lumbar intervertebral disc with radiculopathy 01/14/2019   • Halitosis 03/05/2019   • Traumatic fracture of ribs with pneumothorax 09/22/2019   • Vertebral compression fracture (CMS/Former[facility])- L1 09/22/2019   • Acute respiratory failure with hypoxia (CMS/[facility]) 09/22/2019     Resolved Ambulatory Problems     Diagnosis Date Noted   • Back ache 02/15/2016   • Bulge of lumbar disc without myelopathy 02/15/2016   • Leg pain 02/15/2016   • Ache in joint 02/15/2016   • LBP (low back pain) 02/15/2016   • Lumbar canal stenosis 02/15/2016   • Surgery follow-up examination 07/11/2017     Past Medical History:   Diagnosis Date   • Acute myocardial infarction (CMS/[facility])    • Anesthesia complication    • Aortic valve sclerosis    • Coronary artery disease    • Diabetes mellitus (CMS/[facility])    • Disease of thyroid gland    • Erectile dysfunction    • Fall    • Hearing difficulty of both ears    • Heart attack (CMS/[facility])    • Heart attack (CMS/[facility])    • Heart murmur    • Hyperlipidemia    • Intermittent claudication (CMS/[facility])    • Osteoarthritis    • Peripheral neuropathy    • Peripheral vascular disease (CMS/[facility])    • Skin cancer    • Sleep apnea    • Spinal stenosis        PAST SURGICAL HISTORY  Past Surgical History:   Procedure Laterality Date   • ATHERECTOMY Left     BRANT FEMORAL-POPLITEAL INITIAL STENOSIS WITH ATHERECTOMY AND STENT LEFT   • CATARACT EXTRACTION, BILATERAL     • COLONOSCOPY     • COLONOSCOPY W/ POLYPECTOMY      BENIGN   • CORONARY ANGIOPLASTY WITH STENT PLACEMENT     • CORONARY ARTERY BYPASS GRAFT  04/2010    cabg x 3: LIMA to LAD, vein graft to RCA and OM1   • EPIDURAL  01/2019   • LUMBAR LAMINECTOMY DISCECTOMY DECOMPRESSION Bilateral 6/27/2017    Procedure: L3/4, L4/5 OPEN  LUMBAR DECOMPRESSION POSTERIOR 1-2 LEVELS;  Surgeon: Ranjeet Contreras MD;  Location: [facility] OR;  Service:    • SPINE SURGERY     • THROMBOENDARTERECTOMY Left     NEUROLOGICAL SURGERY CAROTID ENDARTERECTOMY LEFT       FAMILY HISTORY  Family History   Problem Relation Age of Onset   • Breast cancer Mother    • Cancer Mother    • Brain cancer Sister    • Other Brother         CABG   • Stroke Brother    • Heart disease Brother    • Heart attack Brother    • Malig Hyperthermia Neg Hx        SOCIAL HISTORY  Social History     Socioeconomic History   • Marital status:      Spouse name: Not on file   • Number of children: Not on file   • Years of education: Not on file   • Highest education level: Not on file   Tobacco Use   • Smoking status: Former Smoker     Packs/day: 1.00     Years: 20.00     Pack years: 20.00     Quit date:      Years since quittin.0   • Smokeless tobacco: Never Used   Substance and Sexual Activity   • Alcohol use: No   • Drug use: No   • Sexual activity: Never       REVIEW OF SYSTEMS  Review of Systems   Unable to perform ROS: Dementia     PHYSICAL EXAM    ED Triage Vitals [21 1529]   Temp Heart Rate Resp BP SpO2   97.8 °F (36.6 °C) 80 18 164/70 99 %      Temp src Heart Rate Source Patient Position BP Location FiO2 (%)   -- -- -- -- --     Physical Exam   Eyes:           I have reviewed the triage vital signs and nursing notes.      GENERAL: not distressed, elderly, frail, cooperative  HENT: nares patent, mm moist, see diagram  EYES: no scleral icterus, PERRL, EOMI  NECK: no ROM limitations  CV: regular rhythm, regular rate, murmur, no rubs, no gallups  RESPIRATORY: normal effort, CTAB  ABDOMEN: soft  : deferred  MUSCULOSKELETAL: no deformity  NEURO: alert, moves all extremities, follows commands  SKIN: warm, dry    Laceration Repair    Date/Time: 2021 7:22 PM  Performed by: Judy Montez APRN  Authorized by: Angelo Naidu MD     Consent:     Consent obtained:   Verbal    Consent given by:  Patient    Risks discussed:  Infection, pain and poor cosmetic result    Alternatives discussed:  No treatment  Anesthesia (see MAR for exact dosages):     Anesthesia method:  Local infiltration    Local anesthetic:  Lidocaine 1% WITH epi  Laceration details:     Location:  Face    Face location:  L eyebrow    Length (cm):  1.5  Repair type:     Repair type:  Simple  Pre-procedure details:     Preparation:  Patient was prepped and draped in usual sterile fashion and imaging obtained to evaluate for foreign bodies  Exploration:     Wound exploration: wound explored through full range of motion and entire depth of wound probed and visualized      Wound extent: no foreign bodies/material noted, no nerve damage noted, no tendon damage noted, no underlying fracture noted and no vascular damage noted      Contaminated: no    Treatment:     Area cleansed with:  Curtis    Amount of cleaning:  Extensive    Irrigation solution:  Sterile saline    Irrigation volume:  50    Irrigation method:  Pressure wash    Visualized foreign bodies/material removed: no    Skin repair:     Repair method:  Sutures    Suture size:  5-0    Suture material:  Plain gut    Suture technique:  Running  Approximation:     Approximation:  Close  Post-procedure details:     Dressing:  Open (no dressing)    Patient tolerance of procedure:  Tolerated well, no immediate complications        RADIOLOGY RESULTS  CT Head Without Contrast   Preliminary Result   No evidence of fracture or of intracranial hemorrhage.   Atrophy and vascular calcification is noted.        The above information was called to and discussed with Dr. Naidu.               Radiation dose reduction techniques were utilized, including automated   exposure control and exposure modulation based on body size.                    PROGRESS, DATA ANALYSIS, CONSULTS AND MEDICAL DECISION MAKING  All labs have been independently reviewed by me.  All radiology  "studies have been reviewed by me and discussed with radiologist dictating the report.  EKG's independently viewed and interpreted by me unless stated otherwise. Discussion below represents my analysis of pertinent findings related to patient's condition, differential diagnosis, treatment plan and final disposition.        DDX: fall at nursing home, left eyebrow laceration, h/o dementia    MDM:  CT head normal, no orbital pain.  Lac repair completed.  Pt is at his baseline.     Reviewed pt's history and workup with Dr. Naidu.  After a bedside evaluation, Dr. Naidu agrees with the plan of care.    The patient's history, physical exam, and lab findings were discussed with the physician, who also performed a face to face history and physical exam.  I discussed all results and noted any abnormalities with patient.  Discussed absoute need to recheck abnormalities with their family physician.  I answered any of the patient's questions.  Discussed plan for discharge, as there is no emergent indication for admission.  Pt is agreeable and understands need for follow up and repeat testing.  Pt is aware that discharge does not mean that nothing is wrong but it indicates no emergency is present and they must continue care with their family physician.  Pt is discharged with instructions to follow up with primary care doctor to have their blood pressure rechecked.       Disposition vitals:  /66 (BP Location: Left arm, Patient Position: Lying)   Pulse 91   Temp 97.8 °F (36.6 °C)   Resp 18   Ht 177.8 cm (70\")   Wt 76.2 kg (168 lb)   SpO2 99%   BMI 24.11 kg/m²       DIAGNOSIS  Final diagnoses:   Fall at nursing home, initial encounter   Eyebrow laceration, left, initial encounter       FOLLOW UP   Chris Dahl MD  [id number] [id number] [phone]    Schedule an appointment as soon as possible for a visit   For wound re-check         Judy Montez, JESSEE  01/06/21 5724    "

## 2021-01-06 NOTE — ED NOTES
Nursing home report pt had an unwitnessed fall @1hr ago. Pt has a laceration above left eye. Pt does not take blood thinners. Pt is confused but [facility] home report he is at his normal baseline.     Pt arrived wearing a face mask.     Zaynab Domingo, RN  01/06/21 [id number]

## 2021-01-07 NOTE — ED NOTES
[facility] called for transport, at this time, ETA [id number]     Marcy Agustin, LUIS  01/06/21 [id number]

## 2021-01-07 NOTE — DISCHARGE INSTRUCTIONS
Call for assistance when getting up    Although you are being discharged from the ED today, I encourage you to return for worsening symptoms. Things can, and do, change such that treatment at home with medication may not be adequate. Specifically I recommend returning for chest pain or discomfort, difficulty breathing, persistent vomiting or difficulty holding down liquids or medications, fever > 102.0 F, or any other worsening or alarming symptoms.     Rest. Drink plenty of fluids.  Follow up with PCP or provider listed for further evaluation and management.  Follow up with primary care provider for further management and to have blood pressure rechecked.  Take all medications as prescribed.

## 2021-01-13 PROBLEM — A41.9 SEPSIS (HCC): Status: ACTIVE | Noted: 2021-01-01

## 2021-01-13 NOTE — ED NOTES
Pt to ER via EMS from [facility] for altered mental status x 2 days. NH states pt is up and mobile normally, but pt appears contracted. Initial SpO2 88% per EMS, but breathes through mouth.    This RN is wearing mask, gloves and goggles at all times during all patient interactions.     Lonnie Hernandez, RN  01/13/21 [id number]

## 2021-01-13 NOTE — CONSULTS
"  Referring Provider: Gege  Reason for Consultation: Hypernatremia    Subjective     Chief complaint   Chief Complaint   Patient presents with   • Altered Mental Status       History of present illness:  79 yo nursing home resident with dementia.  Admitted with decreased level of consciousness.  Recent ER visit 1/6/21 for Left brow LAC after fall.  Today, not verbal, found to have sodium 162.  Initial hypotension, but given 2 liters NS with bP improved.  Procal and Lactic acid elevated.   Started empirically on Zosyn and Vanc.  He is not verbal today.      Past Medical History:   Diagnosis Date   • Acute myocardial infarction (CMS/HCC)    • Anesthesia complication     DIFFICULTY COMING OUT OF SEDATION   • Aortic valve sclerosis    • Coronary artery disease     History of remote anterior wall myocardial infarction in 1989.   • Diabetes mellitus (CMS/HCC)    • Disease of thyroid gland    • Erectile dysfunction    • Fall     fell and was seen in the ED, \"his leg gave out on him\"   • Hearing difficulty of both ears     Normally wears hearing aids   • Heart attack (CMS/HCC)    • Heart attack (CMS/HCC)    • Heart murmur    • Hyperlipidemia    • Intermittent claudication (CMS/HCC)    • Low back pain    • Osteoarthritis    • Peripheral neuropathy    • Peripheral vascular disease (CMS/HCC)    • Skin cancer    • Sleep apnea     cpap, can't use due to drooling   • Spinal stenosis      Past Surgical History:   Procedure Laterality Date   • ATHERECTOMY Left     BRANT FEMORAL-POPLITEAL INITIAL STENOSIS WITH ATHERECTOMY AND STENT LEFT   • CATARACT EXTRACTION, BILATERAL     • COLONOSCOPY     • COLONOSCOPY W/ POLYPECTOMY      BENIGN   • CORONARY ANGIOPLASTY WITH STENT PLACEMENT     • CORONARY ARTERY BYPASS GRAFT  04/2010    cabg x 3: LIMA to LAD, vein graft to RCA and OM1   • EPIDURAL  01/2019   • LUMBAR LAMINECTOMY DISCECTOMY DECOMPRESSION Bilateral 6/27/2017    Procedure: L3/4, L4/5 OPEN LUMBAR DECOMPRESSION POSTERIOR 1-2 LEVELS;  " "Surgeon: Ranjeet Contreras MD;  Location: [facility] OR;  Service:    • SPINE SURGERY     • THROMBOENDARTERECTOMY Left     NEUROLOGICAL SURGERY CAROTID ENDARTERECTOMY LEFT     Family History   Problem Relation Age of Onset   • Breast cancer Mother    • Cancer Mother    • Brain cancer Sister    • Other Brother         CABG   • Stroke Brother    • Heart disease Brother    • Heart attack Brother    • Malig Hyperthermia Neg Hx        Social History     Tobacco Use   • Smoking status: Former Smoker     Packs/day: 1.00     Years: 20.00     Pack years: 20.00     Quit date:      Years since quittin.0   • Smokeless tobacco: Never Used   Substance Use Topics   • Alcohol use: No   • Drug use: No     (Not in a hospital admission)    Allergies:  Ativan [lorazepam], Benzodiazepines, Diazepam, and Farxiga [dapagliflozin]    Review of Systems  Unable to obtain. Not verbal.     Objective     Vital Signs  Temp:  [97.7 °F (36.5 °C)] 97.7 °F (36.5 °C)  Heart Rate:  [72-86] 72  Resp:  [16] 16  BP: (105-149)/(80-92) 149/80    Flowsheet Rows      First Filed Value   Admission Height  177 cm (69.69\") Documented at 2021 1050   Admission Weight  72.6 kg (160 lb) Documented at 2021 1050           I/O this shift:  In:  [IV Piggyback:]  Out: -   No intake/output data recorded.    Intake/Output Summary (Last 24 hours) at 2021 1239  Last data filed at 2021 1205  Gross per 24 hour   Intake 2000 ml   Output --   Net 2000 ml       Physical Exam:  General Appearance: Very frail, elderly. Lying in bed. Mask on.I wore Full PPE.  Only briefly turns his head to voice.  Does not follow any commands.  Pupils small, but reactive to light.  No scleral icterus. Left brow bruising.   Skin: warm and dry. Multiple areas of eschar from minor wounds on arms and legs.  Ecchymoses on arms.    Neck: supple, no JVD, trachea midline  Heart: RRR, normal S1 and S2, no S3, no rub  Lungs clear to auscultation, no wheezing. Unlabored " respirations.   Abdomen: scaphoid. Soft, nontender, + bs.   No body wall edema  Extremities: no edema, cyanosis or clubbing. No muscle mass.  Neuro: not verbal.  Does not follow commands.  Does localize to voice and turns his head once to make eye contact, but only briefly.     Results Review:  Results for orders placed or performed during the hospital encounter of 01/13/21   Respiratory Panel PCR w/COVID-19(SARS-CoV-2) LAW/AUGUSTINE/JORGE/PAD/COR/MAD/BRENDEN In-House, NP Swab in UTM/VTM, 3-4 HR TAT - Swab, Nasopharynx    Specimen: Nasopharynx; Swab   Result Value Ref Range    ADENOVIRUS, PCR Not Detected Not Detected    Coronavirus 229E Not Detected Not Detected    Coronavirus HKU1 Not Detected Not Detected    Coronavirus NL63 Not Detected Not Detected    Coronavirus OC43 Not Detected Not Detected    COVID19 Not Detected Not Detected - Ref. Range    Human Metapneumovirus Not Detected Not Detected    Human Rhinovirus/Enterovirus Not Detected Not Detected    Influenza A PCR Not Detected Not Detected    Influenza B PCR Not Detected Not Detected    Parainfluenza Virus 1 Not Detected Not Detected    Parainfluenza Virus 2 Not Detected Not Detected    Parainfluenza Virus 3 Not Detected Not Detected    Parainfluenza Virus 4 Not Detected Not Detected    RSV, PCR Not Detected Not Detected    Bordetella pertussis pcr Not Detected Not Detected    Bordetella parapertussis PCR Not Detected Not Detected    Chlamydophila pneumoniae PCR Not Detected Not Detected    Mycoplasma pneumo by PCR Not Detected Not Detected   Comprehensive Metabolic Panel    Specimen: Arm, Right; Blood   Result Value Ref Range    Glucose 178 (H) 65 - 99 mg/dL    BUN 47 (H) 8 - 23 mg/dL    Creatinine 1.08 0.76 - 1.27 mg/dL    Sodium 162 (C) 136 - 145 mmol/L    Potassium 3.6 3.5 - 5.2 mmol/L    Chloride 115 (H) 98 - 107 mmol/L    CO2 31.1 (H) 22.0 - 29.0 mmol/L    Calcium 9.5 8.6 - 10.5 mg/dL    Total Protein 7.8 6.0 - 8.5 g/dL    Albumin 3.80 3.50 - 5.20 g/dL    ALT  (SGPT) 25 1 - 41 U/L    AST (SGOT) 19 1 - 40 U/L    Alkaline Phosphatase 136 (H) 39 - 117 U/L    Total Bilirubin 0.6 0.0 - 1.2 mg/dL    eGFR Non African Amer 66 >60 mL/min/1.73    Globulin 4.0 gm/dL    A/G Ratio 1.0 g/dL    BUN/Creatinine Ratio 43.5 (H) 7.0 - 25.0    Anion Gap 15.9 (H) 5.0 - 15.0 mmol/L   Urinalysis With Culture If Indicated - Urine, Catheter    Specimen: Urine, Catheter   Result Value Ref Range    Color, UA Yellow Yellow, Straw    Appearance, UA Clear Clear    pH, UA <=5.0 5.0 - 8.0    Specific Gravity, UA >=1.030 1.005 - 1.030    Glucose, UA >=1000 mg/dL (3+) (A) Negative    Ketones, UA Trace (A) Negative    Bilirubin, UA Negative Negative    Blood, UA Small (1+) (A) Negative    Protein, UA Negative Negative    Leuk Esterase, UA Negative Negative    Nitrite, UA Negative Negative    Urobilinogen, UA 1.0 E.U./dL 0.2 - 1.0 E.U./dL   Troponin    Specimen: Arm, Right; Blood   Result Value Ref Range    Troponin T 0.056 (C) 0.000 - 0.030 ng/mL   Lactic Acid, Plasma    Specimen: Arm, Right; Blood   Result Value Ref Range    Lactate 3.9 (C) 0.5 - 2.0 mmol/L   Procalcitonin    Specimen: Arm, Right; Blood   Result Value Ref Range    Procalcitonin 0.64 (H) 0.00 - 0.25 ng/mL   Protime-INR    Specimen: Arm, Right; Blood   Result Value Ref Range    Protime 17.7 (H) 11.7 - 14.2 Seconds    INR 1.48 (H) 0.90 - 1.10   CBC Auto Differential    Specimen: Arm, Right; Blood   Result Value Ref Range    WBC 15.04 (H) 3.40 - 10.80 10*3/mm3    RBC 4.93 4.14 - 5.80 10*6/mm3    Hemoglobin 14.6 13.0 - 17.7 g/dL    Hematocrit 45.0 37.5 - 51.0 %    MCV 91.3 79.0 - 97.0 fL    MCH 29.6 26.6 - 33.0 pg    MCHC 32.4 31.5 - 35.7 g/dL    RDW 12.9 12.3 - 15.4 %    RDW-SD 43.0 37.0 - 54.0 fl    MPV 10.7 6.0 - 12.0 fL    Platelets 231 140 - 450 10*3/mm3    Neutrophil % 90.8 (H) 42.7 - 76.0 %    Lymphocyte % 4.5 (L) 19.6 - 45.3 %    Monocyte % 3.7 (L) 5.0 - 12.0 %    Eosinophil % 0.0 (L) 0.3 - 6.2 %    Basophil % 0.5 0.0 - 1.5 %     Immature Grans % 0.5 0.0 - 0.5 %    Neutrophils, Absolute 13.66 (H) 1.70 - 7.00 10*3/mm3    Lymphocytes, Absolute 0.68 (L) 0.70 - 3.10 10*3/mm3    Monocytes, Absolute 0.55 0.10 - 0.90 10*3/mm3    Eosinophils, Absolute 0.00 0.00 - 0.40 10*3/mm3    Basophils, Absolute 0.07 0.00 - 0.20 10*3/mm3    Immature Grans, Absolute 0.08 (H) 0.00 - 0.05 10*3/mm3    nRBC 0.0 0.0 - 0.2 /100 WBC   Urinalysis, Microscopic Only - Urine, Catheter    Specimen: Urine, Catheter   Result Value Ref Range    RBC, UA 13-20 (A) None Seen, 0-2 /HPF    WBC, UA 0-2 None Seen, 0-2 /HPF    Bacteria, UA None Seen None Seen /HPF    Squamous Epithelial Cells, UA 0-2 None Seen, 0-2 /HPF    Hyaline Casts, UA 3-6 None Seen /LPF    Methodology Automated Microscopy      Imaging Results (Last 72 Hours)     Procedure Component Value Units Date/Time    XR Chest 1 View [[id number]] Collected: 01/13/21 1055     Updated: 01/13/21 1103    Narrative:      ONE VIEW PORTABLE CHEST AT 10:06 AM     HISTORY: Hypoxemia. Aortic stenosis.     FINDINGS: The lungs are well-expanded with what appear to be skin folds  producing an appearance of pneumothorax at the right base laterally and  to a lesser extent at the left base as seen on 2 separate portable  images. There is some chronic right apical parenchymal pleural scarring.  The heart is top normal in size with sternal wires from previous cardiac  surgery.     This report was finalized on 1/13/2021 11:00 AM by Dr. Rodrigo Chavez M.D.       CT Head Without Contrast [[id number]] Collected: 01/13/21 1011     Updated: 01/13/21 1011    Narrative:      CT HEAD WITHOUT CONTRAST     HISTORY: Altered mental status, contusion left supraorbital region.     COMPARISON: CT head 01/06/2021.     FINDINGS: There is age-appropriate atrophy. There is no evidence of  hemorrhage, hydrocephalus or of abnormal extra-axial fluid. No focal  area of decreased attenuation to suggest acute infarction is identified.  Bone windows showed no evidence  of a calvarial fracture. Lucency related  to the maxilla posterolaterally to the left is appreciated consistent  with an odontogenic cyst measuring approximately 12 mm in size,  unchanged. Mild to moderate vascular calcification involving carotid  siphons are noted bilaterally.       Impression:      No evidence of fracture or hemorrhage. Mild to moderate  vascular calcification and odontogenic cyst involving the maxilla  posterolaterally to the left are noted.           Radiation dose reduction techniques were utilized, including automated  exposure control and exposure modulation based on body size.                  piperacillin-tazobactam, 3.375 g, Intravenous, Once  vancomycin, 20 mg/kg, Intravenous, Once      sodium chloride, 125 mL/hr, Last Rate: 125 mL/hr (01/13/21 1210)        Assessment/Plan   1. Hypernatremia due to hypovolemia.  Has received 2 liters NS for volume resuscitation, will change to 1/2 NS for now and monitor sodium very closely.  Hemoconcentration with Hg 14.6.   2. Hypotension and lactic acidemia, elevated Procal.  Leukocytosis.  Resp panel negative for COVID. Source unclear.  Empiric antibiotic started.   3. Dementia with superimposed altered mental status.  4. Recent left brow LAC sp fall 1/6.      Mary Yang MD  01/13/21  12:39 EST

## 2021-01-13 NOTE — PROGRESS NOTES
"Pharmacokinetic Consult - Zosyn Dosing    Anthony Hernandez is a 80 y.o. male who is on day 1 pharmacy to dose Zosyn for sepsis.  Pharmacy dosing Zosyn per JESSEE Hollingsworth's request.   Other antimicrobials: n/a    Relevant clinical data and objective history reviewed:  177 cm (69.69\")  72.6 kg (160 lb)  Body mass index is 23.17 kg/m².     He has a past medical history of Acute myocardial infarction (CMS/HCC), Anesthesia complication, Aortic valve sclerosis, Coronary artery disease, Diabetes mellitus (CMS/HCC), Disease of thyroid gland, Erectile dysfunction, Fall, Hearing difficulty of both ears, Heart attack (CMS/HCC), Heart attack (CMS/HCC), Heart murmur, Hyperlipidemia, Intermittent claudication (CMS/HCC), Low back pain, Osteoarthritis, Peripheral neuropathy, Peripheral vascular disease (CMS/HCC), Skin cancer, Sleep apnea, and Spinal stenosis.    Allergies as of 01/13/2021 - Reviewed 01/13/2021   Allergen Reaction Noted   • Ativan [lorazepam] Hallucinations 07/11/2017   • Benzodiazepines Hallucinations 07/11/2017   • Diazepam Hallucinations 07/11/2017   • Farxiga [dapagliflozin] Other (See Comments) 04/01/2019     Vital Signs (last 24 hours)       01/12 0700  -  01/13 0659 01/13 0700  -  01/13 1601   Most Recent    Temp (°F)     97.7     97.7 (36.5)    Heart Rate   64 -  86     74    Resp   12 -  16     16    BP   105/92 -  149/80     140/81    SpO2 (%)   90 -  100     93        Estimated Creatinine Clearance: 65.8 mL/min (by C-G formula based on SCr of 0.92 mg/dL).  Results from last 7 days   Lab Units 01/13/21  1436 01/13/21  0935   CREATININE mg/dL 0.92 1.08     Results from last 7 days   Lab Units 01/13/21  0935   WBC 10*3/mm3 15.04*     Baseline culture/source/susceptibility:   Microbiology Results (last 10 days)     Procedure Component Value - Date/Time    Respiratory Panel PCR w/COVID-19(SARS-CoV-2) LAW/AUGUSTINE/JORGE/PAD/COR/MAD/BRENDEN In-House, NP Swab in UTM/[facility], 3-4 HR TAT - Swab, Nasopharynx [[id number]]  (Normal) " Collected: 21 0914    Lab Status: Final result Specimen: Swab from Nasopharynx Updated: 21 1200     ADENOVIRUS, PCR Not Detected     Coronavirus 229E Not Detected     Coronavirus HKU1 Not Detected     Coronavirus NL63 Not Detected     Coronavirus OC43 Not Detected     COVID19 Not Detected     Human Metapneumovirus Not Detected     Human Rhinovirus/Enterovirus Not Detected     Influenza A PCR Not Detected     Influenza B PCR Not Detected     Parainfluenza Virus 1 Not Detected     Parainfluenza Virus 2 Not Detected     Parainfluenza Virus 3 Not Detected     Parainfluenza Virus 4 Not Detected     RSV, PCR Not Detected     Bordetella pertussis pcr Not Detected     Bordetella parapertussis PCR Not Detected     Chlamydophila pneumoniae PCR Not Detected     Mycoplasma pneumo by PCR Not Detected    Narrative:      Fact sheet for providers: https://docs.[employer]/wp-content/uploads/MNN9457-6207-HK2.1-EUA-Provider-Fact-Sheet-3.pdf    Fact sheet for patients: https://docs.[employer]Submit/wp-content/uploads/MVQ3664-9281-IV7.1-EUA-Patient-Fact-Sheet-1.pdf    Test performed by PCR.            Imagin21: ONE VIEW PORTABLE CHEST AT 10:06 AM     HISTORY: Hypoxemia. Aortic stenosis.     FINDINGS: The lungs are well-expanded with what appear to be skin folds  producing an appearance of pneumothorax at the right base laterally and  to a lesser extent at the left base as seen on 2 separate portable  images. There is some chronic right apical parenchymal pleural scarring.  The heart is top normal in size with sternal wires from previous cardiac  surgery.     This report was finalized on 2021 11:00 AM by Dr. Rodrigo Chavez M.D.    21: CT HEAD WITHOUT CONTRAST     HISTORY: Altered mental status, contusion left supraorbital region.     COMPARISON: CT head 2021.     FINDINGS: There is age-appropriate atrophy. There is no evidence of  hemorrhage, hydrocephalus or of abnormal extra-axial fluid. No  focal  area of decreased attenuation to suggest acute infarction is identified.  Bone windows showed no evidence of a calvarial fracture. Lucency related  to the maxilla posterolaterally to the left is appreciated consistent  with an odontogenic cyst measuring approximately 12 mm in size,  unchanged. Mild to moderate vascular calcification involving carotid  siphons are noted bilaterally.     IMPRESSION:  No evidence of fracture or hemorrhage. Mild to moderate  vascular calcification and odontogenic cyst involving the maxilla  posterolaterally to the left are noted.           Radiation dose reduction techniques were utilized, including automated  exposure control and exposure modulation based on body size.           Anti-Infectives (From admission, onward)    Ordered     Dose/Rate Route Frequency Start Stop    01/13/21 1600  piperacillin-tazobactam (ZOSYN) 3.375 g in iso-osmotic dextrose 50 ml (premix)     Ordering Provider: Susanna Son APRN    3.375 g  over 4 Hours Intravenous Every 8 Hours 01/13/21 1700 01/18/21 1659    01/13/21 1530  Pharmacy to Dose Zosyn     Ordering Provider: Susanna Son APRN     Does not apply Continuous PRN 01/13/21 1530 01/18/21 1529    01/13/21 1059  vancomycin 1500 mg/500 mL 0.9% NS IVPB ([facility])     Ordering Provider: Mike Manning MD    20 mg/kg × 72.6 kg Intravenous Once 01/13/21 1101 01/13/21 1249    01/13/21 1059  piperacillin-tazobactam (ZOSYN) 3.375 g in iso-osmotic dextrose 50 ml (premix)     Ordering Provider: Mike Manning MD    3.375 g  over 30 Minutes Intravenous Once 01/13/21 1101 01/13/21 1248           Assessment/Plan  Patient received first dose of Zosyn 3.375 grams IV in the ED at 1211; timed next dose to start at 1800 which is 6 hours from the first dose then subsequent doses will be given every 8 hours.     · Zosyn 3.375 grams IV q8h as per [facility] dosing guidelines as estimated CrCl 65.8 at this time    Pharmacy will continue to follow daily while on Zosyn  and adjust as needed.     Tung Wesley, PharmD  16:05 EST

## 2021-01-13 NOTE — ED PROVIDER NOTES
EMERGENCY DEPARTMENT ENCOUNTER    Room Number:  33/33  Date of encounter:  1/13/2021  PCP: Chris Dahl MD  Historian: [facility]    Patient was placed in face mask during triage process. Patient was wearing facemask when I entered the room and throughout our encounter. I wore full protective equipment throughout this patient encounter including a face mask, eye protection, and gloves. Hand hygiene was performed before donning protective equipment and again following doffing of PPE after leaving the room.    HPI:  Chief Complaint: Altered mental status  A complete HPI/ROS/PMH/PSH/SH/FH are unobtainable due to: Dementia with acute altered mental status  Context: Anthony Hernandez is a 80 y.o. male who is nursing facility resident with a history of dementia presents to the ED for further evaluation of what is reported to be gradual onset acute on chronic altered mental status over the last 2 days.  Patient nonverbal at this time is reportedly able to walk at baseline as well as interactive.      MEDICAL HISTORY REVIEW  ER visit 1/6/2021 for evaluation of head injury and left eyebrow laceration.    Date of Admission: 9/22/2019  Date of Discharge:  9/25/2019  Primary Care Physician: Chris Dahl MD        Chief Complaint:   Fall and Rib Pain        Discharge Diagnoses            Active Hospital Problems     Diagnosis   POA   • **Traumatic fracture of ribs with pneumothorax [S22.49XA, S27.0XXA]   Yes   • Vertebral compression fracture (CMS/HCC)- L1 [M48.50XA]   Yes   • Acute respiratory failure with hypoxia (CMS/HCC) [J96.01]   Yes   • Dementia without behavioral disturbance [F03.90]   Yes   • Diabetes mellitus type 2, noninsulin dependent (CMS/HCC) [E11.9]           PAST MEDICAL HISTORY  Active Ambulatory Problems     Diagnosis Date Noted   • Aortic heart valve narrowing 02/15/2016   • Asymptomatic carotid artery narrowing without infarction 02/15/2016   • Cor pulmonale (CMS/HCC) 02/15/2016    • Functional murmur 02/15/2016   • BP (high blood pressure) 02/15/2016   • Lumbar radiculopathy 02/15/2016   • CN (constipation) 02/15/2016   • DDD (degenerative disc disease), lumbar 02/15/2016   • Fatigue 02/15/2016   • HLD (hyperlipidemia) 02/15/2016   • Amnesia 02/15/2016   • ZAIN (obstructive sleep apnea) 02/15/2016   • Loud breathing during sleep 02/15/2016   • Diabetes mellitus type 2, noninsulin dependent (CMS/HCC) 02/15/2016   • Multiple thyroid nodules 03/30/2017   • Spinal stenosis of lumbar region with neurogenic claudication 06/27/2017   • Difficulty walking 06/28/2017   • Hearing loss of both ears 09/14/2017   • Episodic confusion 11/06/2017   • Altered mental status 12/15/2017   • Late onset Alzheimer's disease with behavioral disturbance (CMS/HCC) 01/05/2018   • Dementia without behavioral disturbance (CMS/HCC) 05/09/2018   • Hypothyroidism 05/09/2018   • Acute intractable headache 06/14/2018   • Herniation of lumbar intervertebral disc with radiculopathy 01/14/2019   • Halitosis 03/05/2019   • Traumatic fracture of ribs with pneumothorax 09/22/2019   • Vertebral compression fracture (CMS/HCC)- L1 09/22/2019   • Acute respiratory failure with hypoxia (CMS/HCC) 09/22/2019     Resolved Ambulatory Problems     Diagnosis Date Noted   • Back ache 02/15/2016   • Bulge of lumbar disc without myelopathy 02/15/2016   • Leg pain 02/15/2016   • Ache in joint 02/15/2016   • LBP (low back pain) 02/15/2016   • Lumbar canal stenosis 02/15/2016   • Surgery follow-up examination 07/11/2017     Past Medical History:   Diagnosis Date   • Acute myocardial infarction (CMS/HCC)    • Anesthesia complication    • Aortic valve sclerosis    • Coronary artery disease    • Diabetes mellitus (CMS/HCC)    • Disease of thyroid gland    • Erectile dysfunction    • Fall    • Hearing difficulty of both ears    • Heart attack (CMS/HCC)    • Heart attack (CMS/HCC)    • Heart murmur    • Hyperlipidemia    • Intermittent claudication  (CMS/[facility])    • Osteoarthritis    • Peripheral neuropathy    • Peripheral vascular disease (CMS/[facility])    • Skin cancer    • Sleep apnea    • Spinal stenosis          PAST SURGICAL HISTORY  Past Surgical History:   Procedure Laterality Date   • ATHERECTOMY Left     BRANT FEMORAL-POPLITEAL INITIAL STENOSIS WITH ATHERECTOMY AND STENT LEFT   • CATARACT EXTRACTION, BILATERAL     • COLONOSCOPY     • COLONOSCOPY W/ POLYPECTOMY      BENIGN   • CORONARY ANGIOPLASTY WITH STENT PLACEMENT     • CORONARY ARTERY BYPASS GRAFT  2010    cabg x 3: LIMA to LAD, vein graft to RCA and OM1   • EPIDURAL  2019   • LUMBAR LAMINECTOMY DISCECTOMY DECOMPRESSION Bilateral 2017    Procedure: L3/4, L4/5 OPEN LUMBAR DECOMPRESSION POSTERIOR 1-2 LEVELS;  Surgeon: Ranjeet Contreras MD;  Location: [facility] OR;  Service:    • SPINE SURGERY     • THROMBOENDARTERECTOMY Left     NEUROLOGICAL SURGERY CAROTID ENDARTERECTOMY LEFT         FAMILY HISTORY  Family History   Problem Relation Age of Onset   • Breast cancer Mother    • Cancer Mother    • Brain cancer Sister    • Other Brother         CABG   • Stroke Brother    • Heart disease Brother    • Heart attack Brother    • Malig Hyperthermia Neg Hx          SOCIAL HISTORY  Social History     Socioeconomic History   • Marital status:      Spouse name: Not on file   • Number of children: Not on file   • Years of education: Not on file   • Highest education level: Not on file   Tobacco Use   • Smoking status: Former Smoker     Packs/day: 1.00     Years: 20.00     Pack years: 20.00     Quit date:      Years since quittin.0   • Smokeless tobacco: Never Used   Substance and Sexual Activity   • Alcohol use: No   • Drug use: No   • Sexual activity: Never         ALLERGIES  Ativan [lorazepam], Benzodiazepines, Diazepam, and Farxiga [dapagliflozin]        REVIEW OF SYSTEMS  Review of Systems     All systems reviewed and negative except for those discussed in HPI.       PHYSICAL EXAM    I  have reviewed the triage vital signs and nursing notes.    ED Triage Vitals   Temp Pulse Resp BP SpO2   -- -- -- -- --      Temp src Heart Rate Source Patient Position BP Location FiO2 (%)   -- -- -- -- --       Physical Exam    Physical Exam   Constitutional: No distress.  Chronically ill-appearing.  Minimally interactive does not follow exam requests.  HENT:  Head: Normocephalic with age-indeterminate ecchymosis and abrasion versus laceration left eyebrow.  No bony deformity noted..   Oropharynx: Mucous membranes are quite dry.   Eyes: No scleral icterus. No conjunctival pallor.  Pupils are equal and round  Neck: No midline cervical tenderness to palpation noted..   Cardiovascular: Pink warm and well-perfused with palpable left radial pulse..  Pulmonary/Chest: No respiratory distress.  No significant tachypnea noted.  Abdominal: Very thin habitus.  Soft. There is no notable discomfort with palpation identified.    Musculoskeletal: Patient moves all 4 extremities spontaneously but rather weakly.  Diffuse ecchymosis of varying age noted.  No acute appearing trauma to the extremities or back.    Neurological: Eyes spontaneously open.  Does not follow exam requests.  Spontaneously moves all 4 extremities.  Skin: Skin is pink, warm, and dry. No pallor.   Psychiatric: Unable to assess at this time.  Nursing note and vitals reviewed.    LAB RESULTS  Recent Results (from the past 24 hour(s))   Comprehensive Metabolic Panel    Collection Time: 01/13/21  9:35 AM    Specimen: Arm, Right; Blood   Result Value Ref Range    Glucose 178 (H) 65 - 99 mg/dL    BUN 47 (H) 8 - 23 mg/dL    Creatinine 1.08 0.76 - 1.27 mg/dL    Sodium 162 (C) 136 - 145 mmol/L    Potassium 3.6 3.5 - 5.2 mmol/L    Chloride 115 (H) 98 - 107 mmol/L    CO2 31.1 (H) 22.0 - 29.0 mmol/L    Calcium 9.5 8.6 - 10.5 mg/dL    Total Protein 7.8 6.0 - 8.5 g/dL    Albumin 3.80 3.50 - 5.20 g/dL    ALT (SGPT) 25 1 - 41 U/L    AST (SGOT) 19 1 - 40 U/L    Alkaline  Phosphatase 136 (H) 39 - 117 U/L    Total Bilirubin 0.6 0.0 - 1.2 mg/dL    eGFR Non African Amer 66 >60 mL/min/1.73    Globulin 4.0 gm/dL    A/G Ratio 1.0 g/dL    BUN/Creatinine Ratio 43.5 (H) 7.0 - 25.0    Anion Gap 15.9 (H) 5.0 - 15.0 mmol/L   Troponin    Collection Time: 01/13/21  9:35 AM    Specimen: Arm, Right; Blood   Result Value Ref Range    Troponin T 0.056 (C) 0.000 - 0.030 ng/mL   Lactic Acid, Plasma    Collection Time: 01/13/21  9:35 AM    Specimen: Arm, Right; Blood   Result Value Ref Range    Lactate 3.9 (C) 0.5 - 2.0 mmol/L   Procalcitonin    Collection Time: 01/13/21  9:35 AM    Specimen: Arm, Right; Blood   Result Value Ref Range    Procalcitonin 0.64 (H) 0.00 - 0.25 ng/mL   Protime-INR    Collection Time: 01/13/21  9:35 AM    Specimen: Arm, Right; Blood   Result Value Ref Range    Protime 17.7 (H) 11.7 - 14.2 Seconds    INR 1.48 (H) 0.90 - 1.10   CBC Auto Differential    Collection Time: 01/13/21  9:35 AM    Specimen: Arm, Right; Blood   Result Value Ref Range    WBC 15.04 (H) 3.40 - 10.80 10*3/mm3    RBC 4.93 4.14 - 5.80 10*6/mm3    Hemoglobin 14.6 13.0 - 17.7 g/dL    Hematocrit 45.0 37.5 - 51.0 %    MCV 91.3 79.0 - 97.0 fL    MCH 29.6 26.6 - 33.0 pg    MCHC 32.4 31.5 - 35.7 g/dL    RDW 12.9 12.3 - 15.4 %    RDW-SD 43.0 37.0 - 54.0 fl    MPV 10.7 6.0 - 12.0 fL    Platelets 231 140 - 450 10*3/mm3    Neutrophil % 90.8 (H) 42.7 - 76.0 %    Lymphocyte % 4.5 (L) 19.6 - 45.3 %    Monocyte % 3.7 (L) 5.0 - 12.0 %    Eosinophil % 0.0 (L) 0.3 - 6.2 %    Basophil % 0.5 0.0 - 1.5 %    Immature Grans % 0.5 0.0 - 0.5 %    Neutrophils, Absolute 13.66 (H) 1.70 - 7.00 10*3/mm3    Lymphocytes, Absolute 0.68 (L) 0.70 - 3.10 10*3/mm3    Monocytes, Absolute 0.55 0.10 - 0.90 10*3/mm3    Eosinophils, Absolute 0.00 0.00 - 0.40 10*3/mm3    Basophils, Absolute 0.07 0.00 - 0.20 10*3/mm3    Immature Grans, Absolute 0.08 (H) 0.00 - 0.05 10*3/mm3    nRBC 0.0 0.0 - 0.2 /100 WBC   Urinalysis With Culture If Indicated - Urine,  Catheter    Collection Time: 01/13/21 10:46 AM    Specimen: Urine, Catheter   Result Value Ref Range    Color, UA Yellow Yellow, Straw    Appearance, UA Clear Clear    pH, UA <=5.0 5.0 - 8.0    Specific Gravity, UA >=1.030 1.005 - 1.030    Glucose, UA >=1000 mg/dL (3+) (A) Negative    Ketones, UA Trace (A) Negative    Bilirubin, UA Negative Negative    Blood, UA Small (1+) (A) Negative    Protein, UA Negative Negative    Leuk Esterase, UA Negative Negative    Nitrite, UA Negative Negative    Urobilinogen, UA 1.0 E.U./dL 0.2 - 1.0 E.U./dL   Urinalysis, Microscopic Only - Urine, Catheter    Collection Time: 01/13/21 10:46 AM    Specimen: Urine, Catheter   Result Value Ref Range    RBC, UA 13-20 (A) None Seen, 0-2 /HPF    WBC, UA 0-2 None Seen, 0-2 /HPF    Bacteria, UA None Seen None Seen /HPF    Squamous Epithelial Cells, UA 0-2 None Seen, 0-2 /HPF    Hyaline Casts, UA 3-6 None Seen /LPF    Methodology Automated Microscopy        Ordered the above labs and independently reviewed the results.        RADIOLOGY  Ct Head Without Contrast    Result Date: 1/13/2021  CT HEAD WITHOUT CONTRAST  HISTORY: Altered mental status, contusion left supraorbital region.  COMPARISON: CT head 01/06/2021.  FINDINGS: There is age-appropriate atrophy. There is no evidence of hemorrhage, hydrocephalus or of abnormal extra-axial fluid. No focal area of decreased attenuation to suggest acute infarction is identified. Bone windows showed no evidence of a calvarial fracture. Lucency related to the maxilla posterolaterally to the left is appreciated consistent with an odontogenic cyst measuring approximately 12 mm in size, unchanged. Mild to moderate vascular calcification involving carotid siphons are noted bilaterally.      No evidence of fracture or hemorrhage. Mild to moderate vascular calcification and odontogenic cyst involving the maxilla posterolaterally to the left are noted.    Radiation dose reduction techniques were utilized,  including automated exposure control and exposure modulation based on body size.       Xr Chest 1 View    Result Date: 1/13/2021  ONE VIEW PORTABLE CHEST AT 10:06 AM  HISTORY: Hypoxemia. Aortic stenosis.  FINDINGS: The lungs are well-expanded with what appear to be skin folds producing an appearance of pneumothorax at the right base laterally and to a lesser extent at the left base as seen on 2 separate portable images. There is some chronic right apical parenchymal pleural scarring. The heart is top normal in size with sternal wires from previous cardiac surgery.  This report was finalized on 1/13/2021 11:00 AM by Dr. Rodrigo Chavez M.D.        I ordered the above noted radiological studies. Reviewed by me and discussed with radiologist.  See dictation for official radiology interpretation.      PROCEDURES    Procedures        MEDICATIONS GIVEN IN ER    Medications   sodium chloride 0.9 % flush 10 mL (has no administration in time range)   vancomycin 1500 mg/500 mL 0.9% NS IVPB ([facility]) (has no administration in time range)   piperacillin-tazobactam (ZOSYN) 3.375 g in iso-osmotic dextrose 50 ml (premix) (has no administration in time range)   sodium chloride 0.45 % infusion (has no administration in time range)   sodium chloride 0.9 % bolus 1,000 mL (1,000 mL Intravenous New Bag 1/13/21 1001)         PROGRESS, DATA ANALYSIS, CONSULTS, AND MEDICAL DECISION MAKING    My differential diagnosis for altered mental status includes but is not limited to:  Hypoglycemia, hyperglycemia, DKA, overdose, ethanol intoxication, thiamine deficiency, niacin deficiency, hypothymia, hyperviscosity, Yobany’s disease, hyponatremia, hypernatremia, liver failure, kidney failure, hyper or hypothyroid, no insufficiency, hypoxia, hypercarbia, carbon monoxide poisoning, postanoxic encephalopathy, ischemic stroke, intracranial bleed, subarachnoid hemorrhage, brain tumor, closed head injury, epidural hematoma, epidural hematoma, seizure activity,  postictal state, syncopal episode, disseminated encephalomyelitis, central pontine myelinolysis, post cardiac arrest, bacterial meningitis, viral meningitis, fungal meningitis, encephalitis, brain abscess, subdural empyema, hysteria, catatonic state, malingering, hypertensive encephalopathy, vasculitis, TTP, DIC      All labs have been independently reviewed by me.  All radiology studies have been reviewed by me and discussed with radiologist dictating the report.   EKG's independently viewed and interpreted by me.  Discussion below represents my analysis of pertinent findings related to patient's condition, differential diagnosis, treatment plan and final disposition.      ED Course as of Jan 13 1148 Wed Jan 13, 2021   1018 EKG           EKG time: 1010  Rhythm/Rate: Sinus, 85  P waves and ND: MECCA within normal limits  QRS, axis: Narrow QRS complex with slow R wave progression  ST and T waves: No clearly identified STEMI; QTC appears within normal limits  Interpretation is slightly limited by artifact.    Interpreted Contemporaneously by me, independently viewed  Comparison: 6/19/2017      [RS]   1057 Sodium(!!): 162 [RS]   1058 Troponin T(!!): 0.056 [RS]   1058 IV fluids, vancomycin and Zosyn initiated.   Lactate(!!): 3.9 [RS]   1058 Procalcitonin(!): 0.64 [RS]   1128 CONSULT        Provider: JESSEE Hollingsworth--[facility]    Discussion: Reviewed patient history, ED presentation and evaluation as well as therapies that have been initiated.  She does request that nephrology be involved in the patient's care to determine the patient is stable and appropriate for telemetry.  Agreeable to accept patient for full admission with telemetry pending nephrology recommendation on behalf of Dr. De Guzman.    Agreeable c treatment and planned disposition.            [RS]   1146 CONSULT        Provider: Dr. Minaya-nephrology    Discussion: Reviewed patient history, ED presentation and evaluation as well as therapies that have been  initiated.  She agrees the patient is stable for telemetry admission.  She would like to hold normal saline after initial 1 L bolus and change patient to half-normal saline at 125 mL/h.  Agreeable to consult.    Agreeable c treatment and planned disposition.            [RS]      ED Course User Index  [RS] Mike Manning MD       AS OF 11:48 EST VITALS:    BP - 105/92  HR - 79  TEMP - 97.7 °F (36.5 °C) (Rectal)  O2 SATS - 96%        DIAGNOSIS  Final diagnoses:   Sepsis, due to unspecified organism, unspecified whether acute organ dysfunction present (CMS/HCC)   Hypernatremia   NSTEMI (non-ST elevated myocardial infarction) (CMS/HCC)   Volume depletion   Chronic pneumothorax         DISPOSITION  ADMISSION    Discussed treatment plan and reason for admission with pt/family and admitting physician.  Pt/family voiced understanding of the plan for admission for further testing/treatment as needed.          Mike Manning MD  01/13/21 [id number]

## 2021-01-13 NOTE — PLAN OF CARE
Pt admitted to unit, is nonverbal, got home med list from [facility], and wife helped with database, male purewick applied, turmed q 2 hours, heel boots applied, barrier cream and mepilex applied to stage 2 on coccyx, given ivf, and iv abx, oral care done, vss, will ctm.

## 2021-01-13 NOTE — H&P
"    Patient Name:  Anthony Hernandez  YOB: 1940  MRN:  [id number]  Admit Date:  1/13/2021  Patient Care Team:  Chris Dahl MD as PCP - General (Family Medicine)      Subjective   History Present Illness     Chief Complaint   Patient presents with   • Altered Mental Status     History of Present Illness   Mr. Hernandez is a 80 y.o. former smoker with a history of DM2, CAD, dementia, HTN, HLD and ZAIN that presents to [facility] for altered mental status. The patient is unable to provide any history due to his altered mental state as well as advanced dementia. Per ED notes, the patient was brought from his LTC facility for worsening mental status for the past 2 days. Other details leading up to this are unknown.    Per his wife, Sandra, via the phone, the patient has been at LTC for over a year after a fall at home that resulted in rib fractures and a pneumothorax. He was at this facility and was followed by Cardiothoracic surgery who treated him with chest tube. Given his dementia and weakness, he went to rehab, but transitioned to LTC at the facility. The wife has not actually seen him since March due to the pandemic, but states his dementia was fairly advanced and he was starting not to recognize her. He would speak, but it would be difficult to make out what he was saying. She does report that the facility told her he wasn't eating much the last few days and he is normally a \"good eater.\" She states he was able to get up to the bathroom, but mainly pushed himself around in a wheelchair.    In the ED, lab work showed RVP with COVID19 negative. TSH normal. WBC 15.04 and hemoglobin 14.6. Procal was 0.64 and INR 1.48. lactic 3.9 and troponin 0.056. Blood cultures are pending. UA negative for infection. Sodium was 162 and creatinine 1.08. CXR showed well expanded lungs with possible skin folds producing an appearance of pneumothorax. Chronic right apical parenchymal pleural scarring noted. CT " "head was negative as well as CT head from 1/6 where he was in the ED after a fall at the facility. He was given empiric antibiotics and fluids. He has been admitted for further care.    Review of Systems   Unable to perform ROS: Mental status change      Personal History     Past Medical History:   Diagnosis Date   • Acute myocardial infarction (CMS/HCC)    • Anesthesia complication     DIFFICULTY COMING OUT OF SEDATION   • Aortic valve sclerosis    • Coronary artery disease     History of remote anterior wall myocardial infarction in 1989.   • Diabetes mellitus (CMS/HCC)    • Disease of thyroid gland    • Erectile dysfunction    • Fall     fell and was seen in the ED, \"his leg gave out on him\"   • Hearing difficulty of both ears     Normally wears hearing aids   • Heart attack (CMS/HCC)    • Heart attack (CMS/HCC)    • Heart murmur    • Hyperlipidemia    • Intermittent claudication (CMS/HCC)    • Low back pain    • Osteoarthritis    • Peripheral neuropathy    • Peripheral vascular disease (CMS/HCC)    • Skin cancer    • Sleep apnea     cpap, can't use due to drooling   • Spinal stenosis      Past Surgical History:   Procedure Laterality Date   • ATHERECTOMY Left     BRANT FEMORAL-POPLITEAL INITIAL STENOSIS WITH ATHERECTOMY AND STENT LEFT   • CATARACT EXTRACTION, BILATERAL     • COLONOSCOPY     • COLONOSCOPY W/ POLYPECTOMY      BENIGN   • CORONARY ANGIOPLASTY WITH STENT PLACEMENT     • CORONARY ARTERY BYPASS GRAFT  04/2010    cabg x 3: LIMA to LAD, vein graft to RCA and OM1   • EPIDURAL  01/2019   • LUMBAR LAMINECTOMY DISCECTOMY DECOMPRESSION Bilateral 6/27/2017    Procedure: L3/4, L4/5 OPEN LUMBAR DECOMPRESSION POSTERIOR 1-2 LEVELS;  Surgeon: Ranjeet Contreras MD;  Location: [facility];  Service:    • SPINE SURGERY     • THROMBOENDARTERECTOMY Left     NEUROLOGICAL SURGERY CAROTID ENDARTERECTOMY LEFT     Family History   Problem Relation Age of Onset   • Breast cancer Mother    • Cancer Mother    • Brain cancer " Sister    • Other Brother         CABG   • Stroke Brother    • Heart disease Brother    • Heart attack Brother    • Malig Hyperthermia Neg Hx      Social History     Tobacco Use   • Smoking status: Former Smoker     Packs/day: 1.00     Years: 20.00     Pack years: 20.00     Quit date:      Years since quittin.0   • Smokeless tobacco: Never Used   Substance Use Topics   • Alcohol use: No   • Drug use: No     Medications Prior to Admission   Medication Sig Dispense Refill Last Dose   • aspirin 81 MG EC tablet Take 81 mg by mouth Daily.      • atorvastatin (LIPITOR) 80 MG tablet TAKE 1 TABLET EVERY DAY 90 tablet 1    • Canagliflozin (INVOKANA) 100 MG tablet Take 1 tablet by mouth Daily. 30 tablet 11    • Ferrous Sulfate 27 MG tablet Take 27 mg by mouth Every Night.      • gabapentin (NEURONTIN) 300 MG capsule Take 1 capsule by mouth 3 (Three) Times a Day. Enough to last till mail order comes in 9 capsule 0    • Lancets (ONETOUCH ULTRASOFT) lancets Daily fasting glucose 100 each 12    • levothyroxine (SYNTHROID, LEVOTHROID) 75 MCG tablet TAKE 1 TABLET EVERY DAY 90 tablet 1    • meclizine (ANTIVERT) 12.5 MG tablet TAKE 1 TABLET THREE TIMES DAILY AS NEEDED FOR  DIZZINESS 45 tablet 1    • memantine (NAMENDA) 10 MG tablet Take 5 mg by mouth 2 (Two) Times a Day.      • ONE TOUCH ULTRA TEST test strip 1 each by Other route Daily. 100 each 5    • SITagliptin (JANUVIA) 100 MG tablet Take 1 tablet by mouth Daily. 90 tablet 1    • traMADol (ULTRAM) 50 MG tablet Take 1 tablet by mouth Every 8 (Eight) Hours As Needed for Moderate Pain . 9 tablet 0      Allergies:    Allergies   Allergen Reactions   • Ativan [Lorazepam] Hallucinations   • Benzodiazepines Hallucinations   • Diazepam Hallucinations   • Farxiga [Dapagliflozin] Other (See Comments)     URINARY PROBLEMS       Objective    Objective     Vital Signs  Temp:  [97.7 °F (36.5 °C)] 97.7 °F (36.5 °C)  Heart Rate:  [64-86] 74  Resp:  [12-16] 16  BP: (105-149)/(70-92)  140/81  SpO2:  [90 %-100 %] 93 %  on  Flow (L/min):  [2] 2;   Device (Oxygen Therapy): room air  Body mass index is 23.17 kg/m².    Physical Exam  Vitals signs and nursing note reviewed.   Constitutional:       Appearance: He is ill-appearing (chronically, cachetic, frail).   HENT:      Head: Normocephalic.      Right Ear: External ear normal.      Left Ear: External ear normal.      Nose: Nose normal.      Mouth/Throat:      Comments: Dry with poor oral care. Dried secretions on tongue.  Eyes:      General:         Right eye: No discharge.         Left eye: No discharge.      Conjunctiva/sclera: Conjunctivae normal.   Neck:      Musculoskeletal: Normal range of motion and neck supple.   Cardiovascular:      Rate and Rhythm: Normal rate and regular rhythm.      Pulses: Normal pulses.      Heart sounds: Murmur present.   Pulmonary:      Effort: Pulmonary effort is normal. No respiratory distress.      Comments: Diminished. Poor inspiratory effort.  Abdominal:      General: There is no distension.      Palpations: Abdomen is soft.      Tenderness: There is no abdominal tenderness.      Comments: Scaphoid abdomen.   Musculoskeletal:         General: No swelling.      Comments: Resisted ROM upper extremities.    Skin:     General: Skin is dry.      Coloration: Skin is pale.      Findings: Bruising present.   Neurological:      Mental Status: He is disoriented.      Motor: Weakness present.      Comments: Nonverbal, rigid upper extremities.    Psychiatric:      Comments: Withdrawn, nonverbal.         Results Review:  I reviewed the patient's new clinical results.  I reviewed the patient's new imaging results and agree with the interpretation.  I reviewed the patient's other test results and agree with the interpretation  I personally viewed and interpreted the patient's EKG/Telemetry data  Discussed with ED provider.    Lab Results (last 24 hours)     Procedure Component Value Units Date/Time    Respiratory Panel PCR  w/COVID-19(SARS-CoV-2) LAW/AUGUSTINE/JORGE/PAD/COR/MAD/BRENDEN In-House, NP Swab in UTM/VTM, 3-4 HR TAT - Swab, Nasopharynx [[id number]]  (Normal) Collected: 01/13/21 0914    Specimen: Swab from Nasopharynx Updated: 01/13/21 1200     ADENOVIRUS, PCR Not Detected     Coronavirus 229E Not Detected     Coronavirus HKU1 Not Detected     Coronavirus NL63 Not Detected     Coronavirus OC43 Not Detected     COVID19 Not Detected     Human Metapneumovirus Not Detected     Human Rhinovirus/Enterovirus Not Detected     Influenza A PCR Not Detected     Influenza B PCR Not Detected     Parainfluenza Virus 1 Not Detected     Parainfluenza Virus 2 Not Detected     Parainfluenza Virus 3 Not Detected     Parainfluenza Virus 4 Not Detected     RSV, PCR Not Detected     Bordetella pertussis pcr Not Detected     Bordetella parapertussis PCR Not Detected     Chlamydophila pneumoniae PCR Not Detected     Mycoplasma pneumo by PCR Not Detected    Narrative:      Fact sheet for providers: https://docs.[employer]wp-content/uploads/EKG4154-1092-CC8.1-EUA-Provider-Fact-Sheet-3.pdf    Fact sheet for patients: https://docs.[employer])/wp-content/uploads/KSW8021-1927-IY7.1-EUA-Patient-Fact-Sheet-1.pdf    Test performed by PCR.    CBC & Differential [[id number]]  (Abnormal) Collected: 01/13/21 0935    Specimen: Blood from Arm, Right Updated: 01/13/21 0957    Narrative:      The following orders were created for panel order CBC & Differential.  Procedure                               Abnormality         Status                     ---------                               -----------         ------                     CBC Auto Differential[[id number]]        Abnormal            Final result                 Please view results for these tests on the individual orders.    Comprehensive Metabolic Panel [[id number]]  (Abnormal) Collected: 01/13/21 0935    Specimen: Blood from Arm, Right Updated: 01/13/21 1049     Glucose 178 mg/dL      BUN 47 mg/dL      Creatinine  1.08 mg/dL      Sodium 162 mmol/L      Potassium 3.6 mmol/L      Chloride 115 mmol/L      CO2 31.1 mmol/L      Calcium 9.5 mg/dL      Total Protein 7.8 g/dL      Albumin 3.80 g/dL      ALT (SGPT) 25 U/L      AST (SGOT) 19 U/L      Alkaline Phosphatase 136 U/L      Total Bilirubin 0.6 mg/dL      eGFR Non African Amer 66 mL/min/1.73      Globulin 4.0 gm/dL      A/G Ratio 1.0 g/dL      BUN/Creatinine Ratio 43.5     Anion Gap 15.9 mmol/L     Narrative:      GFR Normal >60  Chronic Kidney Disease <60  Kidney Failure <15      Troponin [[id number]]  (Abnormal) Collected: 01/13/21 0935    Specimen: Blood from Arm, Right Updated: 01/13/21 1049     Troponin T 0.056 ng/mL     Narrative:      Troponin T Reference Range:  <= 0.03 ng/mL-   Negative for AMI  >0.03 ng/mL-     Abnormal for myocardial necrosis.  Clinicians would have to utilize clinical acumen, EKG, Troponin and serial changes to determine if it is an Acute Myocardial Infarction or myocardial injury due to an underlying chronic condition.       Results may be falsely decreased if patient taking Biotin.      Blood Culture - Blood, Arm, Right [[id number]] Collected: 01/13/21 0935    Specimen: Blood from Arm, Right Updated: 01/13/21 0945    Blood Culture - Blood, Arm, Left [[id number]] Collected: 01/13/21 0935    Specimen: Blood from Arm, Left Updated: 01/13/21 0945    Lactic Acid, Plasma [[id number]]  (Abnormal) Collected: 01/13/21 0935    Specimen: Blood from Arm, Right Updated: 01/13/21 1050     Lactate 3.9 mmol/L     Procalcitonin [[id number]]  (Abnormal) Collected: 01/13/21 0935    Specimen: Blood from Arm, Right Updated: 01/13/21 1052     Procalcitonin 0.64 ng/mL     Narrative:      As a Marker for Sepsis (Non-Neonates):   1. <0.5 ng/mL represents a low risk of severe sepsis and/or septic shock.  1. >2 ng/mL represents a high risk of severe sepsis and/or septic shock.    As a Marker for Lower Respiratory Tract Infections that require antibiotic therapy:  PCT on  "Admission     Antibiotic Therapy             6-12 Hrs later  > 0.5                Strongly Recommended            >0.25 - <0.5         Recommended  0.1 - 0.25           Discouraged                   Remeasure/reassess PCT  <0.1                 Strongly Discouraged          Remeasure/reassess PCT      As 28 day mortality risk marker: \"Change in Procalcitonin Result\" (> 80 % or <=80 %) if Day 0 (or Day 1) and Day 4 values are available. Refer to http://www.[facility]-pct-calculator.com/   Change in PCT <=80 %   A decrease of PCT levels below or equal to 80 % defines a positive change in PCT test result representing a higher risk for 28-day all-cause mortality of patients diagnosed with severe sepsis or septic shock.  Change in PCT > 80 %   A decrease of PCT levels of more than 80 % defines a negative change in PCT result representing a lower risk for 28-day all-cause mortality of patients diagnosed with severe sepsis or septic shock.                Results may be falsely decreased if patient taking Biotin.     Protime-INR [[id number]]  (Abnormal) Collected: 01/13/21 0935    Specimen: Blood from Arm, Right Updated: 01/13/21 1012     Protime 17.7 Seconds      INR 1.48    CBC Auto Differential [[id number]]  (Abnormal) Collected: 01/13/21 0935    Specimen: Blood from Arm, Right Updated: 01/13/21 0957     WBC 15.04 10*3/mm3      RBC 4.93 10*6/mm3      Hemoglobin 14.6 g/dL      Hematocrit 45.0 %      MCV 91.3 fL      MCH 29.6 pg      MCHC 32.4 g/dL      RDW 12.9 %      RDW-SD 43.0 fl      MPV 10.7 fL      Platelets 231 10*3/mm3      Neutrophil % 90.8 %      Lymphocyte % 4.5 %      Monocyte % 3.7 %      Eosinophil % 0.0 %      Basophil % 0.5 %      Immature Grans % 0.5 %      Neutrophils, Absolute 13.66 10*3/mm3      Lymphocytes, Absolute 0.68 10*3/mm3      Monocytes, Absolute 0.55 10*3/mm3      Eosinophils, Absolute 0.00 10*3/mm3      Basophils, Absolute 0.07 10*3/mm3      Immature Grans, Absolute 0.08 10*3/mm3      nRBC 0.0 /100 " WBC     Lactic Acid, Reflex Timer (This will reflex a repeat order 3-3:15 hours after ordered.) [[id number]] Collected: 01/13/21 0935    Specimen: Blood from Arm, Right Updated: 01/13/21 1401     Hold Tube Hold for add-ons.     Comment: Auto resulted.       TSH [[id number]]  (Normal) Collected: 01/13/21 0935    Specimen: Blood from Arm, Right Updated: 01/13/21 1401     TSH 3.330 uIU/mL     Urinalysis With Culture If Indicated - Urine, Catheter [[id number]]  (Abnormal) Collected: 01/13/21 1046    Specimen: Urine, Catheter Updated: 01/13/21 1058     Color, UA Yellow     Appearance, UA Clear     pH, UA <=5.0     Specific Gravity, UA >=1.030     Glucose, UA >=1000 mg/dL (3+)     Ketones, UA Trace     Bilirubin, UA Negative     Blood, UA Small (1+)     Protein, UA Negative     Leuk Esterase, UA Negative     Nitrite, UA Negative     Urobilinogen, UA 1.0 E.U./dL    Urinalysis, Microscopic Only - Urine, Catheter [[id number]]  (Abnormal) Collected: 01/13/21 1046    Specimen: Urine, Catheter Updated: 01/13/21 1058     RBC, UA 13-20 /HPF      WBC, UA 0-2 /HPF      Bacteria, UA None Seen /HPF      Squamous Epithelial Cells, UA 0-2 /HPF      Hyaline Casts, UA 3-6 /LPF      Methodology Automated Microscopy    Lactic Acid, Reflex [[id number]]  (Normal) Collected: 01/13/21 1436    Specimen: Blood Updated: 01/13/21 1503     Lactate 1.9 mmol/L     Basic Metabolic Panel [[id number]]  (Abnormal) Collected: 01/13/21 1436    Specimen: Blood Updated: 01/13/21 1514     Glucose 152 mg/dL      BUN 46 mg/dL      Creatinine 0.92 mg/dL      Sodium 159 mmol/L      Potassium 3.6 mmol/L      Chloride 122 mmol/L      CO2 23.1 mmol/L      Calcium 8.3 mg/dL      eGFR Non African Amer 79 mL/min/1.73      BUN/Creatinine Ratio 50.0     Anion Gap 13.9 mmol/L     Narrative:      GFR Normal >60  Chronic Kidney Disease <60  Kidney Failure <15            Imaging Results (Last 24 Hours)     Procedure Component Value Units Date/Time    XR Chest 1 View [[id number]]  Collected: 01/13/21 1055     Updated: 01/13/21 1103    Narrative:      ONE VIEW PORTABLE CHEST AT 10:06 AM     HISTORY: Hypoxemia. Aortic stenosis.     FINDINGS: The lungs are well-expanded with what appear to be skin folds  producing an appearance of pneumothorax at the right base laterally and  to a lesser extent at the left base as seen on 2 separate portable  images. There is some chronic right apical parenchymal pleural scarring.  The heart is top normal in size with sternal wires from previous cardiac  surgery.     This report was finalized on 1/13/2021 11:00 AM by Dr. Rodrigo Chavez M.D.       CT Head Without Contrast [[id number]] Collected: 01/13/21 1011     Updated: 01/13/21 1011    Narrative:      CT HEAD WITHOUT CONTRAST     HISTORY: Altered mental status, contusion left supraorbital region.     COMPARISON: CT head 01/06/2021.     FINDINGS: There is age-appropriate atrophy. There is no evidence of  hemorrhage, hydrocephalus or of abnormal extra-axial fluid. No focal  area of decreased attenuation to suggest acute infarction is identified.  Bone windows showed no evidence of a calvarial fracture. Lucency related  to the maxilla posterolaterally to the left is appreciated consistent  with an odontogenic cyst measuring approximately 12 mm in size,  unchanged. Mild to moderate vascular calcification involving carotid  siphons are noted bilaterally.       Impression:      No evidence of fracture or hemorrhage. Mild to moderate  vascular calcification and odontogenic cyst involving the maxilla  posterolaterally to the left are noted.           Radiation dose reduction techniques were utilized, including automated  exposure control and exposure modulation based on body size.                Results for orders placed in visit on 06/25/15   SCANNED - ECHOCARDIOGRAM       ECG 12 Lead    (Results Pending)   ECG 12 Lead    (Results Pending)        Assessment/Plan     Active Hospital Problems    Diagnosis POA   •  **Metabolic encephalopathy [G93.41] Yes   • Sepsis (CMS/HCC) [A41.9] Yes   • Coronary artery disease [I25.10] Yes     History of remote anterior wall myocardial infarction in 1989.     • Hypernatremia [E87.0] Yes   • Abnormal CXR [R93.89] Yes   • Hypothyroidism [E03.9] Yes   • Dementia without behavioral disturbance (CMS/[facility]) [F03.90] Yes   • ZAIN (obstructive sleep apnea) [G47.33] Yes   • HLD (hyperlipidemia) [E78.5] Yes   • Diabetes mellitus type 2, noninsulin dependent (CMS/[facility]) [E11.9] Yes     Sepsis/abnormal CXR  -Lactic 3.9 and procal 0.64. WBC 15,000.   -Unclear source of sepsis. UA unremarkable. CXR showing possible concerns for pneumothorax. Will check CT chest for further work up.   -Continue to cover with Zosyn for now. He received a dose of Vanc in the ED. Will send for MRSA screen. Await blood cultures. With dementia and AMS, aspiration pneumonia is a possibility.   -Getting IVF for this as well as hypernatremia.    Hypernatremia  -Severe with Na 162. He is dry on exam.  -Nephrology consulted. Now on 1/2NS at 250 then transition to 125 cc/hr. Appreciate their assistance.  -Monitor labs.    Metabolic encephalopathy  -Secondary to the above. Monitor mental status. CT head negative.  -NPO until passes bedside swallow. ST to see.    CAD  -Troponin 0.05 on admission. Unable to convey symptoms.  -Will trend troponins for now. No EKG done in ED. Send for STAT now.  -Resume home medications and consider Cards consult pending the above.    Dementia without behavioral disturbance  -Sounds fairly advanced. Will monitor mental status.  -Discussed with wife. He is a DNR per living will. Will change code status. She is unsure about tube feeds if they were needed. She will think about this.    DM2  -Start SSI and monitor ACHS.  -Check A1c.    *home medications not addressed at this time. Will review once completed by staff.    · I discussed the patients findings and my recommendations with patient, family, nursing  staff and Dr. De Guzman.    VTE Prophylaxis - SCDs.  Code Status - No CPR. See above. Discussed with wife. Will consider further goals of care pending his response to the above therapies.       JESSEE Marcus  [facility]  01/13/21  15:55 EST

## 2021-01-13 NOTE — NURSING NOTE
Patient came up from ED with no paperwork from nursing home, called and spoke with Atiya at [facility], she is supposed to be faxing me home med list.

## 2021-01-14 PROBLEM — J18.9 MULTIFOCAL PNEUMONIA: Status: ACTIVE | Noted: 2021-01-01

## 2021-01-14 NOTE — DISCHARGE PLACEMENT REQUEST
"Bjorn Hernandez (80 y.o. Male)     Date of Birth Social Security Number Address Home Phone MRN    1940  [facility]  [address] [facility] [address] [phone] [id number]    Zoroastrianism Marital Status          None        Admission Date Admission Type Admitting Provider Attending Provider Department, Room/Bed    1/13/21 Emergency Devyn De Guzman MD Nguyen, Minh Loc, MD [address], [address]    Discharge Date Discharge Disposition Discharge Destination                       Attending Provider: Devyn De Guzman MD    Allergies: Ativan [Lorazepam], Benzodiazepines, Diazepam, Farxiga [Dapagliflozin]    Isolation: None   Infection: None   Code Status: No CPR    Ht: 177.8 cm (70\")   Wt: 60.1 kg (132 lb 8 oz)    Admission Cmt: None   Principal Problem: Metabolic encephalopathy [G93.41]                 Active Insurance as of 1/13/2021     Primary Coverage     Payor Plan Insurance Group Employer/Plan Group    MEDICARE MEDICARE A & B      Payor Plan Address Payor Plan Phone Number Payor Plan Fax Number Effective Dates    [address] [phone]  2/1/2005 - None Entered    [facility] [id number]       Subscriber Name Subscriber Birth Date Member ID       BJORN HERNANDEZ 1940 [id number]           Secondary Coverage     Payor Plan Insurance Group Employer/Plan Group    [employer] [place] [employer] [place]      Payor Plan Address Payor Plan Phone Number Payor Plan Fax Number Effective Dates    [address] [employer] [place] [address] [phone]  1/1/2011 - None Entered    [place] [address]       Subscriber Name Subscriber Birth Date Member ID       BJORN HERNANDEZ 1940 [id number]                 Emergency Contacts      (Rel.) Home Phone Work Phone Mobile Phone    Sandra Hernandez (Spouse) [phone] -- [phone]    Dom Hernandez (Son) [phone] -- [phone]          "

## 2021-01-14 NOTE — CONSULTS
"Adult Nutrition  Assessment/PES    Patient Name:  Anthony Hernandez  YOB: 1940  MRN: [id number]  Admit Date:  1/13/2021    Assessment Date:  1/14/2021    Comments:  Nutrition consult d/t MST score of 2 per nurse admission screen, chart skin report.  Patient with stage II pressure injury to coccyx.  Admitted with AMS, metabolic encephalopathy.  From NH.  Advanced dementia, d/o x 3.    Patient currently NPO.  Awaiting SLP evaluation.    RD to follow up as diet is advanced.    RD working remotely due to covid-19 pandemic. Assessment completed based on review of electronic medical record. RD may be reached via secure chat or email.     Reason for Assessment     Row Name 01/14/21 1147          Reason for Assessment    Reason For Assessment  identified at risk by screening criteria;nurse/nurse practitioner consult     Diagnosis  neurologic conditions;diabetes diagnosis/complications;cardiac disease;pulmonary disease;other (see comments);cancer diagnosis/related complications DM2, CAD, dementia, HLD, HTN, ZAIN, MI, AV sclerosis, OA, PVD, skin CA, spinal stenosis; adm with met encephalopathy, AMS     Identified At Risk by Screening Criteria  large or nonhealing wound, burn or pressure injury         Nutrition/Diet History     Row Name 01/14/21 1151          Nutrition/Diet History    Typical Food/Fluid Intake  currently NPO, needs SLP evaluation         Anthropometrics     Row Name 01/14/21 1151          Anthropometrics    Height  177.8 cm (70\")        Admit Weight    Admit Weight  -- 132# 1/14        Ideal Body Weight (IBW)    Ideal Body Weight (IBW) (kg)  76.48        Body Mass Index (BMI)    BMI Assessment  BMI 18.5-24.9: normal 18.97         Labs/Tests/Procedures/Meds     Row Name 01/14/21 1152          Labs/Procedures/Meds    Lab Results Reviewed  reviewed, pertinent     Lab Results Comments  Gluc, Na, K, BUN, Ca, Alb, WBC        Diagnostic Tests/Procedures    Diagnostic Test/Procedure Reviewed  reviewed, " "pertinent        Medications    Pertinent Medications Reviewed  reviewed, pertinent     Pertinent Medications Comments  humalog, zosyn, D5W         Physical Findings     Row Name 01/14/21 1153          Physical Findings    Skin  pressure injury B=10, st II coccyx         Estimated/Assessed Needs     Row Name 01/14/21 1154 01/14/21 1151       Calculation Measurements    Weight Used For Calculations  60.1 kg (132 lb 7.9 oz)  --    Height  --  177.8 cm (70\")       Estimated/Assessed Needs       KCAL/KG    KCAL/KG  30 Kcal/Kg (kcal);35 Kcal/Kg (kcal)  --    30 Kcal/Kg (kcal)  1803  --    35 Kcal/Kg (kcal)  2103.5  --       Protein Requirements    Weight Used For Protein Calculations  60.1 kg (132 lb 7.9 oz)  --    Est Protein Requirement Amount (gms/kg); used 1.2-1.5 g/kg  72-90  --       Fluid Requirements    Fluid Requirements (mL/day)  1800  --        Nutrition Prescription Ordered     Row Name 01/14/21 1154          Nutrition Prescription PO    Current PO Diet  NPO         Problem/Interventions:  Problem 1     Row Name 01/14/21 1154          Nutrition Diagnoses Problem 1    Problem 1  Inadequate Intake/Infusion     Inadequate Intake Type  Oral     Macronutrient  Kcal;Protein     Etiology (related to)  MNT for Treatment/Condition     Signs/Symptoms (evidenced by)  NPO;SLP/Swallow eval     Swallow eval status  Pending     Type of SLP Evaluation  Bedside         Intervention Goal     Row Name 01/14/21 1155          Intervention Goal    General  Maintain nutrition;Reduce/improve symptoms;Disease management/therapy     PO  Initiate feeding     Weight  Maintain weight         Nutrition Intervention     Row Name 01/14/21 1155          Nutrition Intervention    RD/Tech Action  Follow Tx progress;Care plan reviewd;Await begin PO         Education/Evaluation     Row Name 01/14/21 1155          Education    Education  Will Instruct as appropriate        Monitor/Evaluation    Monitor  Per protocol;I&O;Pertinent labs;Weight;Skin " status;Symptoms           Electronically signed by:  Lata Jordan RD  01/14/21 11:55 EST

## 2021-01-14 NOTE — SIGNIFICANT NOTE
Called and spoke with patient's wife, Sandra Hernandez. Informed her of his continued NPO status and poor long term prognosis. She will discuss things further with her step son tonight regarding wishes for feeding tube, etc. She confirms DNR status. She is open to speaking with palliative care at this time. She would like them to contact his son first and then her so they can make an informed decision about what step is next. If no tube feed and goals of comfort, [place] [facility]

## 2021-01-14 NOTE — PROGRESS NOTES
"Pharmacokinetic Consult - Zosyn Dosing  Anthony Hernandez is a 80 y.o. male who is on day 2 pharmacy to dose Zosyn for sepsis per JESSEE Son' request.   Other antimicrobials: none, s/p IV Vancomycin x1 dose in ER 1/13    HPI:presented to ER from [facility] for worsening mental status past 2 days.  PMH includes Hx of DM2, CAD, dementia, HTN, HLD and ZAIN.  H&P notes CXR showing possible concerns for pneumothorax.  Nephrology is following for hypernatremia.      Relevant clinical data and objective history reviewed:  177.8 cm (70\")  60.1 kg (132 lb 8 oz)  Body mass index is 19.01 kg/m².     He has a past medical history of Acute myocardial infarction (CMS/HCC), Anesthesia complication, Aortic valve sclerosis, Coronary artery disease, Diabetes mellitus (CMS/HCC), Disease of thyroid gland, Erectile dysfunction, Fall, Hearing difficulty of both ears, Heart attack (CMS/HCC), Heart attack (CMS/HCC), Heart murmur, Hyperlipidemia, Intermittent claudication (CMS/HCC), Low back pain, Osteoarthritis, Peripheral neuropathy, Peripheral vascular disease (CMS/HCC), Skin cancer, Sleep apnea, and Spinal stenosis.    Allergies as of 01/13/2021 - Reviewed 01/13/2021   Allergen Reaction Noted   • Ativan [lorazepam] Hallucinations 07/11/2017   • Benzodiazepines Hallucinations 07/11/2017   • Diazepam Hallucinations 07/11/2017   • Farxiga [dapagliflozin] Other (See Comments) 04/01/2019     Vital Signs (last 24 hours)       01/13 0700  -  01/14 0659 01/14 0700  -  01/14 0810   Most Recent    Temp (°F) 97.7 -  98.1       97.7 (36.5)    Heart Rate 64 -  90       73    Resp 12 -  16       16    BP 88/77 -  149/80       --  Comment: pt refused    SpO2 (%) 90 -  100       94        Estimated Creatinine Clearance: 55 mL/min (by C-G formula based on SCr of 0.91 mg/dL).  Results from last 7 days   Lab Units 01/14/21  0557 01/13/21 2012 01/13/21  1436   CREATININE mg/dL 0.91 0.93 0.92     Results from last 7 days   Lab Units 01/14/21  0557 " 21  0935   WBC 10*3/mm3 12.65* 15.04*     Baseline culture/source/susceptibility:    BC x2 pending   MRSA nares swab: not detected   RVP: not detected     Imagin/13 CXR:  FINDINGS: The lungs are well-expanded with what appear to be skin folds   producing an appearance of pneumothorax at the right base laterally and   to a lesser extent at the left base as seen on 2 separate portable   images. There is some chronic right apical parenchymal pleural scarring.   The heart is top normal in size with sternal wires from previous cardiac   surgery.     Assessment/Plan  Continue Zosyn 3.375 gram IV q8h EI as per [facility] dosing guidelines as estimated CrCl >20mL/min at this time    Pharmacy will discontinue the Pharmacy to Dose consult for Zosyn at this time. Renal function will continue to be monitored and dosing adjustments will be made by pharmacy based on renal function if necessary    Thanks, Qamar Alarcon, PharmD, BCPS

## 2021-01-14 NOTE — SIGNIFICANT NOTE
Transport present to take patient to CT, RN reports pt not alert or appropriate for swallow eval will f/u 1/15   01/14/21 0925   OTHER   Discipline speech language pathologist

## 2021-01-14 NOTE — PROGRESS NOTES
Discharge Planning Assessment  [facility]     Patient Name: Anthony Hernandez  MRN: [id number]  Today's Date: 1/14/2021    Admit Date: 1/13/2021    Discharge Needs Assessment     Row Name 01/14/21 0950       Living Environment    Lives With  facility resident    Current Living Arrangements  extended care facility    Provides Primary Care For  no one    Family Caregiver if Needed  spouse    Quality of Family Relationships  helpful;involved    Able to Return to Prior Arrangements  yes    Living Arrangement Comments  [facility]       Resource/Environmental Concerns    Resource/Environmental Concerns  none       Transition Planning    Patient/Family Anticipates Transition to  long-term care facility    Patient/Family Anticipated Services at Transition  none    Transportation Anticipated  health plan transportation       Discharge Needs Assessment    Equipment Currently Used at Home  wheelchair    Anticipated Changes Related to Illness  none    Equipment Needed After Discharge  none    Discharge Facility/Level of Care Needs  nursing facility, intermediate    Provided Post Acute Provider Quality & Resource List?  Refused    Refused Quality and Resource List Comment  wife declined    Discharge Coordination/Progress  IC level at [facility]        Discharge Plan     Row Name 01/14/21 0951       Plan    Plan  [facility]; IC level of care; Will need ambulance at d/c.    Patient/Family in Agreement with Plan  yes    Plan Comments  Checked IMM. Spoke with pt’s wife Sandra Hernandez [phone]. Confirmed face sheet correct. Explained role of CCP. Pt is a resident at [facility]. Per Alexa pt is IC level of care with bed hold. Per spouse she wants patient to return to [facility] via ambulance. Packet on chart. CCP to follow…ARMANI Pappas        Continued Care and Services - Admitted Since 1/13/2021     Destination     Service Provider Request Status Selected Services Address Phone Fax Patient Preferred     [facility]  Pending - Request Sent N/A [address] [facility] [id number] [phone] --                Demographic Summary     Row Name 01/14/21 0948       General Information    Admission Type  inpatient    Arrived From  home    Required Notices Provided  Important Message from Medicare    Reason for Consult  discharge planning    Preferred Language  English     Used During This Interaction  no        Functional Status     Row Name 01/14/21 0949       Functional Status, IADL    Medications  completely dependent    Meal Preparation  completely dependent    Housekeeping  completely dependent    Laundry  completely dependent    Shopping  completely dependent        Psychosocial    No documentation.       Abuse/Neglect    No documentation.       Legal    No documentation.       Substance Abuse    No documentation.       Patient Forms    No documentation.           BARBARA Betts

## 2021-01-14 NOTE — PLAN OF CARE
Goal Outcome Evaluation:         Pt uncooperative with care. Pulling monitor and purewick off. Incontinence care as needed. Pt resting quietly as long as care is not being given. Pt turns self in bed frequently but also assisted per staff. Waffle boots in place. Oral care given when patient does not refuse.

## 2021-01-14 NOTE — PROGRESS NOTES
Name: Anthony Hernandez ADMIT: 2021   : 1940  PCP: Chris Dahl MD    MRN: [id number] LOS: 1 days   AGE/SEX: 80 y.o. male  ROOM: [facility]     Subjective   Subjective   Lying in bed. More alert today. Speech unintelligible. ROS unreliable. Nursing states he is fine if he is not bothered. Not agitated. Still not cleared for any oral intake.      Objective   Objective   Vital Signs  Temp:  [97.7 °F (36.5 °C)-98.1 °F (36.7 °C)] 97.7 °F (36.5 °C)  Heart Rate:  [64-90] 76  Resp:  [12-18] 18  BP: ()/(60-92) 127/60  SpO2:  [90 %-99 %] 94 %  on   ;   Device (Oxygen Therapy): room air  Body mass index is 19.01 kg/m².     Physical Exam  Vitals signs and nursing note reviewed.   Constitutional:       Appearance: He is ill-appearing (chronically, cachetic, frail).   HENT:      Head: Normocephalic.      Right Ear: External ear normal.      Left Ear: External ear normal.      Nose: Nose normal.      Mouth/Throat:      Comments: Dry with poor oral care. Dried secretions on tongue.  Cardiovascular:      Rate and Rhythm: Normal rate and regular rhythm.      Pulses: Normal pulses.      Heart sounds: Murmur present.   Pulmonary:      Effort: Pulmonary effort is normal. No respiratory distress.      Comments: Diminished. Poor inspiratory effort.  Abdominal:      General: There is no distension.      Palpations: Abdomen is soft.      Tenderness: There is no abdominal tenderness.      Comments: Scaphoid abdomen.   Musculoskeletal:         General: No swelling.      Comments: Resisted ROM upper extremities.    Skin:     General: Skin is dry.      Coloration: Skin is pale.      Findings: Bruising present.   Neurological:      Mental Status: He is disoriented.      Motor: Weakness present.      Comments: Garbled unintelligible speech.   Psychiatric:      Comments: More alert but flat. Confused.     Results Review:       I reviewed the patient's new clinical results.  Results from last 7 days   Lab Units 21  8414  01/13/21  0935   WBC 10*3/mm3 12.65* 15.04*   HEMOGLOBIN g/dL 13.5 14.6   PLATELETS 10*3/mm3 199 231     Results from last 7 days   Lab Units 01/14/21  0557 01/13/21 2012 01/13/21 1436 01/13/21  0935   SODIUM mmol/L 157*  158* 156* 159* 162*   POTASSIUM mmol/L 3.3*  3.4* 3.3* 3.6 3.6   CHLORIDE mmol/L 119*  120* 118* 122* 115*   CO2 mmol/L 21.3*  24.1 26.0 23.1 31.1*   BUN mg/dL 40*  41* 44* 46* 47*   CREATININE mg/dL 1.04  0.91 0.93 0.92 1.08   GLUCOSE mg/dL 129*  131* 150* 152* 178*   Estimated Creatinine Clearance: 55 mL/min (by C-G formula based on SCr of 0.91 mg/dL).  Results from last 7 days   Lab Units 01/14/21  0557 01/13/21  0935   ALBUMIN g/dL 3.00* 3.80   BILIRUBIN mg/dL 0.8 0.6   ALK PHOS U/L 114 136*   AST (SGOT) U/L 39 19   ALT (SGPT) U/L 27 25     Results from last 7 days   Lab Units 01/14/21  0557 01/13/21 2012 01/13/21 1436 01/13/21  0935   CALCIUM mg/dL 8.7  8.5* 8.3* 8.3* 9.5   ALBUMIN g/dL 3.00*  --   --  3.80     Results from last 7 days   Lab Units 01/13/21  1436 01/13/21  0935   PROCALCITONIN ng/mL  --  0.64*   LACTATE mmol/L 1.9 3.9*     Hemoglobin A1C   Date/Time Value Ref Range Status   01/13/2021 0935 6.58 (H) 4.80 - 5.60 % Final     Glucose   Date/Time Value Ref Range Status   01/14/2021 1114 166 (H) 70 - 130 mg/dL Final   01/13/2021 1928 134 (H) 70 - 130 mg/dL Final   01/13/2021 1646 148 (H) 70 - 130 mg/dL Final       insulin lispro, 0-7 Units, Subcutaneous, TID AC  piperacillin-tazobactam, 3.375 g, Intravenous, Q8H  sodium chloride, 10 mL, Intravenous, Q12H      dextrose, 125 mL/hr, Last Rate: 125 mL/hr (01/14/21 0836)    NPO Diet       Assessment/Plan     Active Hospital Problems    Diagnosis  POA   • **Metabolic encephalopathy [G93.41]  Yes   • Multifocal pneumonia [J18.9]  Yes   • Sepsis (CMS/HCC) [A41.9]  Yes   • Coronary artery disease [I25.10]  Yes   • Hypernatremia [E87.0]  Yes   • Abnormal CXR [R93.89]  Yes   • Hypothyroidism [E03.9]  Yes   • Dementia without  behavioral disturbance (CMS/HCC) [F03.90]  Yes   • ZAIN (obstructive sleep apnea) [G47.33]  Yes   • HLD (hyperlipidemia) [E78.5]  Yes   • Diabetes mellitus type 2, noninsulin dependent (CMS/[facility]) [E11.9]  Yes      Resolved Hospital Problems   No resolved problems to display.     Sepsis/multifocal pneumonia  -Lactic 3.9 and procal 0.64. WBC 15,000 on admission.   -Lactic normalized. WBC better.  -CT chest 1/14 did not show pneumothorax, but does have multifocal pneumonia. Continue Zosyn therapy. MRSA negative. Blood cultures negative.  -Getting aggressive IVF for this as well as hypernatremia.     Hypernatremia  -Severe with Na 162 on admission. Received 1/2 NS yesterday with minimal improvement. Now on D5W. Renal managing.   -Monitor labs.     Metabolic encephalopathy  -Secondary to the above. Monitor mental status. CT head negative.  -Not cleared for PO intake. Will discuss with wife about goals of care.     CAD  -Troponin 0.05 on admission. Unable to convey symptoms.  -Troponins trending down and equivocal. EKG no acute changes. Would be poor candidate for any work up and currently not taking PO. Will monitor for now.  -Resume home meds when able.      Dementia without behavioral disturbance  -Sounds fairly advanced. Will monitor mental status.  -Think palliative consult would be best next step in this case.      DM2  -Continue SSI and monitor ACHS.  -A1c 6.58%.    Constipation  -Found on CT chest.   -Will give dulcolax suppository x 1. Start oral regimen when able. He is very dry which is contributing. Monitor.       · I discussed the patients findings and my recommendations with patient, family, nursing staff and Dr. De Guzman.     VTE Prophylaxis - SCDs.  Code Status - No CPR. See above. Discussed with wife.   Disposition - Anticipate discharge TBD.      JESSEE Marcus  [facility]  01/14/21  13:20 EST

## 2021-01-14 NOTE — PROGRESS NOTES
"   LOS: 1 day    Patient Care Team:  Chris Dahl MD as PCP - General (Family Medicine)    Chief Complaint:    Chief Complaint   Patient presents with   • Altered Mental Status     Follow-up hypernatremia  Subjective     Interval History:   The patient is obtunded, when trying to talk to him he mumbles      Review of Systems:   Not obtained    Objective     Vital Signs  Temp:  [97.7 °F (36.5 °C)-98.1 °F (36.7 °C)] 97.7 °F (36.5 °C)  Heart Rate:  [64-90] 73  Resp:  [12-16] 16  BP: ()/(70-92) 133/92    Flowsheet Rows      First Filed Value   Admission Height  177 cm (69.69\") Documented at 01/13/2021 1050   Admission Weight  72.6 kg (160 lb) Documented at 01/13/2021 1050          No intake/output data recorded.  I/O last 3 completed shifts:  In: 3342 [I.V.:1292; IV Piggyback:2050]  Out: 100 [Urine:100]    Intake/Output Summary (Last 24 hours) at 1/14/2021 0818  Last data filed at 1/14/2021 0209  Gross per 24 hour   Intake 3342 ml   Output 100 ml   Net 3242 ml       Physical Exam:  General Appearance: Chronically ill, very frail, mumbling when I talk to him unable to answer any question  Skin: warm and dry  HEENT: pupils round and reactive to light, oral mucosa very dry, nonicteric sclera  Neck: supple, no JVD, trachea midline  Lungs: Bilateral rhonchi, unlabored breathing effort  Heart: RRR, normal S1 and S2, no S3, no rub  Abdomen: soft, no guarding, normoactive bowels  : no palpable bladder,  Extremities: no edema, cyanosis or clubbing  Neuro: Unable to assess      Results Review:    Results from last 7 days   Lab Units 01/14/21  0557 01/13/21 2012 01/13/21  1436 01/13/21  0935   SODIUM mmol/L 158* 156* 159* 162*   POTASSIUM mmol/L 3.4* 3.3* 3.6 3.6   CHLORIDE mmol/L 120* 118* 122* 115*   CO2 mmol/L 24.1 26.0 23.1 31.1*   BUN mg/dL 41* 44* 46* 47*   CREATININE mg/dL 0.91 0.93 0.92 1.08   CALCIUM mg/dL 8.5* 8.3* 8.3* 9.5   BILIRUBIN mg/dL 0.8  --   --  0.6   ALK PHOS U/L 114  --   --  136*   ALT (SGPT) " U/L 27  --   --  25   AST (SGOT) U/L 39  --   --  19   GLUCOSE mg/dL 131* 150* 152* 178*       Estimated Creatinine Clearance: 55 mL/min (by C-G formula based on SCr of 0.91 mg/dL).                Results from last 7 days   Lab Units 01/14/21  0557 01/13/21  0935   WBC 10*3/mm3 12.65* 15.04*   HEMOGLOBIN g/dL 13.5 14.6   PLATELETS 10*3/mm3 199 231       Results from last 7 days   Lab Units 01/13/21  0935   INR  1.48*         Imaging Results (Last 24 Hours)     Procedure Component Value Units Date/Time    CT Head Without Contrast [[id number]] Collected: 01/13/21 1011     Updated: 01/13/21 1647    Narrative:      CT HEAD WITHOUT CONTRAST     HISTORY: Altered mental status, contusion left supraorbital region.     COMPARISON: CT head 01/06/2021.     FINDINGS: There is age-appropriate atrophy. There is no evidence of  hemorrhage, hydrocephalus or of abnormal extra-axial fluid. No focal  area of decreased attenuation to suggest acute infarction is identified.  Bone windows showed no evidence of a calvarial fracture. Lucency related  to the maxilla posterolaterally to the left is appreciated consistent  with an odontogenic cyst measuring approximately 12 mm in size,  unchanged. Mild to moderate vascular calcification involving carotid  siphons are noted bilaterally.       Impression:      No evidence of fracture or hemorrhage. There is mild to  moderate vascular calcification as well as a a small odontogenic cyst  involving the maxilla posterolaterally noted as described above.           Radiation dose reduction techniques were utilized, including automated  exposure control and exposure modulation based on body size.     This report was finalized on 1/13/2021 4:44 PM by Dr. Meet Keane M.D.       XR Chest 1 View [[id number]] Collected: 01/13/21 1055     Updated: 01/13/21 1103    Narrative:      ONE VIEW PORTABLE CHEST AT 10:06 AM     HISTORY: Hypoxemia. Aortic stenosis.     FINDINGS: The lungs are well-expanded with  what appear to be skin folds  producing an appearance of pneumothorax at the right base laterally and  to a lesser extent at the left base as seen on 2 separate portable  images. There is some chronic right apical parenchymal pleural scarring.  The heart is top normal in size with sternal wires from previous cardiac  surgery.     This report was finalized on 1/13/2021 11:00 AM by Dr. Rodrigo Chavez M.D.           insulin lispro, 0-7 Units, Subcutaneous, TID AC  piperacillin-tazobactam, 3.375 g, Intravenous, Q8H  sodium chloride, 10 mL, Intravenous, Q12H      sodium chloride, 125 mL/hr, Last Rate: 125 mL/hr (01/14/21 0207)        Medication Review:   Current Facility-Administered Medications   Medication Dose Route Frequency Provider Last Rate Last Admin   • acetaminophen (TYLENOL) tablet 650 mg  650 mg Oral Q4H PRN Susanna Son APRN        Or   • acetaminophen (TYLENOL) 160 MG/5ML solution 650 mg  650 mg Oral Q4H PRN Susanna Son APRN        Or   • acetaminophen (TYLENOL) suppository 650 mg  650 mg Rectal Q4H PRN Susanna Son APRN       • dextrose (D50W) 25 g/ 50mL Intravenous Solution 25 g  25 g Intravenous Q15 Min PRN Susanna Son APRN       • dextrose (GLUTOSE) oral gel 15 g  15 g Oral Q15 Min PRN Susanna Son APRN       • glucagon (human recombinant) (GLUCAGEN DIAGNOSTIC) injection 1 mg  1 mg Subcutaneous Q15 Min PRN Susanna Son APRN       • insulin lispro (humaLOG, ADMELOG) injection 0-7 Units  0-7 Units Subcutaneous TID AC Susanna Son APRN       • nitroglycerin (NITROSTAT) SL tablet 0.4 mg  0.4 mg Sublingual Q5 Min PRN Susanna Son APRN       • OLANZapine (zyPREXA) injection 5 mg  5 mg Intramuscular Once PRN Susanna Son APRN       • ondansetron (ZOFRAN) injection 4 mg  4 mg Intravenous Q6H PRN Susanna Son APRN       • piperacillin-tazobactam (ZOSYN) 3.375 g in iso-osmotic dextrose 50 ml (premix)  3.375 g Intravenous Q8H Susanna Son APRN   3.375 g at 01/14/21 0215   •  sodium chloride 0.45 % infusion  125 mL/hr Intravenous Continuous ErbMary renner  mL/hr at 01/14/21 0207 125 mL/hr at 01/14/21 0207   • sodium chloride 0.9 % flush 10 mL  10 mL Intravenous PRN Mike Manning MD       • sodium chloride 0.9 % flush 10 mL  10 mL Intravenous Q12H Susanna Son, JESSEE       • sodium chloride 0.9 % flush 10 mL  10 mL Intravenous PRN Susanna Son APRN           Assessment/Plan   1.  Severe hypernatremia associated with dehydration, sodium is 158 down from 162 currently on one half normal saline fluid.  His free water deficit is at least 6 to 6 L  2.  Dementia, he had significant altered mental status      Plan:  1.  Change IV fluid to D5W to run at 125 cc/h  2.  Recheck sodium at 4 PM and make more adjustment with the IV fluid as needed the goal is to allow the sodium to drop by no more than 10 mEq per 24 hours  3.  Surveillance labs        Elmer Obando MD  01/14/21  08:18 EST

## 2021-01-14 NOTE — PROGRESS NOTES
Continued Stay Note  [facility]     Patient Name: Anthony Hernandez  MRN: [id number]  Today's Date: 1/14/2021    Admit Date: 1/13/2021    Discharge Plan     Row Name 01/14/21 1018       Plan    Plan Comments  Call from Alexa pt will need new COVID test 72 hours prior to d/c.    Row Name 01/14/21 0951       Plan    Plan  [facility]; IC level of care; Will need ambulance at d/c.    Patient/Family in Agreement with Plan  yes    Plan Comments  Checked IMM. Spoke with pt’s wife Sandra Hernandez [phone]. Confirmed face sheet correct. Explained role of CCP. Pt is a resident at [facility]. Per Alexa pt is IC level of care with bed hold. Per spouse she wants patient to return to [facility] via ambulance. Packet on chart. CCP to follow…ARMANI Pappas        Discharge Codes    No documentation.             BARBARA Betts

## 2021-01-14 NOTE — PLAN OF CARE
Goal Outcome Evaluation:  Plan of Care Reviewed With: patient, family     Outcome Summary: Pt IVF changed to D5W at 125nl/hr.  NPO.  Pt is not appropriate or alert enough to work with SLP.  Sodium coming down a bit.  Family called about what course of action and that Palliative would be appropriate for pt.  Family thinking aobut it.  VSS.  Will cont to monitor.

## 2021-01-15 NOTE — SIGNIFICANT NOTE
01/15/21 0902   OTHER   Discipline speech language pathologist   Rehab Time/Intention   Session Not Performed other (see comments)  (SLP discussed patient care with RN. Swallow assessment is not indicated at this time. Will await family input for GOC. Plan to check back Monday unless new orders received.)

## 2021-01-15 NOTE — CONSULTS
Purpose of the visit was to evaluate for: goals of care/advanced care planning, support for patient/family, hospice referral/discussion, pain/symptom management, withdrawal of interventions, transfer to comfort care bed/unit and comfort care. Spoke with RN as well as family and discussed palliative care, goals of care, care options, resuscitation status, [facility] bed status and discharge options.      Assessment:  Patient is palliative care appropriate for inpatient care given advanced alzheimers dementia, respiratory failure, heart failure, very agitated and confused. PPS 30%.     Recommendations/Plan: transfer to [facility] for palliative care and [facility] evaluation for community based services    Other Comments: Palliative Care spoke with spouse and son by phone. They both agree to transition to palliative care for comfort care. Also to meet with [facility] regarding scattered bed or transfer back to nursing home for hospice care. They both agree to not put in a feeding tube with his advanced dementia.      Message to Dr. De Guzman.

## 2021-01-15 NOTE — NURSING NOTE
Patient attempted to get out of bed, patient was sitting on bed with the feet dangling on floor when nurse entered patient's room. Patient removed IV, heart monitor and gown. This nurse tried 2x IV insertion but failed. Left a voicemail for IV team. Potassium run, D5W and Zosyn on pause. Reorient patient on situation. Still agitated at this time. Will continue to monitor.

## 2021-01-15 NOTE — PROGRESS NOTES
Name: Anthony Hernandez ADMIT: 2021   : 1940  PCP: Chris Dahl MD    MRN: [id number] LOS: 2 days   AGE/SEX: 80 y.o. male  ROOM: [facility]     Subjective   Subjective   Lying in bed. Not very alert today. More sedated appearing, but has not received any medication. Did have some agitation earlier. ROS unreliable. Palliative to speak with family.     Objective   Objective   Vital Signs  Temp:  [96.9 °F (36.1 °C)-98.4 °F (36.9 °C)] 97.8 °F (36.6 °C)  Heart Rate:  [62-85] 62  Resp:  [18] 18  BP: (109-138)/(58-81) 138/81  SpO2:  [92 %-94 %] 94 %  on   ;   Device (Oxygen Therapy): room air  Body mass index is 19.94 kg/m².     Physical Exam  Vitals signs and nursing note reviewed.   Constitutional:       Appearance: He is ill-appearing (chronically, cachetic, frail).   HENT:      Head: Normocephalic.      Right Ear: External ear normal.      Left Ear: External ear normal.      Nose: Nose normal.      Mouth/Throat:      Comments: Dry with poor oral care. Dried secretions on tongue.  Cardiovascular:      Rate and Rhythm: Normal rate and regular rhythm.      Pulses: Normal pulses.      Heart sounds: Murmur present.   Pulmonary:      Effort: Pulmonary effort is normal. No respiratory distress.      Comments: Diminished. Poor inspiratory effort.  Abdominal:      General: There is no distension.      Palpations: Abdomen is soft.      Tenderness: There is no abdominal tenderness.      Comments: Scaphoid abdomen.   Musculoskeletal:         General: No swelling.      Comments: Resisted ROM upper extremities.    Skin:     General: Skin is dry.      Coloration: Skin is pale.      Findings: Bruising present.   Neurological:      Mental Status: He is disoriented.      Motor: Weakness present.      Comments: Nonverbal today.  Psychiatric:      Comments: withdrawn. Quiet.    Results Review:       I reviewed the patient's new clinical results.  Results from last 7 days   Lab Units 01/15/21  0631 21  0557  01/13/21  0935   WBC 10*3/mm3 9.76 12.65* 15.04*   HEMOGLOBIN g/dL 11.9* 13.5 14.6   PLATELETS 10*3/mm3 157 199 231     Results from last 7 days   Lab Units 01/15/21  0631 01/15/21  0021 01/14/21  1805 01/14/21  1325   SODIUM mmol/L 151* 152* 154* 154*   POTASSIUM mmol/L 3.1* 2.9* 3.2* 3.3*   CHLORIDE mmol/L 115* 116* 117* 117*   CO2 mmol/L 27.3 26.6 26.7 26.3   BUN mg/dL 26* 29* 33* 36*   CREATININE mg/dL 0.76 0.80 0.93 0.96   GLUCOSE mg/dL 169* 187* 166* 152*   Estimated Creatinine Clearance: 65.7 mL/min (by C-G formula based on SCr of 0.76 mg/dL).  Results from last 7 days   Lab Units 01/15/21  0631 01/14/21  0557 01/13/21  0935   ALBUMIN g/dL 2.60* 3.00* 3.80   BILIRUBIN mg/dL  --  0.8 0.6   ALK PHOS U/L  --  114 136*   AST (SGOT) U/L  --  39 19   ALT (SGPT) U/L  --  27 25     Results from last 7 days   Lab Units 01/15/21  0631 01/15/21  0021 01/14/21  1805 01/14/21  1325 01/14/21  0557  01/13/21  0935   CALCIUM mg/dL 8.2* 7.9* 8.5* 8.5* 8.7  8.5*   < > 9.5   ALBUMIN g/dL 2.60*  --   --   --  3.00*  --  3.80   MAGNESIUM mg/dL 2.3  --   --   --   --   --   --    PHOSPHORUS mg/dL 2.1*  --   --   --   --   --   --     < > = values in this interval not displayed.     Results from last 7 days   Lab Units 01/15/21  0631 01/13/21  1436 01/13/21  0935   PROCALCITONIN ng/mL 0.28*  --  0.64*   LACTATE mmol/L  --  1.9 3.9*     Hemoglobin A1C   Date/Time Value Ref Range Status   01/13/2021 0935 6.58 (H) 4.80 - 5.60 % Final     Glucose   Date/Time Value Ref Range Status   01/15/2021 0541 189 (H) 70 - 130 mg/dL Final   01/14/2021 2017 147 (H) 70 - 130 mg/dL Final   01/14/2021 1620 174 (H) 70 - 130 mg/dL Final   01/14/2021 1114 166 (H) 70 - 130 mg/dL Final   01/13/2021 1928 134 (H) 70 - 130 mg/dL Final   01/13/2021 1646 148 (H) 70 - 130 mg/dL Final       insulin lispro, 0-7 Units, Subcutaneous, TID AC  piperacillin-tazobactam, 3.375 g, Intravenous, Q8H  sodium chloride, 10 mL, Intravenous, Q12H      dextrose, 150 mL/hr, Last  Rate: 125 mL/hr (01/15/21 0630)    NPO Diet       Assessment/Plan     Active Hospital Problems    Diagnosis  POA   • **Metabolic encephalopathy [G93.41]  Yes   • Multifocal pneumonia [J18.9]  Yes   • Sepsis (CMS/HCC) [A41.9]  Yes   • Coronary artery disease [I25.10]  Yes   • Hypernatremia [E87.0]  Yes   • Abnormal CXR [R93.89]  Yes   • Hypothyroidism [E03.9]  Yes   • Dementia without behavioral disturbance (CMS/HCC) [F03.90]  Yes   • ZAIN (obstructive sleep apnea) [G47.33]  Yes   • HLD (hyperlipidemia) [E78.5]  Yes   • Diabetes mellitus type 2, noninsulin dependent (CMS/HCC) [E11.9]  Yes      Resolved Hospital Problems   No resolved problems to display.     Sepsis/multifocal pneumonia  -Lactic 3.9 and procal 0.64. WBC 15,000 on admission.   -Lactic normalized. WBC normalized.  -CT chest 1/14 did not show pneumothorax, but does have multifocal pneumonia. Continue Zosyn therapy. MRSA negative. Blood cultures negative.  -Getting aggressive IVF for this as well as hypernatremia.     Hypernatremia  -Severe with Na 162 on admission. Minimal improvement with aggressive fluids. Renal managing and recommends palliative.  -Monitor labs.     Metabolic encephalopathy  -Secondary to the above. Monitor mental status. CT head negative.  -Not cleared for PO intake. Spoke with wife yesterday and they were discussing feeding tube. Educated on poor prognosis and initiated palliative consult.     CAD  -Troponin 0.05 on admission. Unable to convey symptoms.  -Troponins trending down and equivocal. EKG no acute changes. Would be poor candidate for any work up and currently not taking PO. Will monitor for now.  -Resume home meds when able.      Dementia without behavioral disturbance  -Sounds fairly advanced. Will monitor mental status.  -Think palliative consult would be best next step in this case.      DM2  -Continue SSI and monitor ACHS.  -A1c 6.58%.     Constipation  -Found on CT chest.   -Suppository x 1 with stool documented.        · I discussed the patients findings and my recommendations with patient, family, nursing staff and Dr. De Guzman.    Await palliative discussion but anticipate this is most appropriate. He is declining.     VTE Prophylaxis - SCDs.  Code Status - No CPR. See above. Discussed with wife.   Disposition - Anticipate discharge TBD.      JESSEE Marcus  [facility]  01/15/21  11:01 EST     Addendum: Palliative care spoke with family and agreeable to transfer to [facility]. Order set placed by Dr. De Guzman.

## 2021-01-15 NOTE — PROGRESS NOTES
"   LOS: 2 days    Patient Care Team:  Chris Dahl MD as PCP - General (Family Medicine)    Chief Complaint:    Chief Complaint   Patient presents with   • Altered Mental Status     Follow-up hypernatremia  Subjective     Interval History:   The patient is obtunded, poorly responsive      Review of Systems:   Not obtained    Objective     Vital Signs  Temp:  [96.9 °F (36.1 °C)-98.4 °F (36.9 °C)] 97.8 °F (36.6 °C)  Heart Rate:  [62-85] 62  Resp:  [18] 18  BP: (109-138)/(58-81) 138/81    Flowsheet Rows      First Filed Value   Admission Height  177 cm (69.69\") Documented at 01/13/2021 1050   Admission Weight  72.6 kg (160 lb) Documented at 01/13/2021 1050          No intake/output data recorded.  I/O last 3 completed shifts:  In: 1292 [I.V.:1292]  Out: 100 [Urine:100]  No intake or output data in the 24 hours ending 01/15/21 0959    Physical Exam:  General Appearance: Chronically ill, very frail, poorly responsive   skin: warm and dry  HEENT: pupils round and reactive to light, oral mucosa very dry, nonicteric sclera  Neck: supple, no JVD, trachea midline  Lungs: Bilateral rhonchi, unlabored breathing effort  Heart: RRR, normal S1 and S2, no S3, no rub  Abdomen: soft, no guarding, normoactive bowels  : no palpable bladder,  Extremities: no edema, cyanosis or clubbing  Neuro: Unable to assess      Results Review:    Results from last 7 days   Lab Units 01/15/21  0631 01/15/21  0021 01/14/21  1805  01/14/21  0557  01/13/21  0935   SODIUM mmol/L 151* 152* 154*   < > 157*  158*   < > 162*   POTASSIUM mmol/L 3.1* 2.9* 3.2*   < > 3.3*  3.4*   < > 3.6   CHLORIDE mmol/L 115* 116* 117*   < > 119*  120*   < > 115*   CO2 mmol/L 27.3 26.6 26.7   < > 21.3*  24.1   < > 31.1*   BUN mg/dL 26* 29* 33*   < > 40*  41*   < > 47*   CREATININE mg/dL 0.76 0.80 0.93   < > 1.04  0.91   < > 1.08   CALCIUM mg/dL 8.2* 7.9* 8.5*   < > 8.7  8.5*   < > 9.5   BILIRUBIN mg/dL  --   --   --   --  0.8  --  0.6   ALK PHOS U/L  --   --   -- "   --  114  --  136*   ALT (SGPT) U/L  --   --   --   --  27  --  25   AST (SGOT) U/L  --   --   --   --  39  --  19   GLUCOSE mg/dL 169* 187* 166*   < > 129*  131*   < > 178*    < > = values in this interval not displayed.       Estimated Creatinine Clearance: 65.7 mL/min (by C-G formula based on SCr of 0.76 mg/dL).    Results from last 7 days   Lab Units 01/15/21  0631   MAGNESIUM mg/dL 2.3   PHOSPHORUS mg/dL 2.1*       Results from last 7 days   Lab Units 01/15/21  0631   URIC ACID mg/dL 3.2*       Results from last 7 days   Lab Units 01/15/21  0631 01/14/21  0557 01/13/21  0935   WBC 10*3/mm3 9.76 12.65* 15.04*   HEMOGLOBIN g/dL 11.9* 13.5 14.6   PLATELETS 10*3/mm3 157 199 231       Results from last 7 days   Lab Units 01/13/21  0935   INR  1.48*         Imaging Results (Last 24 Hours)     Procedure Component Value Units Date/Time    CT CHEST WITHOUT CONTRAST DIAGNOSTIC [[id number]] Collected: 01/14/21 1027     Updated: 01/14/21 1027    Narrative:      CT CHEST WITHOUT CONTRAST     HISTORY: Follow-up from abnormal chest radiograph. Sepsis.     TECHNIQUE: Axial CT images of the chest were obtained without  administration of intravenous contrast. Coronal and sagittal reformats  were obtained.     COMPARISON: Chest radiograph from 01/13/2021.     FINDINGS: The central airways are patent without endobronchial lesion.  Mixed consolidative and ground glass infiltrates are identified within  the bilateral lower lobes, left greater than right. Small  reticulonodular infiltrates are also seen within the posterior right  middle lobe.     Within the lateral right upper lobe there is an area of peripheral  subpleural architectural distortion [] subpleural scarring with cystic  lucencies. This is favored to represent postinflammatory  scarring/fibrosis. No pleural or pericardial effusion is seen. No  pneumothorax is present. There are small shotty mediastinal lymph nodes  present. Calcified coronary artery disease is present.  No pericardial  effusion.     The visualized upper abdomen demonstrates left renal cortical cysts.  Large amount of formed stool is seen within the visualized colon, most  suggestive of constipation. Marked atherosclerotic change is seen within  the abdominal aorta with ectasia of the infrarenal abdominal aorta  measuring up to 2.9 cm.       Impression:      1. CT findings of multifocal pneumonia.  2. Additional findings as above.     Radiation dose reduction techniques were utilized, including automated  exposure control and exposure modulation based on body size.              insulin lispro, 0-7 Units, Subcutaneous, TID AC  piperacillin-tazobactam, 3.375 g, Intravenous, Q8H  sodium chloride, 10 mL, Intravenous, Q12H      dextrose, 125 mL/hr, Last Rate: 125 mL/hr (01/15/21 0630)        Medication Review:   Current Facility-Administered Medications   Medication Dose Route Frequency Provider Last Rate Last Admin   • acetaminophen (TYLENOL) tablet 650 mg  650 mg Oral Q4H PRN Susanna Son APRN        Or   • acetaminophen (TYLENOL) 160 MG/5ML solution 650 mg  650 mg Oral Q4H PRN Susanna Son APRN        Or   • acetaminophen (TYLENOL) suppository 650 mg  650 mg Rectal Q4H PRN Susanna Son APRN       • dextrose (D50W) 25 g/ 50mL Intravenous Solution 25 g  25 g Intravenous Q15 Min PRN Susanna Son APRN       • dextrose (D5W) 5 % infusion  125 mL/hr Intravenous Continuous Gisella, Elmer Irwin  mL/hr at 01/15/21 0630 125 mL/hr at 01/15/21 0630   • dextrose (GLUTOSE) oral gel 15 g  15 g Oral Q15 Min PRN Susanna Son APRN       • glucagon (human recombinant) (GLUCAGEN DIAGNOSTIC) injection 1 mg  1 mg Subcutaneous Q15 Min PRN Susanna Son APRN       • insulin lispro (humaLOG, ADMELOG) injection 0-7 Units  0-7 Units Subcutaneous TID AC Susanna Son APRN   2 Units at 01/15/21 0750   • nitroglycerin (NITROSTAT) SL tablet 0.4 mg  0.4 mg Sublingual Q5 Min PRN Susanna Son APRN       • OLANZapine  (zyPREXA) injection 5 mg  5 mg Intramuscular Once PRN Susanna Son APRN       • ondansetron (ZOFRAN) injection 4 mg  4 mg Intravenous Q6H PRN Susanna Son APRN       • piperacillin-tazobactam (ZOSYN) 3.375 g in iso-osmotic dextrose 50 ml (premix)  3.375 g Intravenous Q8H Susanna Son APRN   3.375 g at 01/15/21 0208   • potassium chloride (K-DUR,KLOR-CON) ER tablet 40 mEq  40 mEq Oral PRN Lise Sharp APRN        Or   • potassium chloride (KLOR-CON) packet 40 mEq  40 mEq Oral PRN Lise Sharp APRN        Or   • potassium chloride 10 mEq in 100 mL IVPB  10 mEq Intravenous Q1H PRN Lise Sharp APRN 100 mL/hr at 01/15/21 0838 10 mEq at 01/15/21 0838   • sodium chloride 0.9 % flush 10 mL  10 mL Intravenous PRN Mike Manning MD       • sodium chloride 0.9 % flush 10 mL  10 mL Intravenous Q12H Susanna Son APRN   10 mL at 01/14/21 2009   • sodium chloride 0.9 % flush 10 mL  10 mL Intravenous PRN Susanna Son APRN           Assessment/Plan   1.  Severe hypernatremia associated with dehydration, sodium down to 151, his potassium 3.1, creatinine 0.76, has extremely poor oral intake  2.  Dementia, he had significant altered mental status  3.  Hypokalemia, potassium 3.1 magnesium is two-point    Plan:  1.  Increase IV fluid, D5W to 150 cc/h  2.  Replete potassium per protocol  3.  Surveillance labs  4.  Prognosis is very poor and the patient appears to be moribund I will strongly suggest palliative care        Elmer Obando MD  01/15/21  09:59 EST

## 2021-01-15 NOTE — PLAN OF CARE
Goal Outcome Evaluation:  Plan of Care Reviewed With: patient, family      Patient is disoriented x4 and non-verbal. Unable to express needs. Adequate urine output. Patient is pulling on heart monitor leads and IV. Reorient patient/ distract with washcloth to hold. Boots in place; heels elevated. Q2h turn. Oral care performed as needed. Potassium this morning is 2.9. Received potassium protocol orders. K+ runs started at 0200. Confirmed with pharmacy the compatibility of fluids due to limited peripheral access. Pharmacists is okay with Y-tubing potassium with D5W. Patient is NSR/SB when asleep. Remains on RA. No signs of any distress at this time.

## 2021-01-15 NOTE — PLAN OF CARE
Goal Outcome Evaluation:  Plan of Care Reviewed With: patient     Outcome Summary: Orders for Palliative.  Waiting for availability of bed.  VSS.  Cont D5W and gave a total of 6 IV runs of K+.  Stopped for now.  Will cont to monitor.

## 2021-01-16 NOTE — PROGRESS NOTES
"   LOS: 3 days    Patient Care Team:  Chris Dahl MD as PCP - General (Family Medicine)    Chief Complaint:    Chief Complaint   Patient presents with   • Altered Mental Status     Follow-up hypernatremia  Subjective     Interval History:   The patient is obtunded, poorly responsive  Transitioned to palliative care noted.      Review of Systems:   Not obtained    Objective     Vital Signs  Temp:  [96.9 °F (36.1 °C)-97.6 °F (36.4 °C)] 97.1 °F (36.2 °C)  Heart Rate:  [61-69] 61  Resp:  [16-18] 16  BP: (112-143)/(58-74) 112/58    Flowsheet Rows      First Filed Value   Admission Height  177 cm (69.69\") Documented at 01/13/2021 1050   Admission Weight  72.6 kg (160 lb) Documented at 01/13/2021 1050          No intake/output data recorded.  I/O last 3 completed shifts:  In: -   Out: 100 [Urine:100]    Intake/Output Summary (Last 24 hours) at 1/16/2021 1328  Last data filed at 1/16/2021 0632  Gross per 24 hour   Intake --   Output 100 ml   Net -100 ml       Physical Exam:  General Appearance: Chronically ill, very frail, poorly responsive   skin: warm and dry  HEENT: pupils round and reactive to light, oral mucosa very dry, nonicteric sclera  Neck: supple, no JVD, trachea midline  Lungs: Bilateral rhonchi, unlabored breathing effort  Heart: RRR, normal S1 and S2, no S3, no rub  Abdomen: soft, no guarding, normoactive bowels  : no palpable bladder,  Extremities: no edema, cyanosis or clubbing  Neuro: Unable to assess      Results Review:    Results from last 7 days   Lab Units 01/15/21  1225 01/15/21  0631 01/15/21  0021  01/14/21  0557  01/13/21  0935   SODIUM mmol/L 146* 151* 152*   < > 157*  158*   < > 162*   POTASSIUM mmol/L 3.4* 3.1* 2.9*   < > 3.3*  3.4*   < > 3.6   CHLORIDE mmol/L 113* 115* 116*   < > 119*  120*   < > 115*   CO2 mmol/L 25.0 27.3 26.6   < > 21.3*  24.1   < > 31.1*   BUN mg/dL 23 26* 29*   < > 40*  41*   < > 47*   CREATININE mg/dL 0.81 0.76 0.80   < > 1.04  0.91   < > 1.08   CALCIUM " mg/dL 7.9* 8.2* 7.9*   < > 8.7  8.5*   < > 9.5   BILIRUBIN mg/dL  --   --   --   --  0.8  --  0.6   ALK PHOS U/L  --   --   --   --  114  --  136*   ALT (SGPT) U/L  --   --   --   --  27  --  25   AST (SGOT) U/L  --   --   --   --  39  --  19   GLUCOSE mg/dL 184* 169* 187*   < > 129*  131*   < > 178*    < > = values in this interval not displayed.       Estimated Creatinine Clearance: 64.9 mL/min (by C-G formula based on SCr of 0.81 mg/dL).    Results from last 7 days   Lab Units 01/15/21  0631   MAGNESIUM mg/dL 2.3   PHOSPHORUS mg/dL 2.1*       Results from last 7 days   Lab Units 01/15/21  0631   URIC ACID mg/dL 3.2*       Results from last 7 days   Lab Units 01/15/21  0631 01/14/21  0557 01/13/21  0935   WBC 10*3/mm3 9.76 12.65* 15.04*   HEMOGLOBIN g/dL 11.9* 13.5 14.6   PLATELETS 10*3/mm3 157 199 231       Results from last 7 days   Lab Units 01/13/21  0935   INR  1.48*         Imaging Results (Last 24 Hours)     ** No results found for the last 24 hours. **                Medication Review:   Current Facility-Administered Medications   Medication Dose Route Frequency Provider Last Rate Last Admin   • acetaminophen (TYLENOL) tablet 650 mg  650 mg Oral Q4H PRN Susanna Son APRN        Or   • acetaminophen (TYLENOL) 160 MG/5ML solution 650 mg  650 mg Oral Q4H PRN Susanna Son APRN        Or   • acetaminophen (TYLENOL) suppository 650 mg  650 mg Rectal Q4H PRN Susanna Son APRN       • acetaminophen (TYLENOL) tablet 650 mg  650 mg Oral Q4H PRN Devyn De Guzman MD        Or   • acetaminophen (TYLENOL) 160 MG/5ML solution 650 mg  650 mg Oral Q4H PRN Devyn De Guzman MD        Or   • acetaminophen (TYLENOL) suppository 650 mg  650 mg Rectal Q4H PRN Devyn De Guzman MD       • acetaminophen (TYLENOL) tablet 650 mg  650 mg Oral Q4H PRN Devyn De Guzman MD        Or   • acetaminophen (TYLENOL) 160 MG/5ML solution 650 mg  650 mg Oral Q4H PRDevyn Brennan MD        Or   • acetaminophen (TYLENOL)  suppository 650 mg  650 mg Rectal Q4H PRDevyn Brennan MD       • diphenoxylate-atropine (LOMOTIL) 2.5-0.025 MG per tablet 1 tablet  1 tablet Oral Q2H PRDevyn Brennan MD       • diphenoxylate-atropine (LOMOTIL) 2.5-0.025 MG per tablet 1 tablet  1 tablet Oral Q2H PRDevyn Brennan MD       • Glycerin-Hypromellose- (ARTIFICIAL TEARS) 0.2-0.2-1 % ophthalmic solution solution 1 drop  1 drop Both Eyes Q30 Min eDvyn Carballo MD       • Glycerin-Hypromellose- (ARTIFICIAL TEARS) 0.2-0.2-1 % ophthalmic solution solution 1 drop  1 drop Both Eyes Q30 Min Devyn Carballo MD       • glycopyrrolate PF (ROBINUL) injection 0.2 mg  0.2 mg Intravenous Q2H PRDevyn Brennan MD   0.2 mg at 01/16/21 1017    Or   • glycopyrrolate PF (ROBINUL) injection 0.2 mg  0.2 mg Subcutaneous Q2H PRDevyn Brennan MD        Or   • glycopyrrolate PF (ROBINUL) injection 0.4 mg  0.4 mg Intravenous Q2H PRDevyn Brennan MD        Or   • glycopyrrolate PF (ROBINUL) injection 0.4 mg  0.4 mg Subcutaneous Q2H PRDevyn Brennan MD       • glycopyrrolate PF (ROBINUL) injection 0.2 mg  0.2 mg Intravenous Q2H PRDevyn Brennan MD        Or   • glycopyrrolate PF (ROBINUL) injection 0.2 mg  0.2 mg Subcutaneous Q2H PRDevyn Brennan MD        Or   • glycopyrrolate PF (ROBINUL) injection 0.4 mg  0.4 mg Intravenous Q2H PRDevyn Brennan MD        Or   • glycopyrrolate PF (ROBINUL) injection 0.4 mg  0.4 mg Subcutaneous Q2H PRDevyn Brennan MD       • HYDROmorphone (DILAUDID) injection 0.5 mg  0.5 mg Intravenous Q1H PRN Devyn De Guzman MD        Or   • morphine injection 2 mg  2 mg Intravenous Q1H PRN Devyn De Guzman MD   2 mg at 01/16/21 0822    Or   • morphine concentrated solution solution 5 mg  5 mg Sublingual Q1H PRN Devyn De Guzman MD        Or   • ketorolac (TORADOL) injection 15 mg  15 mg Intravenous Q6H PRN Devyn De Guzman MD       • Morphine sulfate (PF) injection 4 mg  4 mg  Intravenous Q1H PRN Lise Sharp APRN   4 mg at 01/16/21 1221    Or   • HYDROmorphone (DILAUDID) injection 0.5 mg  0.5 mg Intravenous Q1H PRN Lise Sharp APRN        Or   • morphine concentrated solution solution 5 mg  5 mg Sublingual Q1H PRN Lise Sharp APRN        Or   • ketorolac (TORADOL) injection 15 mg  15 mg Intravenous Q6H PRN Lise Sharp APRN       • LORazepam (ATIVAN) injection 0.5 mg  0.5 mg Intravenous Q1H PRN Devyn De Guzman MD   0.5 mg at 01/16/21 0427    Or   • LORazepam (ATIVAN) injection 0.5 mg  0.5 mg Subcutaneous Q1H PRDevyn Brennan MD        Or   • LORazepam (ATIVAN) 2 MG/ML concentrated solution 0.5 mg  0.5 mg Sublingual Q1H PRDevyn Brennan MD       • LORazepam (ATIVAN) injection 0.5 mg  0.5 mg Intravenous Q1H PRDevyn Brennan MD   0.5 mg at 01/16/21 0822    Or   • LORazepam (ATIVAN) injection 0.5 mg  0.5 mg Subcutaneous Q1H PRDevyn Brennan MD        Or   • LORazepam (ATIVAN) 2 MG/ML concentrated solution 0.5 mg  0.5 mg Sublingual Q1H PRDevyn Brennan MD       • LORazepam (ATIVAN) injection 1 mg  1 mg Intravenous Q1H PRDevyn Brennan MD        Or   • LORazepam (ATIVAN) injection 1 mg  1 mg Subcutaneous Q1H PRDevyn Brennan MD        Or   • LORazepam (ATIVAN) 2 MG/ML concentrated solution 1 mg  1 mg Sublingual Q1H PRDevyn Brennan MD       • LORazepam (ATIVAN) injection 1 mg  1 mg Intravenous Q1H PRDevyn Brennan MD   1 mg at 01/16/21 1220    Or   • LORazepam (ATIVAN) injection 1 mg  1 mg Subcutaneous Q1H PRDevyn Brennan MD        Or   • LORazepam (ATIVAN) 2 MG/ML concentrated solution 1 mg  1 mg Sublingual Q1H PRN Devyn De Guzman MD       • LORazepam (ATIVAN) injection 2 mg  2 mg Intravenous Q1H PRN Devyn De Guzman MD        Or   • LORazepam (ATIVAN) injection 2 mg  2 mg Subcutaneous Q1H PRDevyn Brennan MD        Or   • LORazepam (ATIVAN) 2 MG/ML concentrated solution 2 mg  2 mg Sublingual Q1H  PRN Devyn De Guzman MD       • LORazepam (ATIVAN) injection 2 mg  2 mg Intravenous Q1H PRN Devyn De Guzman MD        Or   • LORazepam (ATIVAN) injection 2 mg  2 mg Subcutaneous Q1H PRN Devyn De Guzman MD        Or   • LORazepam (ATIVAN) 2 MG/ML concentrated solution 2 mg  2 mg Sublingual Q1H PRN Devyn De Guzman MD       • OLANZapine (zyPREXA) injection 5 mg  5 mg Intramuscular Once PRN Susanna Son APRMYA       • ondansetron (ZOFRAN) injection 4 mg  4 mg Intravenous Q6H PRN Susanna Son APRMYA       • potassium chloride (K-DUR,KLOR-CON) ER tablet 40 mEq  40 mEq Oral PRN Lise Sharp APRN        Or   • potassium chloride (KLOR-CON) packet 40 mEq  40 mEq Oral PRN Lise Sharp APRN        Or   • potassium chloride 10 mEq in 100 mL IVPB  10 mEq Intravenous Q1H PRN Lise Sharp APRN 100 mL/hr at 01/15/21 0838 10 mEq at 01/15/21 0838   • Scopolamine (TRANSDERM-SCOP) 1.5 MG/3DAYS patch 1 patch  1 patch Transdermal Q72H PRN Devyn De Guzman MD       • Scopolamine (TRANSDERM-SCOP) 1.5 MG/3DAYS patch 1 patch  1 patch Transdermal Q72H PRN Devyn De Guzman MD       • sodium chloride 0.9 % flush 10 mL  10 mL Intravenous PRN Mike Manning MD           Assessment/Plan   1.  Severe hypernatremia associated with dehydration, sodium down to 151, his potassium 3.1, creatinine 0.76, has extremely poor oral intake.  D5W increased to 150cc/hr.  Now with palliative measures.  Will s/o.  Call if any changes.  2.  Dementia, he had significant altered mental status          Mike Lee MD  01/16/21  13:28 EST

## 2021-01-16 NOTE — PROGRESS NOTES
Name: Anthony Hernandez ADMIT: 2021   : 1940  PCP: Chris Dahl MD    MRN: [id number] LOS: 3 days   AGE/SEX: 80 y.o. male  ROOM: [facility]     Subjective   Subjective   Patient not very responsive    Review of Systems   Unable to perform ROS: Dementia      Objective   Objective   Vital Signs  Temp:  [96.9 °F (36.1 °C)-97.6 °F (36.4 °C)] 97.1 °F (36.2 °C)  Heart Rate:  [56-69] 61  Resp:  [16-18] 16  BP: (100-143)/(58-74) 112/58  SpO2:  [92 %-100 %] 97 %  on   ;   Device (Oxygen Therapy): room air  Body mass index is 19.94 kg/m².    Physical Exam  Constitutional:       Appearance: He is ill-appearing.      Interventions: He is sedated.   Cardiovascular:      Rate and Rhythm: Normal rate.   Pulmonary:      Effort: No respiratory distress.      Breath sounds: Decreased breath sounds present.   Musculoskeletal:         General: No swelling.   Skin:     General: Skin is warm and dry.   Neurological:      Mental Status: He is lethargic.     Results Review  I reviewed the patient's new clinical results.  Results from last 7 days   Lab Units 01/15/21  0631 21  0557 21  0935   WBC 10*3/mm3 9.76 12.65* 15.04*   HEMOGLOBIN g/dL 11.9* 13.5 14.6   PLATELETS 10*3/mm3 157 199 231     Results from last 7 days   Lab Units 01/15/21  1225 01/15/21  0631 01/15/21  0021 21  1805   SODIUM mmol/L 146* 151* 152* 154*   POTASSIUM mmol/L 3.4* 3.1* 2.9* 3.2*   CHLORIDE mmol/L 113* 115* 116* 117*   CO2 mmol/L 25.0 27.3 26.6 26.7   BUN mg/dL 23 26* 29* 33*   CREATININE mg/dL 0.81 0.76 0.80 0.93   GLUCOSE mg/dL 184* 169* 187* 166*     Estimated Creatinine Clearance: 64.9 mL/min (by C-G formula based on SCr of 0.81 mg/dL).    Results from last 7 days   Lab Units 01/15/21  0631 21  0557 21  0935   ALBUMIN g/dL 2.60* 3.00* 3.80   BILIRUBIN mg/dL  --  0.8 0.6   ALK PHOS U/L  --  114 136*   AST (SGOT) U/L  --  39 19   ALT (SGPT) U/L  --  27 25     Results from last 7 days   Lab Units 01/15/21  4645  01/15/21  0631 01/15/21  0021 01/14/21  1805  01/14/21  0557  01/13/21  0935   CALCIUM mg/dL 7.9* 8.2* 7.9* 8.5*   < > 8.7  8.5*   < > 9.5   ALBUMIN g/dL  --  2.60*  --   --   --  3.00*  --  3.80   MAGNESIUM mg/dL  --  2.3  --   --   --   --   --   --    PHOSPHORUS mg/dL  --  2.1*  --   --   --   --   --   --     < > = values in this interval not displayed.     Results from last 7 days   Lab Units 01/15/21  0631 01/13/21  1436 01/13/21  0935   PROCALCITONIN ng/mL 0.28*  --  0.64*   LACTATE mmol/L  --  1.9 3.9*     COVID19   Date Value Ref Range Status   01/13/2021 Not Detected Not Detected - Ref. Range Final     Hemoglobin A1C   Date/Time Value Ref Range Status   01/13/2021 0935 6.58 (H) 4.80 - 5.60 % Final     Glucose   Date/Time Value Ref Range Status   01/15/2021 2127 194 (H) 70 - 130 mg/dL Final   01/15/2021 1627 171 (H) 70 - 130 mg/dL Final   01/15/2021 1116 150 (H) 70 - 130 mg/dL Final   01/15/2021 0541 189 (H) 70 - 130 mg/dL Final   01/14/2021 2017 147 (H) 70 - 130 mg/dL Final   01/14/2021 1620 174 (H) 70 - 130 mg/dL Final   01/14/2021 1114 166 (H) 70 - 130 mg/dL Final       Scheduled Meds   Continuous Infusions   PRN Meds  •  acetaminophen **OR** acetaminophen **OR** acetaminophen  •  acetaminophen **OR** acetaminophen **OR** acetaminophen  •  acetaminophen **OR** acetaminophen **OR** acetaminophen  •  diphenoxylate-atropine  •  diphenoxylate-atropine  •  Glycerin-Hypromellose-  •  Glycerin-Hypromellose-  •  glycopyrrolate **OR** glycopyrrolate **OR** glycopyrrolate **OR** glycopyrrolate  •  glycopyrrolate **OR** glycopyrrolate **OR** glycopyrrolate **OR** glycopyrrolate  •  HYDROmorphone **OR** Morphine **OR** morphine **OR** ketorolac  •  HYDROmorphone **OR** Morphine **OR** morphine **OR** ketorolac  •  LORazepam **OR** LORazepam **OR** LORazepam  •  LORazepam **OR** LORazepam **OR** LORazepam  •  LORazepam **OR** LORazepam **OR** LORazepam  •  LORazepam **OR** LORazepam **OR** LORazepam  •   LORazepam **OR** LORazepam **OR** LORazepam  •  LORazepam **OR** LORazepam **OR** LORazepam  •  OLANZapine  •  ondansetron  •  potassium chloride **OR** potassium chloride **OR** potassium chloride  •  Scopolamine  •  Scopolamine  •  [COMPLETED] Insert peripheral IV **AND** sodium chloride     Diet  NPO Diet     Assessment/Plan     Active Hospital Problems    Diagnosis  POA   • **Metabolic encephalopathy [G93.41]  Yes   • Multifocal pneumonia [J18.9]  Yes   • Sepsis (CMS/HCC) [A41.9]  Yes   • Coronary artery disease [I25.10]  Yes   • Hypernatremia [E87.0]  Yes   • Abnormal CXR [R93.89]  Yes   • Hypothyroidism [E03.9]  Yes   • Dementia without behavioral disturbance (CMS/HCC) [F03.90]  Yes   • ZAIN (obstructive sleep apnea) [G47.33]  Yes   • HLD (hyperlipidemia) [E78.5]  Yes   • Diabetes mellitus type 2, noninsulin dependent (CMS/HCC) [E11.9]  Yes      Resolved Hospital Problems   No resolved problems to display.     80 y.o. male with a history of dementia admitted with severe hypernatremia.    · Continue palliative care  · He appears comfortable.  · He is received 3 doses of morphine and 3 doses of Ativan today so far  · Hospice to see.  Suspect he will qualify for scattered bed    Devyn Damion De Guzman MD  [facility]  01/16/21  09:26 EST    I wore protective equipment throughout this patient encounter including a face mask, gloves, and protective eyewear.  Hand hygiene was performed before donning protective equipment and after removal when leaving the room.

## 2021-01-16 NOTE — PLAN OF CARE
Problem: Adult Inpatient Plan of Care  Goal: Plan of Care Review  Outcome: Ongoing, Progressing  Flowsheets (Taken 1/16/2021 0523)  Progress: declining  Plan of Care Reviewed With: patient  Outcome Summary: pt transferred from [address] [place] [address] @ 249 am. pt recieved pain 2 mg of morphine  and anxiety 0.5 mg of ativan meds x2, tolerated well. D/C restraints. lee catheter placed for comfort. Hospice consult placed. will continue to monitor and keep comfortable     Problem: Adult Inpatient Plan of Care  Goal: Patient-Specific Goal (Individualized)  Outcome: Ongoing, Progressing  Flowsheets (Taken 1/16/2021 0523)  Patient-Specific Goals (Include Timeframe): to keep comfortable  Individualized Care Needs: PRN meds, q4 turns, lee placed

## 2021-01-16 NOTE — PLAN OF CARE
Goal Outcome Evaluation:  Plan of Care Reviewed With: family  Progress: declining  Outcome Summary: PPS 10%, Pt minimally responsive. Pt having increased pain and SOB this morning, requiring extra doses of morphine and ativan. Morphine and ativan increased to improve comfort. Pt's breathing and moaning ceased. Pt is pale, warm to touch. Breathing is currently slightly congested sounding, robinul added to premedications.

## 2021-01-17 NOTE — PLAN OF CARE
Problem: Adult Inpatient Plan of Care  Goal: Plan of Care Review  Outcome: Ongoing, Progressing  Flowsheets (Taken 1/17/2021 0358)  Progress: declining  Plan of Care Reviewed With: patient  Outcome Summary: premedicated prior to turns with 4 mg of morphine, 1mg of ativan which was increased due to moaning and 0.2 mg of robinul. pt is tolerating well. lee and oral care done. will continue to monitor and keep comfortable     Problem: Adult Inpatient Plan of Care  Goal: Patient-Specific Goal (Individualized)  Outcome: Ongoing, Progressing  Flowsheets  Taken 1/17/2021 0358  Individualized Care Needs: PRN meds, q4 turns, lee catheter and oral  Taken 1/16/2021 0509  Patient-Specific Goals (Include Timeframe): to keep comfortable

## 2021-01-17 NOTE — PLAN OF CARE
Goal Outcome Evaluation:  Plan of Care Reviewed With: spouse  Progress: declining  Outcome Summary: Pt remains minimally responsive, responsive to pain, nonverbal. Breathing labored at times with expiratory moan. Pt's congestion well controlled with robinul. Pt receiving morphine and ativan prior to cares for increased comfort. Pt continues to moan during turns. Pt's wife and son at bedside, updated on condition, support and end of life education provided.

## 2021-01-17 NOTE — CONSULTS
[facility] RN met with patient's sp Sandra and son MARC to provide explanation of services with focus on inpatient hospice care.  Goals in alignment.  Dr. Johnston agreeable to complete discharge and readmission to [facility] today.  Consents signed by son/HCS MARC.  Admission to [facility] completed.  Benefit change letter scanned to HIM and copy placed in CCP office.  Update provided to RN Law.    [facility] will begin daily RN visits on 1/18/2021.    Please contact [facility] for any questions/concerns.    Thank you for this referral.    Alexa Jules, Referral & Admissions Coordinator  [facility]  [phone]

## 2021-01-17 NOTE — DISCHARGE SUMMARY
"Date of Admission: 1/13/2021  Date of Discharge:  1/17/2021  Primary Care Physician: Chris Dahl MD     Discharge Diagnosis:  Active Hospital Problems    Diagnosis  POA   • **Metabolic encephalopathy [G93.41]  Yes   • Multifocal pneumonia [J18.9]  Yes   • Sepsis (CMS/HCC) [A41.9]  Yes   • Coronary artery disease [I25.10]  Yes   • Hypernatremia [E87.0]  Yes   • Abnormal CXR [R93.89]  Yes   • Hypothyroidism [E03.9]  Yes   • Dementia without behavioral disturbance (CMS/HCC) [F03.90]  Yes   • ZAIN (obstructive sleep apnea) [G47.33]  Yes   • HLD (hyperlipidemia) [E78.5]  Yes   • Diabetes mellitus type 2, noninsulin dependent (CMS/[facility]) [E11.9]  Yes      Resolved Hospital Problems   No resolved problems to display.       Presenting Problem/History of Present Illness:  Hypernatremia [E87.0]  Volume depletion [E86.9]  NSTEMI (non-ST elevated myocardial infarction) (CMS/HCC) [I21.4]  Chronic pneumothorax [J93.81]  Sepsis, due to unspecified organism, unspecified whether acute organ dysfunction present (CMS/HCC) [A41.9]     Hospital Course:  The patient is a 80 y.o. male with a history of dementia, CAD, hypothyroidism, ZAIN, and type 2 DM residing at a long term care facility presented with altered mental status from baseline. He was found to have sepsis due to pneumonia as well as severe dehydration with a serum sodium of 162. He was started on IV fluids and antibiotics. Despite this treatment he did not make significant clinical progress and given his advanced dementia and overall low quality of life his family decided to pursue palliative care. He was evaluated by [facility] and is being discharged to a hospice scattered bed.    Exam Today:  Blood pressure (!) 67/51, pulse 88, temperature 98.7 °F (37.1 °C), temperature source Oral, resp. rate 16, height 177.8 cm (70\"), weight 63 kg (139 lb), SpO2 (!) 88 %.  Constitutional:       Appearance: He is elderly, frail, and acutely ill appearing  Head:  Normocephalic, " atraumatic  Mouth/Throat:  Oropharynx clear and moist  Neck:Supple, no tenderness  Cardiovascular:      Rate and Rhythm: Normal rate. Regular rhythm    Distal pulses are weak  Pulmonary:      Effort: shallow breathing with periods of apnea     Breath sounds: decreased throughout  Abdomen:  Soft, non-tender, hypoactive bowel sounds  Musculoskeletal:         General: No swelling. No tenderness  Skin:     General: Skin is dry. Extremities are cool.  Neurological:      Mental Status: He is unresponsive     Normal tone    Consults:   Consults     Date and Time Order Name Status Description    1/13/2021 1127 Nephrology (on -call MD unless specified) Completed     1/13/2021 1057 LHA (on-call MD unless specified) Details Completed            Discharge Disposition: Hospice Scattered Bed  Hospice/Medical Facility ([facility])    Discharge Medications:  •  acetaminophen **OR** acetaminophen **OR** acetaminophen  •  diphenoxylate-atropine  •  Glycerin-Hypromellose-  •  glycopyrrolate **OR** glycopyrrolate **OR** glycopyrrolate **OR** glycopyrrolate  •  HYDROmorphone **OR** Morphine **OR** morphine **OR** ketorolac  •  LORazepam **OR** LORazepam **OR** LORazepam  •  LORazepam  •  LORazepam  •  OLANZapine  •  ondansetron  •  potassium chloride **OR** potassium chloride **OR** potassium chloride  •  Scopolamine  •  Scopolamine  •  [COMPLETED] Insert peripheral IV **AND** sodium chloride       Stephan Johnston MD  01/17/21  16:51 EST    Time Spent on Discharge Activities: Greater than 30 minutes.

## 2021-01-18 PROBLEM — F02.80 ALZHEIMER'S DISEASE WITH LATE ONSET (HCC): Status: ACTIVE | Noted: 2021-01-01

## 2021-01-18 PROBLEM — G30.1 ALZHEIMER'S DISEASE WITH LATE ONSET (HCC): Status: ACTIVE | Noted: 2021-01-01

## 2021-01-18 PROBLEM — G31.1 SENILE DEGENERATION OF BRAIN (HCC): Status: ACTIVE | Noted: 2021-01-01

## 2021-01-18 PROBLEM — Z51.5 ENCOUNTER FOR PALLIATIVE CARE: Status: ACTIVE | Noted: 2021-01-01

## 2021-01-18 NOTE — PROGRESS NOTES
Case Management Discharge Note      Final Note: Admitted to a [facility] scattered bed on 1/17/2021. OSCAR Quinonez RN, CCP.    Provided Post Acute Provider Quality & Resource List?: Refused  Refused Quality and Resource List Comment: wife declined    Selected Continued Care - Discharged on 1/17/2021 Admission date: 1/13/2021 - Discharge disposition: Hospice/Medical Facility (DCR - [facility])    Destination Coordination complete    Service Provider Selected Services Address Phone Fax Patient Preferred    [facility]  Inpatient Hospice [address], [place] [id number] [phone] --          Durable Medical Equipment    No services have been selected for the patient.              Dialysis/Infusion    No services have been selected for the patient.              Home Medical Care    No services have been selected for the patient.              Therapy    No services have been selected for the patient.              Community Resources    No services have been selected for the patient.                Selected Continued Care - Prior Encounters Includes selections from prior encounters from 10/15/2020 to 1/17/2021    Admitted Since 1/17/2021     Destination     Service Provider Selected Services Address Phone Fax Patient Preferred    [facility]  Inpatient Hospice [address], [place] [id number] [phone] --                         Final Discharge Disposition Code: 51 - hospice medical facility

## 2021-01-23 PROBLEM — F02.80 LATE ONSET ALZHEIMER'S DEMENTIA WITHOUT BEHAVIORAL DISTURBANCE (HCC): Status: ACTIVE | Noted: 2018-01-05

## 2021-01-23 PROBLEM — I27.20 PULMONARY HYPERTENSION (HCC): Status: ACTIVE | Noted: 2021-01-01

## 2021-01-23 PROBLEM — I50.42 CHRONIC COMBINED SYSTOLIC AND DIASTOLIC CONGESTIVE HEART FAILURE (HCC): Status: ACTIVE | Noted: 2021-01-01

## 2021-01-23 PROBLEM — Z95.1 HX OF CABG: Status: ACTIVE | Noted: 2021-01-01

## 2021-01-23 PROBLEM — J69.0 ASPIRATION PNEUMONITIS (HCC): Status: ACTIVE | Noted: 2021-01-01

## 2021-01-23 PROBLEM — E87.0 HYPERNATREMIA: Status: ACTIVE | Noted: 2021-01-01

## 2021-01-23 PROBLEM — R13.12 OROPHARYNGEAL DYSPHAGIA: Status: ACTIVE | Noted: 2021-01-01

## 2021-01-23 PROBLEM — I67.9 CEREBROVASCULAR SMALL VESSEL DISEASE: Status: ACTIVE | Noted: 2021-01-01
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
## 2021-03-09 NOTE — TELEPHONE ENCOUNTER
Informed pt's wife that the script has been sent to their local pharmacy   Eucrisa Counseling: Patient may experience a mild burning sensation during topical application. Eucrisa is not approved in children less than 2 years of age.

## 2023-05-02 NOTE — THERAPY EVALUATION
"    Outpatient Physical Therapy Ortho Initial Evaluation  [facility]     Patient Name: Anthony Hernandez  : 1940  MRN: [id number]  Today's Date: 6/3/2019      Visit Date: 2019    Patient Active Problem List   Diagnosis   • Aortic heart valve narrowing   • Asymptomatic carotid artery narrowing without infarction   • Cor pulmonale (CMS/HCC)   • Functional murmur   • BP (high blood pressure)   • Lumbar radiculopathy   • CN (constipation)   • DDD (degenerative disc disease), lumbar   • Fatigue   • HLD (hyperlipidemia)   • Amnesia   • ZAIN (obstructive sleep apnea)   • Loud breathing during sleep   • Diabetes mellitus type 2, noninsulin dependent (CMS/HCC)   • Multiple thyroid nodules   • Spinal stenosis of lumbar region with neurogenic claudication   • Difficulty walking   • Hearing loss of both ears   • Episodic confusion   • Altered mental status   • Late onset Alzheimer's disease with behavioral disturbance   • Dementia without behavioral disturbance   • Hypothyroidism   • Acute intractable headache   • Herniation of lumbar intervertebral disc with radiculopathy   • Halitosis        Past Medical History:   Diagnosis Date   • Acute myocardial infarction (CMS/HCC)    • Anesthesia complication     DIFFICULTY COMING OUT OF SEDATION   • Aortic valve sclerosis    • Coronary artery disease     History of remote anterior wall myocardial infarction in .   • Diabetes mellitus (CMS/HCC)    • Disease of thyroid gland    • Erectile dysfunction    • Fall     fell and was seen in the ED, \"his leg gave out on him\"   • Hearing difficulty of both ears     Normally wears hearing aids   • Heart attack (CMS/HCC)    • Heart attack (CMS/HCC)    • Heart murmur    • Hyperlipidemia    • Intermittent claudication (CMS/HCC)    • Low back pain    • Osteoarthritis    • Peripheral neuropathy    • Peripheral vascular disease (CMS/HCC)    • Skin cancer    • Sleep apnea     cpap, can't use due to drooling   • Spinal stenosis       "   Past Surgical History:   Procedure Laterality Date   • ATHERECTOMY Left     BRANT FEMORAL-POPLITEAL INITIAL STENOSIS WITH ATHERECTOMY AND STENT LEFT   • CATARACT EXTRACTION, BILATERAL     • COLONOSCOPY     • COLONOSCOPY W/ POLYPECTOMY      BENIGN   • CORONARY ANGIOPLASTY WITH STENT PLACEMENT     • CORONARY ARTERY BYPASS GRAFT  04/2010    cabg x 3: LIMA to LAD, vein graft to RCA and OM1   • EPIDURAL  01/2019   • LUMBAR LAMINECTOMY DISCECTOMY DECOMPRESSION Bilateral 6/27/2017    Procedure: L3/4, L4/5 OPEN LUMBAR DECOMPRESSION POSTERIOR 1-2 LEVELS;  Surgeon: Ranjeet Contreras MD;  Location: [facility];  Service:    • SPINE SURGERY     • THROMBOENDARTERECTOMY Left     NEUROLOGICAL SURGERY CAROTID ENDARTERECTOMY LEFT       Visit Dx:     ICD-10-CM ICD-9-CM   1. Chronic left-sided low back pain with left-sided sciatica M54.42 724.2    G89.29 724.3     338.29   2. Difficulty walking R26.2 719.7         Patient History     Row Name 06/03/19 1400             History    Chief Complaint  Pain  -CA      Type of Pain  Back pain  -CA      Brief Description of Current Complaint  Anthony Hernandez is a 79 y.o. male who presents today with LBP. Pt has hx of L3/4 and L4/5 lumbar decompression. Pt reports back pain has been ongoing since this time. Pt most recently saw  in 4/2019, MRI remarkable for nerve compression at L3/4 and herniated disc, and also had epidural at this time with another scheduled for July. Plan is to attempt conservative management to avoid further surgery. Pt reports pain radiates down L LE to L ankle. Pt reports increased pain with walking and standing, and navigating steps. Pt reports pain relief with sitting and epidural. Pt reports problems with balance and near falls. Pt denies red flags since the back sx, although wife reports recent onset of frequent urination with occasional incontinence (which is likely related to his dementia). PMH includes lumbar decompression, CABGx3, dementia, [place], [place].  Most of subjective history obtained from wife, present in room d/t to pts degree of dementia and [place].  -CA      Previous treatment for THIS PROBLEM  Surgery;Injections  -CA      Patient/Caregiver Goals  Relieve pain;Improve mobility;Improve strength;Know what to do to help the symptoms  -CA      What clinical tests have you had for this problem?  X-ray;MRI  -CA      Results of Clinical Tests  see Epic  -CA         Pain     Pain Location  Back  -CA      Pain at Present  0  -CA      Pain at Best  10  -CA      Pain at Worst  0  -CA      Is your sleep disturbed?  No  -CA         Fall Risk Assessment    Any falls in the past year:  No  -CA      Number of falls reported in the last 12 months  near falls; fell 2 years ago  -CA         Daily Activities    Primary Language  English  -CA      How does patient learn best?  Listening;Reading;Demonstration  -CA      Teaching needs identified  Home Exercise Program;Management of Condition;Falls Prevention  -CA      Patient is concerned about/has problems with  Climbing Stairs;Walking;Standing  -CA      Does patient have problems with the following?  None  -CA      Barriers to learning  None  -CA      Pt Participated in POC and Goals  Yes  -CA         Safety    Are you being hurt, hit, or frightened by anyone at home or in your life?  No  -CA      Are you being neglected by a caregiver  No  -CA        User Key  (r) = Recorded By, (t) = Taken By, (c) = Cosigned By    Initials Name Provider Type    CA Nancy Rodrigues PT Physical Therapist          PT Ortho     Row Name 06/03/19 1500       Posture/Observations    Posture/Observations Comments  increased thoracic kyphosis, fwd head, rounded shoulders, decreased lumbar lordosis  -CA       Neural Tension Signs- Lower Quarter Clearing    SLR  Left:;Positive  -CA       Myotomal Screen- Lower Quarter Clearing    Hip flexion (L2)  Bilateral:;4 (Good)  -CA    Knee extension (L3)  Bilateral:;4 (Good)  -CA    Ankle DF (L4)  Bilateral:;5  (Normal)  -CA    Knee flexion (S2)  Bilateral:;4 (Good)  -CA       Lumbar ROM Screen- Lower Quarter Clearing    Lumbar Flexion  Impaired 50% of WNL  -CA    Lumbar Extension  Impaired 10% of WNL  -CA    Lumbar Rotation  Impaired 10% of WNL  -CA       MMT Right Lower Ext    Rt Hip ABduction MMT, Gross Movement  (3/5) fair  -CA       MMT Left Lower Ext    Lt Hip ABduction MMT, Gross Movement  (3/5) fair  -CA       Sensation    Sensation WNL?  WNL  -CA       Lower Extremity Flexibility    Hamstrings  Bilateral:;Severely limited  -CA       Balance Skills Training    SLS  <3s B  -CA      User Key  (r) = Recorded By, (t) = Taken By, (c) = Cosigned By    Initials Name Provider Type    Nancy Oden, PT Physical Therapist                      Therapy Education  Education Details: PT POC, eval findings  Given: HEP, Symptoms/condition management, Pain management, Posture/body mechanics, Fall prevention and home safety  Program: New  How Provided: Verbal, Demonstration, Written  Provided to: Patient, Caregiver  Level of Understanding: Demonstrated, Verbalized     PT OP Goals     Row Name 06/03/19 1500          PT Short Term Goals    STG Date to Achieve  07/01/19  -CA     STG 1  Patient will be independent with education for symptom management and initial HEP to decrease LBP pain.  -CA     STG 1 Progress  New  -CA     STG 2  Pt will improve B LE strength to at least 4+/5.  -CA     STG 2 Progress  New  -CA     STG 3  Pt will improve lumbar ROM to WNL with </=3/10 pain in all cardinal planes for improved mobility and participation in ADLs.  -CA     STG 3 Progress  New  -CA        Long Term Goals    LTG Date to Achieve  07/29/19  -CA     LTG 1  Patient will be independent with education for symptom management and advanced HEP to decrease LBP pain.  -CA     LTG 1 Progress  New  -CA     LTG 2  Patient will reduce level of percieved disability as measured by the Modified Oswestry  from 50% disability to </= 20% disability in  order to improve quality of life.  -CA     LTG 2 Progress  New  -CA     LTG 3  Patient will improve walking tolerance to 30 min without LBP to improve participation in community activities.  -CA     LTG 3 Progress  New  -CA       User Key  (r) = Recorded By, (t) = Taken By, (c) = Cosigned By    Initials Name Provider Type    Nancy Oden, PT Physical Therapist          PT Assessment/Plan     Row Name 06/03/19 1500          PT Assessment    Functional Limitations  Impaired gait;Limitation in home management;Limitations in community activities;Performance in leisure activities;Performance in self-care ADL  -CA     Impairments  Gait;Balance;Range of motion;Posture;Poor body mechanics;Pain;Muscle strength;Joint mobility;Impaired flexibility  -CA     Assessment Comments  Anthony Hernandez is a 79 y.o. male referred to physical therapy for LBP. He presents with an evolving clinical presentation, along with  comorbidities of dementia and personal factors of [place] that may impact his progress in the plan of care. Pt presents today with poor balance/unsteadiness during ambulation, LE weakness, decreased flexibility, decreased Lumbar ROM, and pain . his signs and symptoms are consistent with referring diagnosis. The previous impairments limit his ability to walk, navigate steps, and stand for long periods of time. Pt will benefit from skilled PT to address the previous impairments and return to PLOF.  -CA     Please refer to paper survey for additional self-reported information  Yes  -CA     Rehab Potential  Fair  -CA     Patient/caregiver participated in establishment of treatment plan and goals  Yes  -CA     Patient would benefit from skilled therapy intervention  Yes  -CA        PT Plan    PT Frequency  2x/week  -CA     Predicted Duration of Therapy Intervention (Therapy Eval)  6 weeks  -CA     Planned CPT's?  PT EVAL MOD COMPLELITY: 18033;PT RE-EVAL: 53684;PT THER PROC EA 15 MIN: 28832;PT THER ACT EA 15 MIN: 72186;PT  MANUAL THERAPY EA 15 MIN: 45983;PT NEUROMUSC RE-EDUCATION EA 15 MIN: 90243;PT GAIT TRAINING EA 15 MIN: 09705;PT SELF CARE/HOME MGMT/TRAIN EA 15: 91751;PT HOT OR COLD PACK TREAT MCARE;PT ELECTRICAL STIM UNATTEND: ;PT TRACTION LUMBAR: 47679;PT ULTRASOUND EA 15 MIN: 96762  -CA     PT Plan Comments  Next visit, assess response to HEP, consider addition of nustep, hip abd/add, and HS stretch  -CA       User Key  (r) = Recorded By, (t) = Taken By, (c) = Cosigned By    Initials Name Provider Type    Nancy Oden, BING Physical Therapist            Exercises     Row Name 06/03/19 1500             Total Minutes    05913 - PT Therapeutic Exercise Minutes  15 increased time for ther ex d/t dementia and needs max cues  -CA         Exercise 1    Exercise Name 1  Post. Pelvic tilt  -CA      Cueing 1  Verbal;Tactile  -CA      Reps 1  10  -CA      Time 1  5s  -CA      Additional Comments  cues to avoid bridging  -CA         Exercise 2    Exercise Name 2  Modified Piriformis Stretch  -CA      Cueing 2  Verbal  -CA      Reps 2  3  -CA      Time 2  20s  -CA      Additional Comments  B  -CA        User Key  (r) = Recorded By, (t) = Taken By, (c) = Cosigned By    Initials Name Provider Type    Nancy Oden PT Physical Therapist                        Outcome Measure Options: Modifed Owestry  Modified Oswestry  Modified Oswestry Score/Comments: 50% disability      Time Calculation:     Start Time: 1345  Stop Time: 1430  Time Calculation (min): 45 min  Total Timed Code Minutes- PT: 15 minute(s)     Therapy Charges for Today     Code Description Service Date Service Provider Modifiers Qty    24041045680 HC PT THER PROC EA 15 MIN 6/3/2019 Nancy Rodrigues, PT GP 1    40360799105 HC PT EVAL MOD COMPLEXITY 2 6/3/2019 Nancy Rodrigues, PT GP 1          PT G-Codes  Outcome Measure Options: Modifed [place]  Modified Oswestry Score/Comments: 50% disability         Nancy Rodrigues PT  6/3/2019       Advancement Flap (Single) Text: The defect edges were debeveled with a #15 scalpel blade. Given the location of the defect and the proximity to free margins a single advancement flap was deemed most appropriate. Using a sterile surgical marker, an appropriate advancement flap was drawn incorporating the defect and placing the expected incisions within the relaxed skin tension lines where possible. The area thus outlined was incised deep to adipose tissue with a #15 scalpel blade. The skin margins were undermined to an appropriate distance in all directions utilizing iris scissors. Following this, the designed flap was advanced and carried over into the primary defect and sutured into place.

## (undated) DEVICE — FLOSEAL MATRIX IS INDICATED IN SURGICAL PROCEDURES (OTHER THAN IN OPHTHALMIC) AS AN ADJUNCT TO HEMOSTASIS WHEN CONTROL OF BLEEDING BY LIGATURE OR CONVENTIONAL PROCEDURES IS INEFFECTIVE OR IMPRACTICAL.: Brand: FLOSEAL HEMOSTATIC MATRIX

## (undated) DEVICE — SUT SILK 2/0 FS BLK 18IN 685G

## (undated) DEVICE — DRSNG WND GZ PAD BORDERED 4X8IN STRL

## (undated) DEVICE — JACKSON-PRATT 100CC BULB RESERVOIR: Brand: CARDINAL HEALTH

## (undated) DEVICE — 3.0MM PRECISION NEURO (MATCH HEAD)

## (undated) DEVICE — GOWN,NON-REINFORCED,SIRUS,SET IN SLV,XXL: Brand: MEDLINE

## (undated) DEVICE — SYR CONTRL LUERLOK 10CC

## (undated) DEVICE — PK NEURO SPINE 40

## (undated) DEVICE — APPL CHLORAPREP W/TINT 26ML ORNG

## (undated) DEVICE — PK ATS CUST W CARDIOTOMY RESEVOIR

## (undated) DEVICE — ANTIBACTERIAL UNDYED BRAIDED (POLYGLACTIN 910), SYNTHETIC ABSORBABLE SUTURE: Brand: COATED VICRYL

## (undated) DEVICE — 3M™ STERI-STRIP™ REINFORCED ADHESIVE SKIN CLOSURES, R1547, 1/2 IN X 4 IN (12 MM X 100 MM), 6 STRIPS/ENVELOPE: Brand: 3M™ STERI-STRIP™

## (undated) DEVICE — DIFFUSER: Brand: CORE, MAESTRO

## (undated) DEVICE — DRN JP FLT NO TROC SIL FUL/PERF 7MM

## (undated) DEVICE — MARKR SKIN W/RULR AND LBL

## (undated) DEVICE — OIL CARTRIDGE: Brand: CORE, MAESTRO

## (undated) DEVICE — DRP MICROSCP LEICA W/GLASS LENS

## (undated) DEVICE — GLV SURG BIOGEL LTX PF 7

## (undated) DEVICE — ENCORE® LATEX ORTHO SIZE 8, STERILE LATEX POWDER-FREE SURGICAL GLOVE: Brand: ENCORE

## (undated) DEVICE — DISPOSABLE BIPOLAR FORCEPS 7 3/4" (19.7CM) SCOVILLE BAYONET, 1.5MM TIP AND 12 FT. (3.6M) CABLE: Brand: KIRWAN

## (undated) DEVICE — TOWEL,OR,DSP,ST,BLUE,STD,4/PK,20PK/CS: Brand: MEDLINE

## (undated) DEVICE — 6.0MM PRECISION ROUND

## (undated) DEVICE — NDL SPINE 22G 31/2IN BLK